# Patient Record
Sex: MALE | Race: WHITE | NOT HISPANIC OR LATINO | URBAN - METROPOLITAN AREA
[De-identification: names, ages, dates, MRNs, and addresses within clinical notes are randomized per-mention and may not be internally consistent; named-entity substitution may affect disease eponyms.]

---

## 2017-12-13 ENCOUNTER — TELEPHONE (OUTPATIENT)
Dept: VASCULAR LAB | Facility: MEDICAL CENTER | Age: 62
End: 2017-12-13

## 2017-12-13 ENCOUNTER — ANTICOAGULATION VISIT (OUTPATIENT)
Dept: MEDICAL GROUP | Facility: PHYSICIAN GROUP | Age: 62
End: 2017-12-13

## 2017-12-13 DIAGNOSIS — I63.019 CEREBROVASCULAR ACCIDENT (CVA) DUE TO THROMBOSIS OF VERTEBRAL ARTERY, UNSPECIFIED BLOOD VESSEL LATERALITY (HCC): ICD-10-CM

## 2017-12-13 PROBLEM — I63.9 STROKE (HCC): Status: ACTIVE | Noted: 2017-12-13

## 2017-12-13 LAB — INR PPP: 2.7 (ref 2–3.5)

## 2017-12-13 PROCEDURE — 85610 PROTHROMBIN TIME: CPT | Performed by: NURSE PRACTITIONER

## 2017-12-13 PROCEDURE — 99211 OFF/OP EST MAY X REQ PHY/QHP: CPT | Performed by: NURSE PRACTITIONER

## 2017-12-13 RX ORDER — OMEPRAZOLE 20 MG/1
20 CAPSULE, DELAYED RELEASE ORAL DAILY
Status: ON HOLD | COMMUNITY
End: 2023-10-30 | Stop reason: SDUPTHER

## 2017-12-13 RX ORDER — ATORVASTATIN CALCIUM 10 MG/1
10 TABLET, FILM COATED ORAL NIGHTLY
COMMUNITY
End: 2021-04-12

## 2017-12-13 RX ORDER — ENALAPRIL MALEATE 5 MG/1
5 TABLET ORAL DAILY
Status: ON HOLD | COMMUNITY
End: 2023-10-30

## 2017-12-13 RX ORDER — WARFARIN SODIUM 5 MG/1
5 TABLET ORAL DAILY
COMMUNITY
End: 2018-06-07 | Stop reason: SDUPTHER

## 2017-12-13 NOTE — TELEPHONE ENCOUNTER
[facility]    No VM available, will try contacting again at a later time.  Referral requests pt to be seen in [place] location.    Saurav Walsh, MaryD

## 2017-12-13 NOTE — PROGRESS NOTES
Anticoagulation Summary  As of 12/13/2017    INR goal:   2.0-3.0   TTR:   --   Today's INR:   2.7   Maintenance plan:   2.5 mg (5 mg x 0.5) on Mon, Wed, Fri; 5 mg (5 mg x 1) all other days   Weekly total:   27.5 mg   Plan last modified:   Bernice Nielson PharmD (12/13/2017)   Next INR check:   1/10/2018   Target end date:       Indications    Stroke (CMS-HCC) [I63.9] [I63.9]             Anticoagulation Episode Summary     INR check location:   [facility]    Preferred lab:       Send INR reminders to:       Comments:         Anticoagulation Care Providers     Provider Role Specialty Phone number    [facility] Responsible  [phone]    Marshal Sethi M.D. Responsible Neurology [phone]        Anticoagulation Patient Findings    Pt is new to warfarin and new to [facility].  Discussed indication for warfarin therapy and INR goal range. Explained our services, hours of operation, warfarin therapy, potential SE, potential DI.. Discussed diet at length, with an emphasis on foods rich in vitamin K.  Discussed monitoring parameters, such as blood in urine, blood in stool, discussed what to do if a dose is missed, or suspected as missed.  Emphasized importance of compliance.  Discussed lifestyle choices of ETOH & smoking and its impact on therapy.     Pt denies any unusual s/s of bleeding, bruising, clotting or any changes to diet or medications.    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  Pt has been anticoagulated since 2015.  He was previously managed by the [facility].    Pt dose NOT take ASA 81 mg. Medications reviewed and reconciled.    Pt has been stable on this dose. Follow up in 4 weeks, to reduce risk of adverse events related to this high risk medication,  Warfarin.    Bernice Nielson, PharmD      Pt declines vitals today

## 2017-12-14 NOTE — TELEPHONE ENCOUNTER
Initial anticoagulation clinic note and referral from neurologist office reviewed.  Pending further recommendations, we will continue with indefinite anticoagulation as directed by patient's neurologist for presumed embolic stroke.  We'll defer all other follow-up, aside from anticoagulation management, to remainder of patient's care team, including neurology    Michael J. Bloch, MD  [facility]    CC: Dr. Sethi

## 2017-12-19 ENCOUNTER — TELEPHONE (OUTPATIENT)
Dept: VASCULAR LAB | Facility: MEDICAL CENTER | Age: 62
End: 2017-12-19

## 2018-01-10 ENCOUNTER — ANTICOAGULATION VISIT (OUTPATIENT)
Dept: MEDICAL GROUP | Facility: PHYSICIAN GROUP | Age: 63
End: 2018-01-10
Payer: COMMERCIAL

## 2018-01-10 DIAGNOSIS — I63.00 CEREBROVASCULAR ACCIDENT (CVA) DUE TO THROMBOSIS OF PRECEREBRAL ARTERY (HCC): ICD-10-CM

## 2018-01-10 LAB — INR PPP: 3 (ref 2–3.5)

## 2018-01-10 PROCEDURE — 85610 PROTHROMBIN TIME: CPT | Performed by: FAMILY MEDICINE

## 2018-01-10 PROCEDURE — 99211 OFF/OP EST MAY X REQ PHY/QHP: CPT | Performed by: FAMILY MEDICINE

## 2018-01-10 NOTE — PROGRESS NOTES
Anticoagulation Summary  As of 1/10/2018    INR goal:   2.0-3.0   TTR:   100.0 % (2.6 wk)   Today's INR:   3.0   Maintenance plan:   2.5 mg (5 mg x 0.5) on Mon, Wed, Fri; 5 mg (5 mg x 1) all other days   Weekly total:   27.5 mg   Plan last modified:   Mary GoncalvesD (12/13/2017)   Next INR check:      Target end date:   Indefinite    Indications    Stroke (CMS-HCC) [I63.9] [I63.9]             Anticoagulation Episode Summary     INR check location:   [facility]    Preferred lab:       Send INR reminders to:       Comments:         Anticoagulation Care Providers     Provider Role Specialty Phone number    [facility] Responsible  [phone]    Marshal Sethi M.D. Responsible Neurology [phone]        Anticoagulation Patient Findings  Patient Findings     Negatives:   Signs/symptoms of thrombosis, Signs/symptoms of bleeding, Laboratory test error suspected, Change in health, Change in alcohol use, Change in activity, Upcoming invasive procedure, Emergency department visit, Upcoming dental procedure, Missed doses, Extra doses, Change in medications, Change in diet/appetite, Hospital admission, Bruising, Other complaints        History of Present Illness: follow up appointment for chronic anticoagulation with the high risk medication, warfarin for stroke.  Pt remains therapeutic today.  Pt has been on a prednisone taper.  Pt will continue with 1/2 dose for two more days.  Follow up in 6 weeks, to reduce risk of adverse events related to this high risk medication,  Warfarin.    Bernice Nielson, PharmD     Pt declines vitals today

## 2018-02-14 ENCOUNTER — ANTICOAGULATION VISIT (OUTPATIENT)
Dept: MEDICAL GROUP | Facility: PHYSICIAN GROUP | Age: 63
End: 2018-02-14
Payer: COMMERCIAL

## 2018-02-14 VITALS
OXYGEN SATURATION: 95 % | SYSTOLIC BLOOD PRESSURE: 110 MMHG | HEIGHT: 65 IN | DIASTOLIC BLOOD PRESSURE: 58 MMHG | HEART RATE: 66 BPM

## 2018-02-14 DIAGNOSIS — I63.00 CEREBROVASCULAR ACCIDENT (CVA) DUE TO THROMBOSIS OF PRECEREBRAL ARTERY (HCC): ICD-10-CM

## 2018-02-14 LAB — INR PPP: 4.1 (ref 2–3.5)

## 2018-02-14 PROCEDURE — 85610 PROTHROMBIN TIME: CPT | Performed by: FAMILY MEDICINE

## 2018-02-14 PROCEDURE — 99211 OFF/OP EST MAY X REQ PHY/QHP: CPT | Performed by: FAMILY MEDICINE

## 2018-02-14 NOTE — PROGRESS NOTES
Anticoagulation Summary  As of 2/14/2018    INR goal:   2.0-3.0   TTR:   34.9 % (1.8 mo)   Today's INR:   4.1!   Maintenance plan:   5 mg (5 mg x 1) on Mon, Wed, Fri; 2.5 mg (5 mg x 0.5) all other days   Weekly total:   25 mg   Plan last modified:   Bernice Nielson PharmD (2/14/2018)   Next INR check:   2/28/2018   Target end date:   Indefinite    Indications    Stroke (CMS-HCC) [I63.9] [I63.9]             Anticoagulation Episode Summary     INR check location:   [facility]    Preferred lab:       Send INR reminders to:       Comments:         Anticoagulation Care Providers     Provider Role Specialty Phone number    [facility] Responsible  [phone]    Marshal Sethi M.D. Responsible Neurology [phone]        Anticoagulation Patient Findings  Patient Findings     Negatives:   Signs/symptoms of thrombosis, Signs/symptoms of bleeding, Laboratory test error suspected, Change in health, Change in alcohol use, Change in activity, Upcoming invasive procedure, Emergency department visit, Upcoming dental procedure, Missed doses, Extra doses, Change in medications, Change in diet/appetite, Hospital admission, Bruising, Other complaints        History of Present Illness: follow up appointment for chronic anticoagulation with the high risk medication, warfarin for [facility] [place].    Last INR was at goal. Pt decreased his carbohydrate intake for his diabetes, and is likely eating less in general.  Will HOLD dose tonight, and decrease weekly dose by 10%. Follow up in 2 weeks, to reduce risk of adverse events related to this high risk medication,  Warfarin.      Bernice Nielson, MaryD

## 2018-02-28 ENCOUNTER — ANTICOAGULATION VISIT (OUTPATIENT)
Dept: MEDICAL GROUP | Facility: PHYSICIAN GROUP | Age: 63
End: 2018-02-28
Payer: COMMERCIAL

## 2018-02-28 VITALS
DIASTOLIC BLOOD PRESSURE: 52 MMHG | OXYGEN SATURATION: 97 % | SYSTOLIC BLOOD PRESSURE: 98 MMHG | HEART RATE: 65 BPM | HEIGHT: 67 IN

## 2018-02-28 DIAGNOSIS — Z86.73 HISTORY OF STROKE: ICD-10-CM

## 2018-02-28 DIAGNOSIS — I63.00 CEREBROVASCULAR ACCIDENT (CVA) DUE TO THROMBOSIS OF PRECEREBRAL ARTERY (HCC): ICD-10-CM

## 2018-02-28 LAB — INR PPP: 2.3 (ref 2–3.5)

## 2018-02-28 PROCEDURE — 99211 OFF/OP EST MAY X REQ PHY/QHP: CPT | Performed by: FAMILY MEDICINE

## 2018-02-28 PROCEDURE — 85610 PROTHROMBIN TIME: CPT | Performed by: FAMILY MEDICINE

## 2018-02-28 NOTE — PROGRESS NOTES
Anticoagulation Summary  As of 2/28/2018    INR goal:   2.0-3.0   TTR:   35.7 % (2.2 mo)   Today's INR:   2.3   Maintenance plan:   5 mg (5 mg x 1) on Mon, Wed, Fri; 2.5 mg (5 mg x 0.5) all other days   Weekly total:   25 mg   Plan last modified:   Bernice Nielson PharmD (2/14/2018)   Next INR check:   3/28/2018   Target end date:   Indefinite    Indications    Stroke (CMS-HCC) [I63.9] [I63.9]             Anticoagulation Episode Summary     INR check location:   [facility]    Preferred lab:       Send INR reminders to:       Comments:         Anticoagulation Care Providers     Provider Role Specialty Phone number    [facility] Responsible  [phone]    Marshal Sethi M.D. Responsible Neurology [phone]        Anticoagulation Patient Findings    History of Present Illness: follow up appointment for chronic anticoagulation with the high risk medication, warfarin for stroke.    Last INR was out of range, dosage adjusted: dose was reduced.  Pt is now therapeutic today. Pt is to continue with current warfarin dosing regimen.  Pt denies any unusual s/s of bleeding, bruising, clotting or any changes to diet or medications.  Follow up in 4 weeks, to reduce risk of adverse events related to this high risk medication,  Warfarin.  Bernice Nielson, MaryD

## 2018-03-28 ENCOUNTER — ANTICOAGULATION VISIT (OUTPATIENT)
Dept: MEDICAL GROUP | Facility: PHYSICIAN GROUP | Age: 63
End: 2018-03-28
Payer: COMMERCIAL

## 2018-03-28 VITALS — DIASTOLIC BLOOD PRESSURE: 58 MMHG | HEART RATE: 57 BPM | SYSTOLIC BLOOD PRESSURE: 102 MMHG | OXYGEN SATURATION: 97 %

## 2018-03-28 DIAGNOSIS — I63.00 CEREBROVASCULAR ACCIDENT (CVA) DUE TO THROMBOSIS OF PRECEREBRAL ARTERY (HCC): ICD-10-CM

## 2018-03-28 LAB — INR PPP: 2.4 (ref 2–3.5)

## 2018-03-28 PROCEDURE — 99999 PR NO CHARGE: CPT | Performed by: NURSE PRACTITIONER

## 2018-03-28 PROCEDURE — 85610 PROTHROMBIN TIME: CPT | Performed by: NURSE PRACTITIONER

## 2018-03-28 NOTE — PROGRESS NOTES
Anticoagulation Summary  As of 3/28/2018    INR goal:   2.0-3.0   TTR:   54.6 % (3.2 mo)   Today's INR:   2.4   Maintenance plan:   5 mg (5 mg x 1) on Mon, Wed, Fri; 2.5 mg (5 mg x 0.5) all other days   Weekly total:   25 mg   Plan last modified:   Bernice Nielson PharmD (2/14/2018)   Next INR check:   5/9/2018   Target end date:   Indefinite    Indications    Stroke (CMS-HCC) [I63.9] [I63.9]             Anticoagulation Episode Summary     INR check location:   [facility]    Preferred lab:       Send INR reminders to:       Comments:         Anticoagulation Care Providers     Provider Role Specialty Phone number    [facility] Responsible  [phone]    Marshal Sethi M.D. Responsible Neurology [phone]        Anticoagulation Patient Findings    History of Present Illness: follow up appointment for chronic anticoagulation with the high risk medication, warfarin for stroke.    Pt remains therapeutic today. Pt is to continue with current warfarin dosing regimen.  Will increase the interval to recheck INR    Bernice Nielson PharmD

## 2018-05-09 ENCOUNTER — ANTICOAGULATION VISIT (OUTPATIENT)
Dept: MEDICAL GROUP | Facility: PHYSICIAN GROUP | Age: 63
End: 2018-05-09
Payer: COMMERCIAL

## 2018-05-09 VITALS — DIASTOLIC BLOOD PRESSURE: 55 MMHG | HEIGHT: 67 IN | SYSTOLIC BLOOD PRESSURE: 110 MMHG | HEART RATE: 60 BPM

## 2018-05-09 DIAGNOSIS — I63.00 CEREBROVASCULAR ACCIDENT (CVA) DUE TO THROMBOSIS OF PRECEREBRAL ARTERY (HCC): ICD-10-CM

## 2018-05-09 LAB — INR PPP: 2.2 (ref 2–3.5)

## 2018-05-09 PROCEDURE — 99211 OFF/OP EST MAY X REQ PHY/QHP: CPT | Performed by: FAMILY MEDICINE

## 2018-05-09 PROCEDURE — 85610 PROTHROMBIN TIME: CPT | Performed by: FAMILY MEDICINE

## 2018-05-09 NOTE — PROGRESS NOTES
Anticoagulation Summary  As of 5/9/2018    INR goal:   2.0-3.0   TTR:   68.5 % (4.6 mo)   Today's INR:   2.2   Warfarin maintenance plan:   5 mg (5 mg x 1) on Mon, Wed, Fri; 2.5 mg (5 mg x 0.5) all other days   Weekly warfarin total:   25 mg   Plan last modified:   Bernice Nielson PharmD (2/14/2018)   Next INR check:   7/11/2018   Target end date:   Indefinite    Indications    Stroke (CMS-HCC) [I63.9] [I63.9]             Anticoagulation Episode Summary     INR check location:   [facility]    Preferred lab:       Send INR reminders to:       Comments:         Anticoagulation Care Providers     Provider Role Specialty Phone number    [facility] Responsible  [phone]    Marshal Sethi M.D. Responsible Neurology [phone]        Anticoagulation Patient Findings    History of Present Illness: follow up appointment for chronic anticoagulation with the high risk medication, warfarin for stroke.    Pt remains therapeutic today. Pt is to continue with current warfarin dosing regimen.  Follow up in 8 weeks, to reduce risk of adverse events related to this high risk medication,  Warfarin.    Bernice Nielson, MaryD

## 2018-06-07 RX ORDER — WARFARIN SODIUM 5 MG/1
TABLET ORAL
Qty: 90 TAB | Refills: 1 | Status: SHIPPED | OUTPATIENT
Start: 2018-06-07 | End: 2018-12-10 | Stop reason: SDUPTHER

## 2018-07-11 ENCOUNTER — ANTICOAGULATION VISIT (OUTPATIENT)
Dept: MEDICAL GROUP | Facility: PHYSICIAN GROUP | Age: 63
End: 2018-07-11
Payer: COMMERCIAL

## 2018-07-11 DIAGNOSIS — Z86.73 HISTORY OF STROKE: ICD-10-CM

## 2018-07-11 LAB — INR PPP: 2.6 (ref 2–3.5)

## 2018-07-11 PROCEDURE — 85610 PROTHROMBIN TIME: CPT | Performed by: FAMILY MEDICINE

## 2018-07-11 PROCEDURE — 99999 PR NO CHARGE: CPT | Performed by: FAMILY MEDICINE

## 2018-07-11 NOTE — PROGRESS NOTES
Anticoagulation Summary  As of 7/11/2018    INR goal:   2.0-3.0   TTR:   78.4 % (6.7 mo)   Today's INR:   2.6   Warfarin maintenance plan:   5 mg (5 mg x 1) on Mon, Wed, Fri; 2.5 mg (5 mg x 0.5) all other days   Weekly warfarin total:   25 mg   Plan last modified:   Bernice Nielson PharmD (2/14/2018)   Next INR check:   9/19/2018   Target end date:   Indefinite    Indications    Stroke (CMS-HCC) [I63.9] [I63.9]             Anticoagulation Episode Summary     INR check location:   [facility]    Preferred lab:       Send INR reminders to:       Comments:         Anticoagulation Care Providers     Provider Role Specialty Phone number    [facility] Responsible  [phone]    Marshal Sethi M.D. Responsible Neurology [phone]        Anticoagulation Patient Findings    History of Present Illness: follow up appointment for chronic anticoagulation with the high risk medication, warfarin for stroke.    Pt remains therapeutic today. Pt is to continue with current warfarin dosing regimen.  Pt denies any unusual s/s of bleeding, bruising, clotting or any changes to diet or medications.  Follow up in 10 weeks, to reduce risk of adverse events related to this high risk medication,  Warfarin.    Bernice Nielson PharmD      Pt declines vitals today

## 2018-08-22 LAB — INR PPP: 2 (ref 2–3.5)

## 2018-08-23 ENCOUNTER — ANTICOAGULATION MONITORING (OUTPATIENT)
Dept: VASCULAR LAB | Facility: MEDICAL CENTER | Age: 63
End: 2018-08-23

## 2018-08-23 NOTE — PROGRESS NOTES
Anticoagulation Summary  As of 8/23/2018    INR goal:   2.0-3.0   TTR:   82.1 % (8.1 mo)   Today's INR:   2.0 (8/22/2018)   Warfarin maintenance plan:   5 mg (5 mg x 1) on Mon, Wed, Fri; 2.5 mg (5 mg x 0.5) all other days   Weekly warfarin total:   25 mg   Plan last modified:   Mary GoncalvesD (2/14/2018)   Next INR check:   9/19/2018   Target end date:   Indefinite    Indications    Stroke (CMS-HCC) [I63.9] [I63.9]             Anticoagulation Episode Summary     INR check location:   [facility]    Preferred lab:       Send INR reminders to:       Comments:         Anticoagulation Care Providers     Provider Role Specialty Phone number    [facility] Responsible  [phone]    Marshal Sethi M.D. Responsible Neurology [phone]        Anticoagulation Patient Findings    Spoke with Hieu to report a theraeputic today.  Pt is to continue with current warfarin dosing regimen.  Follow up in 4 weeks, to reduce risk of adverse events related to this high risk medication,  Warfarin.    Bernice Nielson, PharmD

## 2018-09-19 ENCOUNTER — ANTICOAGULATION VISIT (OUTPATIENT)
Dept: MEDICAL GROUP | Facility: PHYSICIAN GROUP | Age: 63
End: 2018-09-19
Payer: COMMERCIAL

## 2018-09-19 DIAGNOSIS — Z23 FLU VACCINE NEED: ICD-10-CM

## 2018-09-19 DIAGNOSIS — Z79.01 CURRENT USE OF LONG TERM ANTICOAGULATION: ICD-10-CM

## 2018-09-19 LAB — INR PPP: 2.3 (ref 2–3.5)

## 2018-09-19 PROCEDURE — 90471 IMMUNIZATION ADMIN: CPT | Performed by: FAMILY MEDICINE

## 2018-09-19 PROCEDURE — 85610 PROTHROMBIN TIME: CPT | Performed by: FAMILY MEDICINE

## 2018-09-19 PROCEDURE — 90686 IIV4 VACC NO PRSV 0.5 ML IM: CPT | Performed by: FAMILY MEDICINE

## 2018-09-19 PROCEDURE — 99999 PR NO CHARGE: CPT | Performed by: FAMILY MEDICINE

## 2018-09-19 NOTE — PROGRESS NOTES
Anticoagulation Summary  As of 9/19/2018    INR goal:   2.0-3.0   TTR:   84.0 % (9 mo)   Today's INR:   2.3   Warfarin maintenance plan:   5 mg (5 mg x 1) on Mon, Wed, Fri; 2.5 mg (5 mg x 0.5) all other days   Weekly warfarin total:   25 mg   Plan last modified:   Bernice Nielson, PharmD (2/14/2018)   Next INR check:   10/31/2018   Target end date:   Indefinite    Indications    Stroke (CMS-HCC) [I63.9] [I63.9]             Anticoagulation Episode Summary     INR check location:   [facility]    Preferred lab:       Send INR reminders to:       Comments:         Anticoagulation Care Providers     Provider Role Specialty Phone number    [facility] Responsible  [phone]    Marshal Sethi M.D. Responsible Neurology [phone]        Anticoagulation Patient Findings       History of Present Illness: follow up appointment for chronic anticoagulation with the high risk medication, warfarin for stroke    Pt remains therapeutic today. Continue current dosing regimen.  Follow up in 6 weeks, to reduce the risk of adverse events related to this high risk medication, warfarin.    Pt declines vitals today, however requested, consented to and received flu vaccine.  Bernice Nielson, Clinical Pharmacist

## 2018-10-27 LAB — INR PPP: 2.8 (ref 2–3.5)

## 2018-10-29 ENCOUNTER — TELEPHONE (OUTPATIENT)
Dept: VASCULAR LAB | Facility: MEDICAL CENTER | Age: 63
End: 2018-10-29

## 2018-10-29 NOTE — TELEPHONE ENCOUNTER
Pt called to report he is unable to go to appt 10[place]31-18 [place] He went to  10-, will call pt with results when we receive.     Nathalia Carroll, Pharm D

## 2018-10-31 ENCOUNTER — ANTICOAGULATION MONITORING (OUTPATIENT)
Dept: VASCULAR LAB | Facility: MEDICAL CENTER | Age: 63
End: 2018-10-31

## 2018-10-31 NOTE — PROGRESS NOTES
Anticoagulation Summary  As of 10/31/2018    INR goal:   2.0-3.0   TTR:   85.9 % (10.3 mo)   Today's INR:   2.8 (10/27/2018)   Warfarin maintenance plan:   5 mg (5 mg x 1) on Mon, Wed, Fri; 2.5 mg (5 mg x 0.5) all other days   Weekly warfarin total:   25 mg   Plan last modified:   Mary GoncalvesD (2/14/2018)   Next INR check:   12/26/2018   Target end date:   Indefinite    Indications    Stroke (CMS-HCC) [I63.9] [I63.9]             Anticoagulation Episode Summary     INR check location:   [facility]    Preferred lab:       Send INR reminders to:       Comments:         Anticoagulation Care Providers     Provider Role Specialty Phone number    [facility] Responsible  [phone]    Marshal Sethi M.D. Responsible Neurology [phone]        Anticoagulation Patient Findings    Left voicemail message to report a therapeutic INR of 2.8.  Pt to continue with current warfarin dosing regimen. Requested pt contact the clinic for any s/s of unusual bleeding, bruising, clotting or any changes to diet or medication. FU 8 weeks.  Dalton Taveras, PharmD

## 2018-11-28 ENCOUNTER — ANTICOAGULATION VISIT (OUTPATIENT)
Dept: MEDICAL GROUP | Facility: PHYSICIAN GROUP | Age: 63
End: 2018-11-28
Payer: COMMERCIAL

## 2018-11-28 DIAGNOSIS — Z86.73 HISTORY OF STROKE: ICD-10-CM

## 2018-11-28 LAB — INR PPP: 2.1 (ref 2–3.5)

## 2018-11-28 PROCEDURE — 85610 PROTHROMBIN TIME: CPT | Performed by: FAMILY MEDICINE

## 2018-11-28 PROCEDURE — 99999 PR NO CHARGE: CPT | Performed by: FAMILY MEDICINE

## 2018-11-28 NOTE — PROGRESS NOTES
Anticoagulation Summary  As of 11/28/2018    INR goal:   2.0-3.0   TTR:   87.3 % (11.3 mo)   Today's INR:   2.1   Warfarin maintenance plan:   5 mg (5 mg x 1) on Mon, Wed, Fri; 2.5 mg (5 mg x 0.5) all other days   Weekly warfarin total:   25 mg   Plan last modified:   Bernice Nielson, PharmD (2/14/2018)   Next INR check:   1/9/2019   Target end date:   Indefinite    Indications    Stroke (CMS-HCC) [I63.9] [I63.9]             Anticoagulation Episode Summary     INR check location:   [facility]    Preferred lab:       Send INR reminders to:       Comments:         Anticoagulation Care Providers     Provider Role Specialty Phone number    [facility] Responsible  [phone]    Marshal Sethi M.D. Responsible Neurology [phone]        Anticoagulation Patient Findings    History of Present Illness: follow up appointment for chronic anticoagulation with the high risk medication, warfarin for stroke    Pt remains therapeutic today. Continue current dosing regimen.  Follow up in 7 weeks, to reduce the risk of adverse events related to this high risk medication, warfarin.    Bernice Nielson Clinical Pharmacist    Pt declines vitals today

## 2018-12-10 RX ORDER — WARFARIN SODIUM 5 MG/1
TABLET ORAL
Qty: 90 TAB | Refills: 1 | Status: SHIPPED | OUTPATIENT
Start: 2018-12-10 | End: 2019-06-06 | Stop reason: SDUPTHER

## 2019-02-06 ENCOUNTER — ANTICOAGULATION VISIT (OUTPATIENT)
Dept: MEDICAL GROUP | Facility: PHYSICIAN GROUP | Age: 64
End: 2019-02-06
Payer: COMMERCIAL

## 2019-02-06 DIAGNOSIS — Z79.01 CURRENT USE OF LONG TERM ANTICOAGULATION: ICD-10-CM

## 2019-02-06 LAB — INR PPP: 2.8 (ref 2–3.5)

## 2019-02-06 PROCEDURE — 85610 PROTHROMBIN TIME: CPT | Performed by: FAMILY MEDICINE

## 2019-02-06 PROCEDURE — 93793 ANTICOAG MGMT PT WARFARIN: CPT | Performed by: FAMILY MEDICINE

## 2019-02-06 NOTE — PROGRESS NOTES
Anticoagulation Summary  As of 2019    INR goal:   2.0-3.0   TTR:   89.4 % (1.1 y)   INR used for dosin.8 (2019)   Warfarin maintenance plan:   5 mg (5 mg x 1) every Mon, Wed, Fri; 2.5 mg (5 mg x 0.5) all other days   Weekly warfarin total:   25 mg   Plan last modified:   Bernice Nielson, PharmD (2018)   Next INR check:   2019   Target end date:   Indefinite    Indications    Stroke (CMS-HCC) [I63.9] [I63.9]             Anticoagulation Episode Summary     INR check location:   [facility]    Preferred lab:       Send INR reminders to:       Comments:         Anticoagulation Care Providers     Provider Role Specialty Phone number    [facility] Responsible  [phone]    Marshal Sethi M.D. Responsible Neurology [phone]        Anticoagulation Patient Findings    History of Present Illness: follow up appointment for chronic anticoagulation with the high risk medication, warfarin for stroke    Pt remains therapeutic today. Continue current dosing regimen.  Follow up in 10 weeks, to reduce the risk of adverse events related to this high risk medication, warfarin.    Bernice Nielson Clinical Pharmacist      Pt declines vitals today

## 2019-03-20 ENCOUNTER — ANTICOAGULATION VISIT (OUTPATIENT)
Dept: MEDICAL GROUP | Facility: PHYSICIAN GROUP | Age: 64
End: 2019-03-20
Payer: COMMERCIAL

## 2019-03-20 DIAGNOSIS — Z79.01 CURRENT USE OF LONG TERM ANTICOAGULATION: ICD-10-CM

## 2019-03-20 LAB — INR PPP: 2.7 (ref 2–3.5)

## 2019-03-20 PROCEDURE — 85610 PROTHROMBIN TIME: CPT | Performed by: FAMILY MEDICINE

## 2019-03-20 PROCEDURE — 99211 OFF/OP EST MAY X REQ PHY/QHP: CPT | Performed by: FAMILY MEDICINE

## 2019-03-20 NOTE — PROGRESS NOTES
Anticoagulation Summary  As of 3/20/2019    INR goal:   2.0-3.0   TTR:   90.4 % (1.2 y)   INR used for dosin.7 (3/20/2019)   Warfarin maintenance plan:   5 mg (5 mg x 1) every Mon, Wed, Fri; 2.5 mg (5 mg x 0.5) all other days   Weekly warfarin total:   25 mg   Plan last modified:   Bernice Nielson, PharmD (2018)   Next INR check:      Target end date:   Indefinite    Indications    Stroke (CMS-HCC) [I63.9] [I63.9]             Anticoagulation Episode Summary     [facility]R check location:   [facility]    Preferred lab:       Send INR reminders to:       Comments:         Anticoagulation Care Providers     Provider Role Specialty Phone number    [facility] Responsible  [phone]    Marshal Sethi Responsible Neurology [phone]        Anticoagulation Patient Findings    History of Present Illness: follow up appointment for chronic anticoagulation with the high risk medication, warfarin for stroke    Pt remains therapeutic today. Pt is pending dental procedure that will require lovenox bridging, however it is not yet scheduled.       - Last dose of warfarin   - No warfarin, No enoxaparin   - No warfarin, begin enoxaparin 100 mg daily   - No warfarin, continue enoxaparin 100mg daily   - No warfarin, continue enoxaparin 100mg sub q daily   - No warfarin, Stop enoxaparin  - Procedure day - May resume warfarin AFTER procedure 5mg    -  Warfarin 5mg  and enoxaparin 100mg sub q daily   Continue warfarin 5mg  and enoxaparin 100mg sub q daily   Continue warfarin 5 mgand enoxaparin 100mg sub q daily   Continue warfarin 5 mg  and enoxaparin 100mg sub q daily    April 3 TEST INR     Bernice Nielson Clinical Pharmacist    Dosing instructions e-mailed to patient

## 2019-03-20 NOTE — PATIENT INSTRUCTIONS
Call Bernice [phone] when you have your dental appointment scheduled.    Days prior to procedure:  5 - No warfarin, No enoxaparin  4 - No warfarin, begin enoxaparin 100 mg daily  3 - No warfarin, continue enoxaparin 100mg daily  2 - No warfarin, continue enoxaparin 100mg sub q daily  1 - No warfarin, Stop enoxaparin    Day of procedure:   Operating physician to determine start date for enoxaparin and warfarin    Days after procedure:  1 - Begin warfarin and enoxaparin 100mg sub q daily  2 - Continue warfarin and enoxaparin 100mg sub q daily  3 - Continue warfarin and enoxaparin 100mg sub q daily  4 - Continue warfarin and enoxaparin 100mg sub q daily    5 - Continue warfarin and enoxaparin and have INR checked

## 2019-04-03 ENCOUNTER — ANTICOAGULATION VISIT (OUTPATIENT)
Dept: MEDICAL GROUP | Facility: PHYSICIAN GROUP | Age: 64
End: 2019-04-03
Payer: COMMERCIAL

## 2019-04-03 DIAGNOSIS — Z79.01 CHRONIC ANTICOAGULATION: ICD-10-CM

## 2019-04-03 LAB — INR PPP: 2.2 (ref 2–3.5)

## 2019-04-03 PROCEDURE — 85610 PROTHROMBIN TIME: CPT | Performed by: FAMILY MEDICINE

## 2019-04-03 PROCEDURE — 93793 ANTICOAG MGMT PT WARFARIN: CPT | Performed by: FAMILY MEDICINE

## 2019-04-03 NOTE — PROGRESS NOTES
Anticoagulation Summary  As of 4/3/2019    INR goal:   2.0-3.0   TTR:   90.7 % (1.3 y)   INR used for dosin.2 (4/3/2019)   Warfarin maintenance plan:   5 mg (5 mg x 1) every Mon, Wed, Fri; 2.5 mg (5 mg x 0.5) all other days   Weekly warfarin total:   25 mg   Plan last modified:   Bernice Nielson, PharmD (2018)   Next INR check:      Target end date:   Indefinite    Indications    Stroke (CMS-HCC) [I63.9] [I63.9]             Anticoagulation Episode Summary     [facility]R check location:   [facility]    Preferred lab:       Send INR reminders to:       Comments:         Anticoagulation Care Providers     Provider Role Specialty Phone number    [facility] Responsible  [phone]    Marshal Sethi Responsible Neurology [phone]        Anticoagulation Patient Findings    History of Present Illness: follow up appointment for chronic anticoagulation with the high risk medication, warfarin for stroke    Last INR was out of range, dosage adjusted: pt remains therapeutic, however, he was bridging for a  Dental procedure.  Pt is therapeutic today, so he can STOP lovenox injections. Continue current dosing regimen.  Follow up in 6 weeks, to reduce the risk of adverse events related to this high risk medication, warfarin.    Bernice Nielson, Clinical Pharmacist

## 2019-05-16 ENCOUNTER — TELEPHONE (OUTPATIENT)
Dept: VASCULAR LAB | Facility: MEDICAL CENTER | Age: 64
End: 2019-05-16

## 2019-05-16 NOTE — TELEPHONE ENCOUNTER
Pt no showed his anticoagulation services yesterday.  Will try to reschedule  Bernice Nielson, Clinical Pharmacist

## 2019-05-22 ENCOUNTER — ANTICOAGULATION VISIT (OUTPATIENT)
Dept: MEDICAL GROUP | Facility: PHYSICIAN GROUP | Age: 64
End: 2019-05-22
Payer: COMMERCIAL

## 2019-05-22 DIAGNOSIS — Z79.01 CHRONIC ANTICOAGULATION: ICD-10-CM

## 2019-05-22 LAB — INR PPP: 3.8 (ref 2–3.5)

## 2019-05-22 PROCEDURE — 85610 PROTHROMBIN TIME: CPT | Performed by: FAMILY MEDICINE

## 2019-05-22 PROCEDURE — 99211 OFF/OP EST MAY X REQ PHY/QHP: CPT | Performed by: FAMILY MEDICINE

## 2019-05-22 NOTE — PROGRESS NOTES
Anticoagulation Summary  As of 5/22/2019    INR goal:   2.0-3.0   TTR:   86.9 % (1.4 y)   INR used for dosing:   3.80! (5/22/2019)   Warfarin maintenance plan:   5 mg (5 mg x 1) every Mon, Wed, Fri; 2.5 mg (5 mg x 0.5) all other days   Weekly warfarin total:   25 mg   Plan last modified:   Bernice Nielson, PharmD (2/14/2018)   Next INR check:   6/5/2019   Target end date:   Indefinite    Indications    Stroke (CMS-HCC) [I63.9] [I63.9]             Anticoagulation Episode Summary     INR check location:   [facility]    Preferred lab:       Send INR reminders to:       Comments:         Anticoagulation Care Providers     Provider Role Specialty Phone number    [facility] Responsible  [phone]    Marshal Sethi Responsible Neurology [phone]        Anticoagulation Patient Findings          History of Present Illness: follow up appointment for chronic anticoagulation with the high risk medication, warfarin for stroke    Last INR was at goal, pt is now supra therapeutic today.  Pt has missed a couple of doses, then doubled up the next evening.  Discussed calling the clinic in the future if this happens again.  Discussed transitioning to Eliquis in the future, however stroke is not an approved indication for this medication.  Pt agrees to continue warfarin.  Pt will continue to follow up with Dr. Sethi, Neurologist.    Pt to HOLD warfarin tonight, then resume current dosing regimen. Follow up in 2 weeks, to reduce the risk of adverse events related to this high risk medication, warfarin.    Bernice Nielson, Clinical Pharmacist  Pt declines vitals today

## 2019-06-05 ENCOUNTER — ANTICOAGULATION VISIT (OUTPATIENT)
Dept: MEDICAL GROUP | Facility: PHYSICIAN GROUP | Age: 64
End: 2019-06-05
Payer: COMMERCIAL

## 2019-06-05 DIAGNOSIS — Z79.01 CHRONIC ANTICOAGULATION: ICD-10-CM

## 2019-06-05 LAB — INR PPP: 3.2 (ref 2–3.5)

## 2019-06-05 PROCEDURE — 85610 PROTHROMBIN TIME: CPT | Performed by: FAMILY MEDICINE

## 2019-06-05 PROCEDURE — 99211 OFF/OP EST MAY X REQ PHY/QHP: CPT | Performed by: FAMILY MEDICINE

## 2019-06-05 NOTE — PROGRESS NOTES
Anticoagulation Summary  As of 6/5/2019    INR goal:   2.0-3.0   TTR:   84.6 % (1.4 y)   INR used for dosing:   3.20! (6/5/2019)   Warfarin maintenance plan:   5 mg (5 mg x 1) every Mon, Fri; 2.5 mg (5 mg x 0.5) all other days   Weekly warfarin total:   22.5 mg   Plan last modified:   Bernice Nielson (6/5/2019)   Next INR check:   6/19/2019   Target end date:   Indefinite    Indications    Stroke (CMS-HCC) [I63.9] [I63.9]             Anticoagulation Episode Summary     [facility]R check location[facility]   [facility]    Preferred lab:       Send INR reminders to:       Comments:         Anticoagulation Care Providers     Provider Role Specialty Phone number    [facility] Responsible  [phone]    Marshal Sethi Responsible Neurology [phone]        Anticoagulation Patient Findings          History of Present Illness: follow up appointment for chronic anticoagulation with the high risk medication, warfarin for stroke    Last INR was out of range, dosage adjusted: pt remains supra therapeutic today trending downward.  Will reduce weekly dose by 10%.   Follow up in 2 weeks, to reduce the risk of adverse events related to this high risk medication, warfarin.    Bernice Nielson, Clinical Pharmacist    Pt wishes to establish with Dr. Molina.

## 2019-06-19 ENCOUNTER — ANTICOAGULATION VISIT (OUTPATIENT)
Dept: MEDICAL GROUP | Facility: PHYSICIAN GROUP | Age: 64
End: 2019-06-19
Payer: COMMERCIAL

## 2019-06-19 ENCOUNTER — OFFICE VISIT (OUTPATIENT)
Dept: MEDICAL GROUP | Facility: PHYSICIAN GROUP | Age: 64
End: 2019-06-19
Payer: COMMERCIAL

## 2019-06-19 VITALS
OXYGEN SATURATION: 98 % | HEIGHT: 68 IN | HEART RATE: 53 BPM | SYSTOLIC BLOOD PRESSURE: 102 MMHG | RESPIRATION RATE: 12 BRPM | TEMPERATURE: 97.7 F | DIASTOLIC BLOOD PRESSURE: 60 MMHG | WEIGHT: 155 LBS | BODY MASS INDEX: 23.49 KG/M2

## 2019-06-19 DIAGNOSIS — G47.09 OTHER INSOMNIA: ICD-10-CM

## 2019-06-19 DIAGNOSIS — Z87.19 HISTORY OF GI BLEED: ICD-10-CM

## 2019-06-19 DIAGNOSIS — Z79.01 CHRONIC ANTICOAGULATION: ICD-10-CM

## 2019-06-19 DIAGNOSIS — H54.7 VISION LOSS: ICD-10-CM

## 2019-06-19 DIAGNOSIS — Z86.73 HISTORY OF CVA (CEREBROVASCULAR ACCIDENT): ICD-10-CM

## 2019-06-19 PROBLEM — I63.9 STROKE (HCC): Status: RESOLVED | Noted: 2017-12-13 | Resolved: 2019-06-19

## 2019-06-19 LAB — INR PPP: 2.5 (ref 2–3.5)

## 2019-06-19 PROCEDURE — 85610 PROTHROMBIN TIME: CPT | Performed by: FAMILY MEDICINE

## 2019-06-19 PROCEDURE — 99397 PER PM REEVAL EST PAT 65+ YR: CPT | Performed by: FAMILY MEDICINE

## 2019-06-19 RX ORDER — INSULIN DEGLUDEC 100 U/ML
14 INJECTION, SOLUTION SUBCUTANEOUS DAILY
COMMUNITY

## 2019-06-19 ASSESSMENT — ENCOUNTER SYMPTOMS
GASTROINTESTINAL NEGATIVE: 1
PSYCHIATRIC NEGATIVE: 1
MYALGIAS: 0
HEARTBURN: 0
HEADACHES: 0
PALPITATIONS: 0
COUGH: 0
MUSCULOSKELETAL NEGATIVE: 1
CARDIOVASCULAR NEGATIVE: 1
FEVER: 0
TINGLING: 0
BLURRED VISION: 0
DIZZINESS: 0
EYES NEGATIVE: 1
NEUROLOGICAL NEGATIVE: 1
NAUSEA: 0
CHILLS: 0
BRUISES/BLEEDS EASILY: 0
HEMOPTYSIS: 0
DOUBLE VISION: 0
DEPRESSION: 0
CONSTITUTIONAL NEGATIVE: 1
RESPIRATORY NEGATIVE: 1

## 2019-06-19 ASSESSMENT — PATIENT HEALTH QUESTIONNAIRE - PHQ9: CLINICAL INTERPRETATION OF PHQ2 SCORE: 0

## 2019-06-19 NOTE — PROGRESS NOTES
Subjective:      Hieu Duong is a 64 y.o. male who presents with Annual Exam            New patient visit, seen by pharm services for IDDM and coumadin for history of cva and resultant visual field defect. No new issues   utd on HM except for shingles shot and placed on list    1. Vision loss  stable    2. IDDM (insulin dependent diabetes mellitus) (HCC)    Currently treated for DM, taking meds and checking bs at home, trying to do DM diet.controlled      3. History of CVA (cerebrovascular accident)  On statin and coumadin    4. History of GI bleed  No new issues for many years    Past Medical History:  No date: Diabetes (HCC)  No date: GI bleed      Comment:  after taking blood thinner  No date: Hyperlipidemia  No date: Hypertension  No date: Stroke (HCC)  No date: Vision loss      Comment:  peripheral right eye due to cva  No past surgical history on file.  Smoking status: Never Smoker                                                              Smokeless tobacco: Never Used                      Alcohol use: No              Review of patient's family history indicates:  Problem: Diabetes      Relation: Brother       Age of Onset: (Not Specified)       Current Outpatient Prescriptions: •  Insulin Degludec (TRESIBA) 100 UNIT/ML Solution, Inject 24 Units as instructed., Disp: , Rfl: •  warfarin (COUMADIN) 5 MG Tab, TAKE ONE-HALF TO ONE TABLET DAILY AS DIRECTED BY [facility], Disp: 90 Tab, Rfl: 1•  omeprazole (PRILOSEC) 20 MG delayed-release capsule, Take 20 mg by mouth every day., Disp: , Rfl: •  atorvastatin (LIPITOR) 10 MG Tab, Take 10 mg by mouth every evening., Disp: , Rfl: •  insulin lispro (HUMALOG) 100 UNIT/ML Solution, Inject  as instructed 3 times a day before meals., Disp: , Rfl: •  enalapril (VASOTEC) 5 MG Tab, Take 5 mg by mouth every day., Disp: , Rfl: •  Insulin Glargine (TOUJEO SOLOSTAR) 300 UNIT/ML Solution Pen-injector, Inject  as instructed., Disp: , Rfl:     Patient  "was instructed on the use of medications, either prescriptions or OTC and informed on when the appropriate follow up time period should be. In addition, patient was also instructed that should any acute worsening occur that they should notify this clinic asap or call 911.            Review of Systems   Constitutional: Negative.  Negative for chills and fever.   HENT: Negative.  Negative for hearing loss.    Eyes: Negative.  Negative for blurred vision and double vision.   Respiratory: Negative.  Negative for cough and hemoptysis.    Cardiovascular: Negative.  Negative for chest pain and palpitations.   Gastrointestinal: Negative.  Negative for heartburn and nausea.   Genitourinary: Negative.  Negative for dysuria.   Musculoskeletal: Negative.  Negative for myalgias.   Skin: Negative.  Negative for rash.   Neurological: Negative.  Negative for dizziness, tingling and headaches.   Endo/Heme/Allergies: Negative.  Does not bruise/bleed easily.   Psychiatric/Behavioral: Negative.  Negative for depression and suicidal ideas.   All other systems reviewed and are negative.         Objective:     /60   Pulse (!) 53   Temp 36.5 °C (97.7 °F)   Resp 12   Ht 1.715 m (5' 7.5\")   Wt 70.3 kg (155 lb)   SpO2 98%   BMI 23.92 kg/m²      Physical Exam   Constitutional: He is oriented to person, place, and time. He appears well-developed and well-nourished. No distress.   HENT:   Head: Normocephalic and atraumatic.   Mouth/Throat: Oropharynx is clear and moist. No oropharyngeal exudate.   Eyes: Pupils are equal, round, and reactive to light.   Cardiovascular: Normal rate, regular rhythm, normal heart sounds and intact distal pulses.  Exam reveals no gallop and no friction rub.    No murmur heard.  Pulmonary/Chest: Effort normal and breath sounds normal. No respiratory distress. He has no wheezes. He has no rales. He exhibits no tenderness.   Neurological: He is alert and oriented to person, place, and time.   Skin: He is " not diaphoretic.   Psychiatric: He has a normal mood and affect. His behavior is normal. Judgment and thought content normal.   Nursing note and vitals reviewed.              Assessment/Plan:     1. Vision loss      2. IDDM (insulin dependent diabetes mellitus) (HCC)      3. History of CVA (cerebrovascular accident)      4. History of GI bleed

## 2019-06-19 NOTE — PROGRESS NOTES
Anticoagulation Summary  As of 2019    INR goal:   2.0-3.0   TTR:   84.2 % (1.5 y)   INR used for dosin.50 (2019)   Warfarin maintenance plan:   5 mg (5 mg x 1) every Mon, Fri; 2.5 mg (5 mg x 0.5) all other days   Weekly warfarin total:   22.5 mg   Plan last modified:   Bernice Nielson (2019)   Next INR check:   2019   Target end date:   Indefinite    Indications    Stroke (CMS-HCC) [I63.9] (Resolved) [I63.9]             Anticoagulation Episode Summary     [facility] check location:   [facility]    Preferred lab:       Send INR reminders to:       Comments:         Anticoagulation Care Providers     Provider Role Specialty Phone number    [facility] Responsible  [phone]    Marshal Sethi Responsible Neurology [phone]        Anticoagulation Patient Findings  Patient Findings     Negatives:   Signs/symptoms of thrombosis, Signs/symptoms of bleeding, Laboratory test error suspected, Change in health, Change in alcohol use, Change in activity, Upcoming invasive procedure, Emergency department visit, Upcoming dental procedure, Missed doses, Extra doses, Change in medications, Change in diet/appetite, Hospital admission, Bruising, Other complaints              History of Present Illness: follow up appointment for chronic anticoagulation with the high risk medication, warfarin for atrial fibrillation.     Last INR was out of range, dosage adjusted: pt is now at goal. Continue current dosing regimen.  Follow up in 4 weeks, to reduce the risk of adverse events related to this high risk medication, warfarin.    Bernice Nielson, Clinical Pharmacist

## 2019-07-17 ENCOUNTER — ANTICOAGULATION VISIT (OUTPATIENT)
Dept: MEDICAL GROUP | Facility: PHYSICIAN GROUP | Age: 64
End: 2019-07-17
Payer: COMMERCIAL

## 2019-07-17 DIAGNOSIS — Z79.01 CHRONIC ANTICOAGULATION: ICD-10-CM

## 2019-07-17 LAB — INR PPP: 2.6 (ref 2–3.5)

## 2019-07-17 PROCEDURE — 93793 ANTICOAG MGMT PT WARFARIN: CPT | Performed by: FAMILY MEDICINE

## 2019-07-17 PROCEDURE — 85610 PROTHROMBIN TIME: CPT | Performed by: FAMILY MEDICINE

## 2019-07-17 NOTE — PROGRESS NOTES
Anticoagulation Summary  As of 2019    INR goal:   2.0-3.0   TTR:   85.0 % (1.6 y)   INR used for dosin.60 (2019)   Warfarin maintenance plan:   5 mg (5 mg x 1) every Mon, Fri; 2.5 mg (5 mg x 0.5) all other days   Weekly warfarin total:   22.5 mg   Plan last modified:   Bernice Nielson (2019)   Next INR check:   2019   Target end date:   Indefinite    Indications    Stroke (CMS-HCC) [I63.9] (Resolved) [I63.9]             Anticoagulation Episode Summary     INR check location:   [facility]    Preferred lab:       Send INR reminders to:       Comments:         Anticoagulation Care Providers     Provider Role Specialty Phone number    [facility] Responsible  [phone]    Marshal Sethi Responsible Neurology [phone]        Anticoagulation Patient Findings          History of Present Illness: follow up appointment for chronic anticoagulation with the high risk medication, warfarin for stroke    Pt remains therapeutic today. Continue current dosing regimen.  Follow up in 6 weeks, to reduce the risk of adverse events related to this high risk medication, warfarin.    Bernice Nielson Clinical Pharmacist      Pt declines vitals today

## 2019-08-28 ENCOUNTER — ANTICOAGULATION VISIT (OUTPATIENT)
Dept: MEDICAL GROUP | Facility: PHYSICIAN GROUP | Age: 64
End: 2019-08-28
Payer: COMMERCIAL

## 2019-08-28 DIAGNOSIS — Z79.01 CHRONIC ANTICOAGULATION: ICD-10-CM

## 2019-08-28 LAB — INR PPP: 1.9 (ref 2–3.5)

## 2019-08-28 PROCEDURE — 85610 PROTHROMBIN TIME: CPT | Performed by: FAMILY MEDICINE

## 2019-08-28 PROCEDURE — 99211 OFF/OP EST MAY X REQ PHY/QHP: CPT | Performed by: FAMILY MEDICINE

## 2019-08-28 NOTE — PROGRESS NOTES
Anticoagulation Summary  As of 2019    INR goal:   2.0-3.0   TTR:   85.1 % (1.7 y)   INR used for dosin.90! (2019)   Warfarin maintenance plan:   5 mg (5 mg x 1) every Mon, Fri; 2.5 mg (5 mg x 0.5) all other days   Weekly warfarin total:   22.5 mg   Plan last modified:   Bernice Nielson (2019)   Next INR check:   2019   Target end date:   Indefinite    Indications    Stroke (CMS-HCC) [I63.9] (Resolved) [I63.9]             Anticoagulation Episode Summary     INR check location:   [facility]    Preferred lab:       Send INR reminders to:       Comments:         Anticoagulation Care Providers     Provider Role Specialty Phone number    [facility] Responsible  [phone]    Marshal Sethi M.D. Responsible Neurology [phone]        Anticoagulation Patient Findings        History of Present Illness: follow up appointment for chronic anticoagulation with the high risk medication, warfarin for stroke    Last INR was at goal, pt is now sub therapeutic today.  He may have missed a dose this past week. Will bolus dose with 5mg tonight, then resume current dosing regimen. Follow up in 2 weeks, to reduce the risk of adverse events related to this high risk medication, warfarin.    Bernice Nielson, Clinical Pharmacist      Pt declines vitals today

## 2019-09-11 ENCOUNTER — ANTICOAGULATION VISIT (OUTPATIENT)
Dept: MEDICAL GROUP | Facility: PHYSICIAN GROUP | Age: 64
End: 2019-09-11
Payer: COMMERCIAL

## 2019-09-11 VITALS — DIASTOLIC BLOOD PRESSURE: 54 MMHG | OXYGEN SATURATION: 86 % | SYSTOLIC BLOOD PRESSURE: 108 MMHG | HEART RATE: 60 BPM

## 2019-09-11 DIAGNOSIS — Z79.01 CHRONIC ANTICOAGULATION: ICD-10-CM

## 2019-09-11 LAB — INR PPP: 2.4 (ref 2–3.5)

## 2019-09-11 PROCEDURE — 93793 ANTICOAG MGMT PT WARFARIN: CPT | Performed by: FAMILY MEDICINE

## 2019-09-11 PROCEDURE — 85610 PROTHROMBIN TIME: CPT | Performed by: FAMILY MEDICINE

## 2019-09-11 NOTE — PROGRESS NOTES
Anticoagulation Summary  As of 9/11/2019    INR goal:   2.0-3.0   TTR:   85.1 % (1.7 y)   INR used for dosing:      Warfarin maintenance plan:   5 mg (5 mg x 1) every Mon, Fri; 2.5 mg (5 mg x 0.5) all other days   Weekly warfarin total:   22.5 mg   Plan last modified:   Bernice Nielson (6/5/2019)   Next INR check:      Target end date:   Indefinite    Indications    Stroke (CMS-HCC) [I63.9] (Resolved) [I63.9]             Anticoagulation Episode Summary     INR check location:   [facility]    Preferred lab:       Send INR reminders to:       Comments:         Anticoagulation Care Providers     Provider Role Specialty Phone number    [facility] Responsible  [phone]    Marshal Sethi M.D. Responsible Neurology [phone]        Anticoagulation Patient Findings  Patient Findings     Negatives:   Signs/symptoms of thrombosis, Signs/symptoms of bleeding, Laboratory test error suspected, Change in health, Change in alcohol use, Change in activity, Upcoming invasive procedure, Emergency department visit, Upcoming dental procedure, Missed doses, Extra doses, Change in medications, Change in diet/appetite, Hospital admission, Bruising, Other complaints        History of Present Illness: follow up appointment for chronic anticoagulation with the high risk medication, warfarin for stroke.    Last INR was out of range, dosage adjusted: Pt is therapeutic.    Continue current dosing regimen.    Follow up in 2 weeks, to reduce the risk of adverse events related to this high risk medication, warfarin.        I have reviewed and concur with the above plan on 09/11/2019.  Peter Jara, Pharmacy Intern  Bernice Nielson, Clinical Pharmacist

## 2019-09-25 ENCOUNTER — ANTICOAGULATION VISIT (OUTPATIENT)
Dept: MEDICAL GROUP | Facility: PHYSICIAN GROUP | Age: 64
End: 2019-09-25
Payer: COMMERCIAL

## 2019-09-25 DIAGNOSIS — Z79.01 CHRONIC ANTICOAGULATION: ICD-10-CM

## 2019-09-25 LAB — INR PPP: 1.9 (ref 2–3.5)

## 2019-09-25 PROCEDURE — 99211 OFF/OP EST MAY X REQ PHY/QHP: CPT | Performed by: FAMILY MEDICINE

## 2019-09-25 PROCEDURE — 85610 PROTHROMBIN TIME: CPT | Performed by: FAMILY MEDICINE

## 2019-09-25 NOTE — PATIENT INSTRUCTIONS
October 5 - Last dose of warfarin  October 6- No warfarin,  Lovenox 100mg sub q at dinner time  October 7- No warfarin,  Lovenox 100mg sub q at dinner time  October 8- No warfarin,  Lovenox 100mg sub q at dinner time  October 9- No warfarin,  Lovenox 100mg sub q at dinner time  October 10 NO BLOOD THINNER  October 11- Procedure day, may start warfarin AFTER procedure  October 12- Warfarin 5mg AND Lovonox 100mg sub q at dinner time  October 13 - Warfarin 5mg AND Lovonox 100mg sub q at dinner time  October 14 - Warfarin 5mg AND Lovonox 100mg sub q at dinner time  October 15 - Warfarin 5mg AND Lovonox 100mg sub q at dinner time  October 16 TEST INR

## 2019-09-25 NOTE — PROGRESS NOTES
Anticoagulation Summary  As of 2019    INR goal:   2.0-3.0   TTR:   84.8 % (1.8 y)   INR used for dosin.90! (2019)   Warfarin maintenance plan:   5 mg (5 mg x 1) every Mon, Fri; 2.5 mg (5 mg x 0.5) all other days   Weekly warfarin total:   22.5 mg   Plan last modified:   Bernice M Filter (2019)   Next INR check:      Target end date:   Indefinite    Indications    Stroke (CMS-HCC) [I63.9] (Resolved) [I63.9]             Anticoagulation Episode Summary     INR check location:   [facility]    Preferred lab:       Send INR reminders to:       Comments:         Anticoagulation Care Providers     Provider Role Specialty Phone number    [facility] Responsible  [phone]    Marshal Sethi M.D. Responsible Neurology [phone]        Anticoagulation Patient Findings          History of Present Illness: follow up appointment for chronic anticoagulation with the high risk medication, warfarin for stroke    Last INR was at goal, pt is now sub therapeutic today.  Pt will be having a dental procedure (post placement) that will require lovenox bridging.     - Last dose of warfarin  - No warfarin,  Lovenox 100mg sub q at dinner time  - No warfarin,  Lovenox 100mg sub q at dinner time  - No warfarin,  Lovenox 100mg sub q at dinner time  - No warfarin,  Lovenox 100mg sub q at dinner time  October 10 NO BLOOD THINNER  - Procedure day, may start warfarin AFTER procedure  - Warfarin 5mg AND Lovonox 100mg sub q at dinner time   - Warfarin 5mg AND Lovonox 100mg sub q at dinner time   - Warfarin 5mg AND Lovonox 100mg sub q at dinner time  October 15 - Warfarin 5mg AND Lovonox 100mg sub q at dinner time   TEST INR

## 2019-10-16 ENCOUNTER — ANTICOAGULATION VISIT (OUTPATIENT)
Dept: MEDICAL GROUP | Facility: PHYSICIAN GROUP | Age: 64
End: 2019-10-16
Payer: COMMERCIAL

## 2019-10-16 DIAGNOSIS — Z79.01 CHRONIC ANTICOAGULATION: ICD-10-CM

## 2019-10-16 LAB — INR PPP: 1.9 (ref 2–3.5)

## 2019-10-16 PROCEDURE — 85610 PROTHROMBIN TIME: CPT | Performed by: FAMILY MEDICINE

## 2019-10-16 PROCEDURE — 99211 OFF/OP EST MAY X REQ PHY/QHP: CPT | Performed by: FAMILY MEDICINE

## 2019-10-16 NOTE — PROGRESS NOTES
Anticoagulation Summary  As of 10/16/2019    INR goal:   2.0-3.0   TTR:   82.1 % (1.8 y)   INR used for dosin.90! (10/16/2019)   Warfarin maintenance plan:   5 mg (5 mg x 1) every Mon, Fri; 2.5 mg (5 mg x 0.5) all other days   Weekly warfarin total:   22.5 mg   Plan last modified:   Bernice HORNE Filter (2019)   Next INR check:   10/23/2019   Target end date:   Indefinite    Indications    Stroke (CMS-HCC) [I63.9] (Resolved) [I63.9]             Anticoagulation Episode Summary     INR check location:   [facility]    Preferred lab:       Send INR reminders to:       Comments:         Anticoagulation Care Providers     Provider Role Specialty Phone number    [facility] Responsible  [phone]    Marshal Sethi M.D. Responsible Neurology [phone]        Anticoagulation Patient Findings  Patient Findings     Negatives:   Signs/symptoms of thrombosis, Signs/symptoms of bleeding, Laboratory test error suspected, Change in health, Change in alcohol use, Change in activity, Upcoming invasive procedure, Emergency department visit, Upcoming dental procedure, Missed doses, Extra doses, Change in medications, Change in diet/appetite, Hospital admission, Bruising, Other complaints            HPI:   Pt seen in clinic today, on anticoagulation therapy with warfarin for history of stroke    Patient's previous INR was slightly subtherapeutic at 1.9 on 19, at which time patient was instructed to continue current regimen, then start bridging for dental procedure.  He returns to clinic today to recheck INR to ensure it is therapeutic and thus preventing possible clotting and/or bleeding/bruising complications.    Does patient have any changes to current medical/health status since last appt (Y/N):  n  Does patient have any signs/symptoms of bleeding and/or thrombosis since the last appt (Y/N):  n  Does patient have any interval changes to diet or medications since last appt (Y/N):  n  Are  there any complications or cost restrictions with current therapy (Y/N):  n       Vitals declined today    Asssessment:      INR slightly subtherapeutic at 1.9   Reason(s) for out of range INR today:  Doses held for procedure      Plan:  Instructed pt to bolus x 1 day then continue with weekly regimen; anticipate INR to continue to increase past 2.0; therefore pt can stop Lovenox     Follow up:  Because warfarin is a high risk medication and current CHEST guidelines recommend regular monitoring intervals (few days up to 12 weeks), will have patient return to clinic in 1 weeks to recheck INR.    Pt's [facility] PCP is Doug Molina M.D.    Amy Can, MaryD

## 2019-10-23 ENCOUNTER — ANTICOAGULATION VISIT (OUTPATIENT)
Dept: MEDICAL GROUP | Facility: PHYSICIAN GROUP | Age: 64
End: 2019-10-23
Payer: COMMERCIAL

## 2019-10-23 DIAGNOSIS — Z79.01 CHRONIC ANTICOAGULATION: ICD-10-CM

## 2019-10-23 LAB — INR PPP: 2.3 (ref 2–3.5)

## 2019-10-23 PROCEDURE — 93793 ANTICOAG MGMT PT WARFARIN: CPT | Performed by: FAMILY MEDICINE

## 2019-10-23 PROCEDURE — 85610 PROTHROMBIN TIME: CPT | Performed by: FAMILY MEDICINE

## 2019-10-23 NOTE — PROGRESS NOTES
Anticoagulation Summary  As of 10/23/2019    INR goal:   2.0-3.0   TTR:   82.1 % (1.8 y)   INR used for dosin.30 (10/23/2019)   Warfarin maintenance plan:   5 mg (5 mg x 1) every Mon, Fri; 2.5 mg (5 mg x 0.5) all other days   Weekly warfarin total:   22.5 mg   Plan last modified:   Bernice HORNE Filter (2019)   Next INR check:   2019   Target end date:   Indefinite    Indications    Stroke (CMS-HCC) [I63.9] (Resolved) [I63.9]             Anticoagulation Episode Summary     INR check location:   [facility]    Preferred lab:       Send INR reminders to:       Comments:         Anticoagulation Care Providers     Provider Role Specialty Phone number    [facility] Responsible  [phone]    Marshal Sethi M.D. Responsible Neurology [phone]        Anticoagulation Patient Findings  Patient Findings     Positives:   Change in health (back pain), Change in medications (took ibuprofen 200 mg on 3 separate occassions the past week)    Negatives:   Signs/symptoms of thrombosis, Signs/symptoms of bleeding, Laboratory test error suspected, Change in alcohol use, Change in activity, Upcoming invasive procedure, Emergency department visit, Upcoming dental procedure, Missed doses, Extra doses, Change in diet/appetite, Hospital admission, Bruising, Other complaints            HPI:   Pt seen in clinic today, on anticoagulation therapy with warfarin for history of stroke    Patient's previous INR was subtherapeutic at 1.9 on 10/16, at which time patient was instructed to bolus, then continue regimen.  He returns to clinic today to recheck INR to ensure it is therapeutic and thus preventing possible clotting and/or bleeding/bruising complications.      Does patient have any changes to current medical/health status since last appt (Y/N):  Yes see above  Does patient have any signs/symptoms of bleeding and/or thrombosis since the last appt (Y/N):  n  Does patient have any interval changes  to diet or medications since last appt (Y/N):  Yes see above  Are there any complications or cost restrictions with current therapy (Y/N):  n       Vitals declined    Asssessment:      INR therapeutic at 2.3    Plan:  Pt is to continue with current warfarin dosing regimen.  Pt to have back pain evaluated and monitor for s/s of bleeding while taking NSAID     Follow up:  Because warfarin is a high risk medication and current CHEST guidelines recommend regular monitoring intervals (few days up to 12 weeks), will have patient return to clinic in 2 weeks to recheck INR.    Amy Can, MaryD

## 2019-11-06 ENCOUNTER — HOSPITAL ENCOUNTER (OUTPATIENT)
Facility: MEDICAL CENTER | Age: 64
End: 2019-11-06
Attending: PHYSICIAN ASSISTANT
Payer: COMMERCIAL

## 2019-11-06 ENCOUNTER — OFFICE VISIT (OUTPATIENT)
Dept: URGENT CARE | Facility: CLINIC | Age: 64
End: 2019-11-06
Payer: COMMERCIAL

## 2019-11-06 ENCOUNTER — ANTICOAGULATION VISIT (OUTPATIENT)
Dept: MEDICAL GROUP | Facility: PHYSICIAN GROUP | Age: 64
End: 2019-11-06
Payer: COMMERCIAL

## 2019-11-06 VITALS
RESPIRATION RATE: 14 BRPM | BODY MASS INDEX: 22.58 KG/M2 | WEIGHT: 149 LBS | SYSTOLIC BLOOD PRESSURE: 106 MMHG | HEART RATE: 65 BPM | TEMPERATURE: 98.1 F | DIASTOLIC BLOOD PRESSURE: 64 MMHG | HEIGHT: 68 IN | OXYGEN SATURATION: 96 %

## 2019-11-06 DIAGNOSIS — R30.0 DYSURIA: ICD-10-CM

## 2019-11-06 DIAGNOSIS — Z79.01 CHRONIC ANTICOAGULATION: ICD-10-CM

## 2019-11-06 LAB
APPEARANCE UR: CLEAR
BILIRUB UR STRIP-MCNC: NEGATIVE MG/DL
COLOR UR AUTO: YELLOW
GLUCOSE UR STRIP.AUTO-MCNC: NEGATIVE MG/DL
INR PPP: 2.1 (ref 2–3.5)
KETONES UR STRIP.AUTO-MCNC: NEGATIVE MG/DL
LEUKOCYTE ESTERASE UR QL STRIP.AUTO: NEGATIVE
NITRITE UR QL STRIP.AUTO: NEGATIVE
PH UR STRIP.AUTO: 7 [PH] (ref 5–8)
PROT UR QL STRIP: NEGATIVE MG/DL
RBC UR QL AUTO: NEGATIVE
SP GR UR STRIP.AUTO: 1.01
UROBILINOGEN UR STRIP-MCNC: NEGATIVE MG/DL

## 2019-11-06 PROCEDURE — 99213 OFFICE O/P EST LOW 20 MIN: CPT | Performed by: PHYSICIAN ASSISTANT

## 2019-11-06 PROCEDURE — 99999 POCT PROTIME: CPT | Performed by: FAMILY MEDICINE

## 2019-11-06 PROCEDURE — 87086 URINE CULTURE/COLONY COUNT: CPT

## 2019-11-06 PROCEDURE — 81002 URINALYSIS NONAUTO W/O SCOPE: CPT | Performed by: PHYSICIAN ASSISTANT

## 2019-11-06 ASSESSMENT — ENCOUNTER SYMPTOMS
DIZZINESS: 0
VOMITING: 0
FEVER: 0
PALPITATIONS: 0
BLURRED VISION: 0
FLANK PAIN: 0
CHILLS: 0
SHORTNESS OF BREATH: 0
ABDOMINAL PAIN: 0
NAUSEA: 0
BACK PAIN: 0

## 2019-11-06 NOTE — PROGRESS NOTES
Anticoagulation Summary  As of 2019    INR goal:   2.0-3.0   TTR:   82.4 % (1.9 y)   INR used for dosin.10 (2019)   Warfarin maintenance plan:   5 mg (5 mg x 1) every Mon, Fri; 2.5 mg (5 mg x 0.5) all other days   Weekly warfarin total:   22.5 mg   Plan last modified:   Bernice Nielson (2019)   Next INR check:   2019   Target end date:   Indefinite    Indications    Stroke (CMS-HCC) [I63.9] (Resolved) [I63.9]             Anticoagulation Episode Summary     INR check location:   [facility]ticoagulation Clinic    Preferred lab:       Send INR reminders to:       Comments:         Anticoagulation Care Providers     Provider Role Specialty Phone number    [facility] Responsible  [phone]    Marshal Sethi M.D. Responsible Neurology [phone]        Anticoagulation Patient Findings          History of Present Illness: follow up appointment for chronic anticoagulation with the high risk medication, warfarin for stroke.    Pt c/o s/s of UTI.  Referred pt to PCP or UC for evaluation today.  Pt agrees to go to UC after neurology appointment with Dr. Sethi today.  Pt will call me with name of antibiotic, if one is prescribed.    Pt remains therapeutic today. Continue current dosing regimen.  Follow up in 4 weeks, to reduce the risk of adverse events related to this high risk medication, warfarin.    Pt declines vitals today    Bernice Nielson, Clinical Pharmacist

## 2019-11-06 NOTE — PROGRESS NOTES
Subjective:      Hieu Duong is a 64 y.o. male who presents with Painful Urination      Dysuria    This is a new problem. The current episode started in the past 7 days (SStarted 2-3 days ago). The problem occurs every urination. The problem has been gradually improving. The quality of the pain is described as burning. The pain is mild. There has been no fever. He is not sexually active. There is no history of pyelonephritis. Associated symptoms include hesitancy. Pertinent negatives include no chills, discharge, flank pain, frequency, hematuria, nausea, urgency or vomiting. He has tried nothing for the symptoms.       Review of Systems   Constitutional: Negative for chills, fever and malaise/fatigue.   Eyes: Negative for blurred vision.   Respiratory: Negative for shortness of breath.    Cardiovascular: Negative for chest pain and palpitations.   Gastrointestinal: Negative for abdominal pain, nausea and vomiting.   Genitourinary: Positive for dysuria and hesitancy. Negative for flank pain, frequency, hematuria and urgency.   Musculoskeletal: Negative for back pain.   Neurological: Negative for dizziness.       PMH:  has a past medical history of Diabetes ([facility]), GI bleed, Hyperlipidemia, Hypertension, Stroke ([facility]), and Vision loss.  MEDS:   Current Outpatient Medications:   •  Insulin Degludec (TRESIBA) 100 UNIT/ML Solution, Inject 24 Units as instructed., Disp: , Rfl:   •  warfarin (COUMADIN) 5 MG Tab, TAKE ONE-HALF TO ONE TABLET DAILY AS DIRECTED BY [facility], Disp: 90 Tab, Rfl: 1  •  omeprazole (PRILOSEC) 20 MG delayed-release capsule, Take 20 mg by mouth every day., Disp: , Rfl:   •  atorvastatin (LIPITOR) 10 MG Tab, Take 10 mg by mouth every evening., Disp: , Rfl:   •  insulin lispro (HUMALOG) 100 UNIT/ML Solution, Inject  as instructed 3 times a day before meals., Disp: , Rfl:   •  enalapril (VASOTEC) 5 MG Tab, Take 5 mg by mouth every day., Disp: , Rfl:   •  enoxaparin (LOVENOX)  "100 MG/ML Solution inj, Inject 100 mg as instructed every day. (Patient not taking: Reported on 11/6/2019), Disp: 5 Syringe, Rfl: 1  •  Insulin Glargine (TOUJEO SOLOSTAR) 300 UNIT/ML Solution Pen-injector, Inject  as instructed., Disp: , Rfl:   ALLERGIES:   Allergies   Allergen Reactions   • Tdap [Dipth, Acell Pertus, Tetanus]      SURGHX: History reviewed. No pertinent surgical history.  SOCHX:  reports that he has never smoked. He has never used smokeless tobacco. He reports that he does not drink alcohol or use drugs.  FH: Family history was reviewed, no pertinent findings to report     Objective:     /64 (BP Location: Left arm, Patient Position: Sitting)   Pulse 65   Temp 36.7 °C (98.1 °F)   Resp 14   Ht 1.715 m (5' 7.5\")   Wt 67.6 kg (149 lb)   SpO2 96%   BMI 22.99 kg/m²      Physical Exam  Constitutional:       Appearance: Normal appearance. He is well-developed.   HENT:      Head: Normocephalic and atraumatic.      Right Ear: External ear normal.      Left Ear: External ear normal.   Eyes:      Conjunctiva/sclera: Conjunctivae normal.      Pupils: Pupils are equal, round, and reactive to light.   Cardiovascular:      Rate and Rhythm: Normal rate and regular rhythm.      Heart sounds: No murmur.   Pulmonary:      Effort: Pulmonary effort is normal.      Breath sounds: Normal breath sounds.   Abdominal:      Palpations: Abdomen is soft.      Tenderness: There is no tenderness.   Skin:     General: Skin is warm and dry.      Capillary Refill: Capillary refill takes less than 2 seconds.   Neurological:      Mental Status: He is alert and oriented to person, place, and time.   Psychiatric:         Behavior: Behavior normal.         Judgment: Judgment normal.       POCT Urinalysis - WNL     Assessment/Plan:     1. Dysuria  - POCT Urinalysis  - URINE CULTURE(NEW); Future  *POCT urinalysis is negative for infection and patient does report that his symptoms have been gradually improving.  We will send the " urine off for culture just to definitively make sure there is no infection.  If urine culture is negative and patient's symptoms persist he will need to follow-up with his PCP for further evaluation.          Differential Diagnosis, natural history, and supportive care discussed. Return to the Urgent Care or follow up with your PCP if symptoms fail to resolve, or for any new or worsening symptoms. Emergency room precautions discussed. Patient and/or family appears understanding of information.

## 2019-11-07 DIAGNOSIS — R30.0 DYSURIA: ICD-10-CM

## 2019-11-09 LAB
BACTERIA UR CULT: NORMAL
SIGNIFICANT IND 70042: NORMAL
SITE SITE: NORMAL
SOURCE SOURCE: NORMAL

## 2019-12-04 ENCOUNTER — ANTICOAGULATION VISIT (OUTPATIENT)
Dept: MEDICAL GROUP | Facility: PHYSICIAN GROUP | Age: 64
End: 2019-12-04
Payer: COMMERCIAL

## 2019-12-04 DIAGNOSIS — Z79.01 CHRONIC ANTICOAGULATION: ICD-10-CM

## 2019-12-04 LAB — INR PPP: 2.9 (ref 2–3.5)

## 2019-12-04 PROCEDURE — 93793 ANTICOAG MGMT PT WARFARIN: CPT | Performed by: FAMILY MEDICINE

## 2019-12-04 PROCEDURE — 85610 PROTHROMBIN TIME: CPT | Performed by: FAMILY MEDICINE

## 2019-12-04 NOTE — PROGRESS NOTES
Anticoagulation Summary  As of 2019    INR goal:   2.0-3.0   TTR:   83.1 % (1.9 y)   INR used for dosin.90 (2019)   Warfarin maintenance plan:   5 mg (5 mg x 1) every Mon, Fri; 2.5 mg (5 mg x 0.5) all other days   Weekly warfarin total:   22.5 mg   Plan last modified:   Bernice Nielson (2019)   Next INR check:   2020   Target end date:   Indefinite    Indications    Stroke (CMS-HCC) [I63.9] (Resolved) [I63.9]             Anticoagulation Episode Summary     INR check location:   Anticoagulation Clinic    Preferred lab:       Send INR reminders to:       Comments:         Anticoagulation Care Providers     Provider Role Specialty Phone number    [facility] Responsible  [phone]    Marshal Stehi M.D. Responsible Neurology [phone]        Anticoagulation Patient Findings          History of Present Illness: follow up appointment for chronic anticoagulation with the high risk medication, warfarin for stroke.    Last INR was in range, no dosage adjustment needed.    INR is therapeutic  Pt denies any unusual s/s of bleeding, bruising, clotting or any changes to diet or medications.     Pt is to continue with current warfarin dosing regimen.       Follow up in 5 weeks, to reduce the risk of adverse events related to this high risk medication, warfarin.    Flor Jenkins, Pharmacy Intern    I have reviewed and concur with the above plan on 2019.    Bernice Nielson, Clinical Pharmacist

## 2020-01-08 ENCOUNTER — ANTICOAGULATION VISIT (OUTPATIENT)
Dept: MEDICAL GROUP | Facility: PHYSICIAN GROUP | Age: 65
End: 2020-01-08
Payer: COMMERCIAL

## 2020-01-08 DIAGNOSIS — Z79.01 CHRONIC ANTICOAGULATION: ICD-10-CM

## 2020-01-08 LAB — INR PPP: 2.2 (ref 2–3.5)

## 2020-01-08 PROCEDURE — 85610 PROTHROMBIN TIME: CPT | Performed by: FAMILY MEDICINE

## 2020-01-08 PROCEDURE — 93793 ANTICOAG MGMT PT WARFARIN: CPT | Performed by: FAMILY MEDICINE

## 2020-01-08 NOTE — PROGRESS NOTES
Anticoagulation Summary  As of 2020    INR goal:   2.0-3.0   TTR:   83.9 % (2 y)   INR used for dosin.20 (2020)   Warfarin maintenance plan:   5 mg (5 mg x 1) every Mon, Fri; 2.5 mg (5 mg x 0.5) all other days   Weekly warfarin total:   22.5 mg   Plan last modified:   Bernice HORNE Filter (2019)   Next INR check:   2020   Target end date:   Indefinite    Indications    Stroke (CMS-HCC) [I63.9] (Resolved) [I63.9]             Anticoagulation Episode Summary     INR check location:   Anticoagulation Clinic    Preferred lab:       Send INR reminders to:       Comments:         Anticoagulation Care Providers     Provider Role Specialty Phone number    [facility] Responsible  [phone]    Marshal Sethi M.D. Responsible Neurology [phone]                Anticoagulation Patient Findings  Patient Findings     Negatives:   Signs/symptoms of thrombosis, Signs/symptoms of bleeding, Laboratory test error suspected, Change in health, Change in alcohol use, Change in activity, Upcoming invasive procedure, Emergency department visit, Upcoming dental procedure, Missed doses, Extra doses, Change in medications, Change in diet/appetite, Hospital admission, Bruising, Other complaints          HPI:  Hieu Duong seen in clinic today, on anticoagulation therapy with warfarin for stroke  Changes to current medical/health status since last appt: none  Denies signs/symptoms of bleeding and/or thrombosis since the last appt.    Denies any interval changes to diet  Denies any interval changes to medications since last appt.   Denies any complications or cost restrictions with current therapy.   BP check declined      A/P   INR  therapeutic.   Pt is to continue with current warfarin dosing regimen.    Follow up appointment in 6 week(s).    Amy aCn, PharmD

## 2020-02-19 ENCOUNTER — ANTICOAGULATION VISIT (OUTPATIENT)
Dept: MEDICAL GROUP | Facility: PHYSICIAN GROUP | Age: 65
End: 2020-02-19
Payer: COMMERCIAL

## 2020-02-19 DIAGNOSIS — Z79.01 CHRONIC ANTICOAGULATION: ICD-10-CM

## 2020-02-19 LAB — INR PPP: 2.6 (ref 2–3.5)

## 2020-02-19 PROCEDURE — 93793 ANTICOAG MGMT PT WARFARIN: CPT | Performed by: FAMILY MEDICINE

## 2020-02-19 PROCEDURE — 85610 PROTHROMBIN TIME: CPT | Performed by: FAMILY MEDICINE

## 2020-04-01 ENCOUNTER — ANTICOAGULATION VISIT (OUTPATIENT)
Dept: MEDICAL GROUP | Facility: PHYSICIAN GROUP | Age: 65
End: 2020-04-01
Payer: COMMERCIAL

## 2020-04-01 DIAGNOSIS — Z79.01 CHRONIC ANTICOAGULATION: ICD-10-CM

## 2020-04-01 LAB — INR PPP: 2.9 (ref 2–3.5)

## 2020-04-01 PROCEDURE — 85610 PROTHROMBIN TIME: CPT | Performed by: PHYSICIAN ASSISTANT

## 2020-04-01 PROCEDURE — 93793 ANTICOAG MGMT PT WARFARIN: CPT | Performed by: PHYSICIAN ASSISTANT

## 2020-05-27 ENCOUNTER — ANTICOAGULATION VISIT (OUTPATIENT)
Dept: MEDICAL GROUP | Facility: PHYSICIAN GROUP | Age: 65
End: 2020-05-27
Payer: COMMERCIAL

## 2020-05-27 DIAGNOSIS — Z79.01 CHRONIC ANTICOAGULATION: ICD-10-CM

## 2020-05-27 LAB — INR PPP: 2.1 (ref 2–3.5)

## 2020-05-27 PROCEDURE — 85610 PROTHROMBIN TIME: CPT | Performed by: FAMILY MEDICINE

## 2020-05-27 PROCEDURE — 93793 ANTICOAG MGMT PT WARFARIN: CPT | Performed by: FAMILY MEDICINE

## 2020-05-27 NOTE — PROGRESS NOTES
Anticoagulation Summary  As of 2020    INR goal:   2.0-3.0   TTR:   86.5 % (2.4 y)   INR used for dosin.10 (2020)   Warfarin maintenance plan:   5 mg (5 mg x 1) every Mon, Fri; 2.5 mg (5 mg x 0.5) all other days   Weekly warfarin total:   22.5 mg   Plan last modified:   Bernice Nielson (2019)   Next INR check:   2020   Target end date:   Indefinite    Indications    Stroke (CMS-HCC) [I63.9] (Resolved) [I63.9]             Anticoagulation Episode Summary     INR check location:   Anticoagulation Clinic    Preferred lab:       Send INR reminders to:       Comments:         Anticoagulation Care Providers     Provider Role Specialty Phone number    [facility] Responsible  [phone]    Marshal Sethi M.D. Responsible Neurology [phone]        Anticoagulation Patient Findings          History of Present Illness: follow up appointment for chronic anticoagulation with the high risk medication, warfarin for CVA    Pt remains therapeutic today. Continue current dosing regimen.  Follow up in 10 weeks, to reduce the risk of adverse events related to this high risk medication, warfarin.      Pt declines vitals today    Bernice Nielson, Clinical Pharmacist

## 2020-05-31 DIAGNOSIS — Z86.73 HISTORY OF CVA (CEREBROVASCULAR ACCIDENT): ICD-10-CM

## 2020-06-01 RX ORDER — WARFARIN SODIUM 5 MG/1
TABLET ORAL
Qty: 90 TAB | Refills: 1 | Status: SHIPPED | OUTPATIENT
Start: 2020-06-01 | End: 2020-12-18

## 2020-08-05 ENCOUNTER — ANTICOAGULATION VISIT (OUTPATIENT)
Dept: MEDICAL GROUP | Facility: PHYSICIAN GROUP | Age: 65
End: 2020-08-05
Payer: MEDICARE

## 2020-08-05 DIAGNOSIS — Z79.01 CHRONIC ANTICOAGULATION: ICD-10-CM

## 2020-08-05 LAB — INR PPP: 2.3 (ref 2–3.5)

## 2020-08-05 PROCEDURE — 99211 OFF/OP EST MAY X REQ PHY/QHP: CPT | Performed by: FAMILY MEDICINE

## 2020-08-05 PROCEDURE — 85610 PROTHROMBIN TIME: CPT | Performed by: FAMILY MEDICINE

## 2020-08-05 NOTE — PROGRESS NOTES
Anticoagulation Summary  As of 2020    INR goal:  2.0-3.0   TTR:  87.5 % (2.6 y)   INR used for dosin.30 (2020)   Warfarin maintenance plan:  5 mg (5 mg x 1) every Mon, Fri; 2.5 mg (5 mg x 0.5) all other days   Weekly warfarin total:  22.5 mg   Plan last modified:  Bernice HORNE Filter (2019)   Next INR check:  2020   Target end date:  Indefinite    Indications    Stroke (CMS-HCC) [I63.9] (Resolved) [I63.9]             Anticoagulation Episode Summary     INR check location:  Anticoagulation Clinic    Preferred lab:      Send INR reminders to:      Comments:        Anticoagulation Care Providers     Provider Role Specialty Phone number    [facility] Responsible  [phone]    Marshal Sethi M.D. Responsible Neurology [phone]        Anticoagulation Patient Findings  Patient Findings     Negatives:  Signs/symptoms of thrombosis, Signs/symptoms of bleeding, Laboratory test error suspected, Change in health, Change in alcohol use, Change in activity, Upcoming invasive procedure, Emergency department visit, Upcoming dental procedure, Missed doses, Extra doses, Change in medications, Change in diet/appetite, Hospital admission, Bruising, Other complaints              History of Present Illness: follow up appointment for chronic anticoagulation with the high risk medication, warfarin for stroke  Pt remains therapeutic today.  Pt is requesting lovenox bridging for a tooth extraction/bridge 2020 as outlined below     - last dose of warfarin  - No warfarin Lovenox 100mg sub q at dinner time  August 10-  No warfarin Lovenox 100mg sub q at dinner time  -  No warfarin Lovenox 100mg sub q at dinner time  - NO BLOOD THINNER    - No shot, ok to restart warfarin 5mg after procedure  - Warfarin 5mg AND Lovenox 100mg sub q at dinner time  August 15- Warfarin 2.5mg AND Lovenox 100mg sub q at dinnertime   -Warfarin 2.5mg AND  Lovenox 100mg sub q at dinnertime  August 17- Warfarin 5mg AND Lovenox 100mg sub q at dinnertime  August 18- Warfarin 2.5mg AND Lovenox 100mg sub q at dinnertime  Recheck INR 8-  Bernice Nielson, Clinical Pharmacist, CDE, CACP

## 2020-08-05 NOTE — PATIENT INSTRUCTIONS
Pt is requesting lovenox bridging for a tooth extraction/bridge 8- as outlined below    August 8 - last dose of warfarin  August 9- No warfarin Lovenox 100mg sub q at dinner time  August 10-  No warfarin Lovenox 100mg sub q at dinner time  August 11-  No warfarin Lovenox 100mg sub q at dinner time  August 12- NO BLOOD THINNER   August 13 - No shot, ok to restart warfarin 5mg after procedure  August 14- Warfarin 5mg AND Lovenox 100mg sub q at dinner time  August 15- Warfarin 2.5mg AND Lovenox 100mg sub q at dinnertime  August 16 -Warfarin 2.5mg AND Lovenox 100mg sub q at dinnertime  August 17- Warfarin 5mg AND Lovenox 100mg sub q at dinnertime  August 18- Warfarin 2.5mg AND Lovenox 100mg sub q at dinnertime  Recheck INR 8-

## 2020-08-06 ENCOUNTER — TELEPHONE (OUTPATIENT)
Dept: VASCULAR LAB | Facility: MEDICAL CENTER | Age: 65
End: 2020-08-06

## 2020-08-06 NOTE — TELEPHONE ENCOUNTER
[facility]    Pt called stating his dentist does not want to interrupt OAC for single tooth extraction.  Pt will continue with usual warfarin regimen and follow up in 10 weeks.    Saurav Walsh, MaryD

## 2020-09-30 ENCOUNTER — ANTICOAGULATION VISIT (OUTPATIENT)
Dept: MEDICAL GROUP | Facility: PHYSICIAN GROUP | Age: 65
End: 2020-09-30
Payer: MEDICARE

## 2020-09-30 DIAGNOSIS — Z79.01 CHRONIC ANTICOAGULATION: ICD-10-CM

## 2020-09-30 LAB — INR PPP: 2.1 (ref 2–3.5)

## 2020-09-30 PROCEDURE — 99999 PR NO CHARGE: CPT | Performed by: FAMILY MEDICINE

## 2020-09-30 PROCEDURE — 85610 PROTHROMBIN TIME: CPT | Performed by: FAMILY MEDICINE

## 2020-09-30 PROCEDURE — 93793 ANTICOAG MGMT PT WARFARIN: CPT | Performed by: FAMILY MEDICINE

## 2020-09-30 NOTE — PROGRESS NOTES
Anticoagulation Summary  As of 2020    INR goal:  2.0-3.0   TTR:  88.1 % (2.8 y)   INR used for dosin.10 (2020)   Warfarin maintenance plan:  5 mg (5 mg x 1) every Mon, Fri; 2.5 mg (5 mg x 0.5) all other days   Weekly warfarin total:  22.5 mg   Plan last modified:  Bernice HORNE Filter (2019)   Next INR check:  2020   Target end date:  Indefinite    Indications    Stroke (CMS-HCC) [I63.9] (Resolved) [I63.9]             Anticoagulation Episode Summary     INR check location:  Anticoagulation Clinic    Preferred lab:      Send INR reminders to:      Comments:        Anticoagulation Care Providers     Provider Role Specialty Phone number    [facility] Responsible  [phone]    Marshal Sethi M.D. Responsible Neurology [phone]             Anticoagulation Patient Findings  Patient Findings     Negatives:  Signs/symptoms of thrombosis, Signs/symptoms of bleeding, Laboratory test error suspected, Change in health, Change in alcohol use, Change in activity, Upcoming invasive procedure, Emergency department visit, Upcoming dental procedure, Missed doses, Extra doses, Change in medications, Change in diet/appetite, Hospital admission, Bruising, Other complaints          HPI:  Hieu Duong seen in clinic today, on anticoagulation therapy with warfarin for stroke  Changes to current medical/health status since last appt: none  Denies signs/symptoms of bleeding and/or thrombosis since the last appt.    Denies any interval changes to diet  Denies any interval changes to medications since last appt.   Denies any complications or cost restrictions with current therapy.   Verified current warfarin dosing schedule.   BP declined    A/P   INR  therapeutic.   Pt is to continue with current warfarin dosing regimen.    Follow up appointment in 6 week(s).    Amy Can, PharmD

## 2020-11-11 ENCOUNTER — ANTICOAGULATION VISIT (OUTPATIENT)
Dept: MEDICAL GROUP | Facility: PHYSICIAN GROUP | Age: 65
End: 2020-11-11
Payer: MEDICARE

## 2020-11-11 DIAGNOSIS — Z79.01 CHRONIC ANTICOAGULATION: ICD-10-CM

## 2020-11-11 LAB — INR PPP: 2.3 (ref 2–3.5)

## 2020-11-11 PROCEDURE — 85610 PROTHROMBIN TIME: CPT | Performed by: FAMILY MEDICINE

## 2020-11-11 PROCEDURE — 99999 PR NO CHARGE: CPT | Performed by: FAMILY MEDICINE

## 2020-11-11 PROCEDURE — 93793 ANTICOAG MGMT PT WARFARIN: CPT | Performed by: FAMILY MEDICINE

## 2020-11-11 NOTE — PROGRESS NOTES
Anticoagulation Summary  As of 2020    INR goal:  2.0-3.0   TTR:  88.6 % (2.9 y)   INR used for dosin.30 (2020)   Warfarin maintenance plan:  5 mg (5 mg x 1) every Mon, Fri; 2.5 mg (5 mg x 0.5) all other days   Weekly warfarin total:  22.5 mg   Plan last modified:  Bernice Nielson (2019)   Next INR check:  2020   Target end date:  Indefinite    Indications    Stroke (CMS-HCC) [I63.9] (Resolved) [I63.9]             Anticoagulation Episode Summary     INR check location:  Anticoagulation Clinic    Preferred lab:      Send INR reminders to:      Comments:        Anticoagulation Care Providers     Provider Role Specialty Phone number    [facility] Responsible  [phone]    Marshal Sethi M.D. Responsible Neurology [phone]        Anticoagulation Patient Findings  Patient Findings     Negatives:  Signs/symptoms of thrombosis, Signs/symptoms of bleeding, Laboratory test error suspected, Change in health, Change in alcohol use, Change in activity, Upcoming invasive procedure, Emergency department visit, Upcoming dental procedure, Missed doses, Extra doses, Change in medications, Change in diet/appetite, Hospital admission, Bruising, Other complaints              History of Present Illness: follow up appointment for chronic anticoagulation with the high risk medication, warfarin for stroke    Pt remains therapeutic today. Continue current dosing regimen.  Pt prefers to retest in 6 weeks.  Pt declines vitals today.    Bernice Nielson, Clinical Pharmacist, CDE, CACP

## 2020-12-18 DIAGNOSIS — Z86.73 HISTORY OF CVA (CEREBROVASCULAR ACCIDENT): ICD-10-CM

## 2020-12-18 RX ORDER — WARFARIN SODIUM 5 MG/1
TABLET ORAL
Qty: 90 TAB | Refills: 1 | Status: SHIPPED | OUTPATIENT
Start: 2020-12-18 | End: 2022-01-26 | Stop reason: SDUPTHER

## 2020-12-23 ENCOUNTER — APPOINTMENT (OUTPATIENT)
Dept: MEDICAL GROUP | Facility: PHYSICIAN GROUP | Age: 65
End: 2020-12-23
Payer: MEDICARE

## 2020-12-30 ENCOUNTER — ANTICOAGULATION VISIT (OUTPATIENT)
Dept: MEDICAL GROUP | Facility: PHYSICIAN GROUP | Age: 65
End: 2020-12-30
Payer: MEDICARE

## 2020-12-30 DIAGNOSIS — Z79.01 CHRONIC ANTICOAGULATION: ICD-10-CM

## 2020-12-30 LAB — INR PPP: 2.6 (ref 2–3.5)

## 2020-12-30 PROCEDURE — 85610 PROTHROMBIN TIME: CPT | Performed by: FAMILY MEDICINE

## 2020-12-30 PROCEDURE — 99211 OFF/OP EST MAY X REQ PHY/QHP: CPT | Performed by: FAMILY MEDICINE

## 2020-12-30 NOTE — PROGRESS NOTES
Anticoagulation Summary  As of 2020    INR goal:  2.0-3.0   TTR:  89.1 % (3 y)   INR used for dosin.60 (2020)   Warfarin maintenance plan:  5 mg (5 mg x 1) every Mon, Fri; 2.5 mg (5 mg x 0.5) all other days   Weekly warfarin total:  22.5 mg   Plan last modified:  Bernice HORNE Filter (2019)   Next INR check:     Target end date:  Indefinite    Indications    Stroke (CMS-HCC) [I63.9] (Resolved) [I63.9]             Anticoagulation Episode Summary     INR check location:  Anticoagulation Clinic    Preferred lab:      Send INR reminders to:      Comments:        Anticoagulation Care Providers     Provider Role Specialty Phone number    [facility] Responsible  [phone]    Marshal Sethi M.D. Responsible Neurology [phone]        Anticoagulation Patient Findings  Patient Findings     Negatives:  Signs/symptoms of thrombosis, Signs/symptoms of bleeding, Laboratory test error suspected, Change in health, Change in alcohol use, Change in activity, Upcoming invasive procedure, Emergency department visit, Upcoming dental procedure, Missed doses, Extra doses, Change in medications, Change in diet/appetite, Hospital admission, Bruising, Other complaints              History of Present Illness: follow up appointment for chronic anticoagulation with the high risk medication, warfarin for stroke.    Pt remains therapeutic today. Pt is scheduled [facility]  Pt will require lovenox bridging as outlined below.     - last dose of warfarin   - No warfarin, lovenox 100 mg sub q at dinner time  January 15 - No warfarin, lovenox 100 mg sub q at dinner time   - No warfarin, lovenox 100 mg sub q at dinner time  - NO BLOOD THINNER   - PROCEDURE DAY, ok to restart warfarin NO shot  - Warfarin 5mg AND Lovenox 100 mg sub q at dinner time  - Warfarin 5mg AND Lovenox 100 mg sub q at dinner  time  January 21- Warfarin 5mg AND Lovenox 100 mg sub q at dinner time  January 22 - Warfarin 5mg AND Lovenox 100 mg sub q at dinner time  January 23- Warfarin 2.5 mg AND Lovenox 100mg sub q at dinner time  January 24 Warfarin 2.5mg   Bernice Nielson, Clinical Pharmacist, CDE, CACP

## 2020-12-30 NOTE — PATIENT INSTRUCTIONS
Pt remains therapeutic today. Pt is scheduled for colonoscopy 01/18/[facility] at [facility].  Pt will require lovenox bridging as outlined below.    January 13 - last dose of warfarin  January 14 - No warfarin, lovenox 100 mg sub q at dinner time  January 15 - No warfarin, lovenox 100 mg sub q at dinner time  January 16 - No warfarin, lovenox 100 mg sub q at dinner time  January 17- NO BLOOD THINNER   January 18- PROCEDURE DAY, ok to restart warfarin NO shot  January 19- Warfarin 5mg AND Lovenox 100 mg sub q at dinner time  January 20- Warfarin 5mg AND Lovenox 100 mg sub q at dinner time  January 21- Warfarin 5mg AND Lovenox 100 mg sub q at dinner time  January 22 - Warfarin 5mg AND Lovenox 100 mg sub q at dinner time  January 23- Warfarin 2.5 mg AND Lovenox 100mg sub q at dinner time  January 24 Warfarin 2.5mg

## 2021-01-27 ENCOUNTER — ANTICOAGULATION VISIT (OUTPATIENT)
Dept: MEDICAL GROUP | Facility: PHYSICIAN GROUP | Age: 66
End: 2021-01-27
Payer: MEDICARE

## 2021-01-27 DIAGNOSIS — Z79.01 CHRONIC ANTICOAGULATION: ICD-10-CM

## 2021-01-27 LAB
INR PPP: 2.2 (ref 2–3.5)
INR PPP: 2.2 (ref 2–3.5)

## 2021-01-27 PROCEDURE — 99999 PR NO CHARGE: CPT | Performed by: FAMILY MEDICINE

## 2021-01-27 PROCEDURE — 85610 PROTHROMBIN TIME: CPT | Performed by: FAMILY MEDICINE

## 2021-01-27 PROCEDURE — 93793 ANTICOAG MGMT PT WARFARIN: CPT | Performed by: FAMILY MEDICINE

## 2021-01-27 NOTE — PROGRESS NOTES
Anticoagulation Summary  As of 2021    INR goal:  2.0-3.0   TTR:  89.4 % (3.1 y)   INR used for dosin.20 (2021)   Warfarin maintenance plan:  5 mg (5 mg x 1) every Mon, Fri; 2.5 mg (5 mg x 0.5) all other days   Weekly warfarin total:  22.5 mg   Plan last modified:  Bernice Nielson (2019)   Next INR check:  2021   Target end date:  Indefinite    Indications    Stroke (CMS-HCC) [I63.9] (Resolved) [I63.9]             Anticoagulation Episode Summary     INR check location:  Anticoagulation Clinic    Preferred lab:      Send INR reminders to:      Comments:        Anticoagulation Care Providers     Provider Role Specialty Phone number    [facility] Responsible  [phone]    Marshal Sethi M.D. Responsible Neurology [phone]        Anticoagulation Patient Findings  Patient Findings     Negatives:  Signs/symptoms of thrombosis, Signs/symptoms of bleeding, Laboratory test error suspected, Change in health, Change in alcohol use, Change in activity, Upcoming invasive procedure, Emergency department visit, Upcoming dental procedure, Missed doses, Extra doses, Change in medications, Change in diet/appetite, Hospital admission, Bruising, Other complaints              History of Present Illness: follow up appointment for chronic anticoagulation with the high risk medication, warfarin for stroke    Last INR was out of range, dosage adjusted: pt is back to goal. Continue current dosing regimen.  Follow up in 4 weeks, to reduce the risk of adverse events related to this high risk medication, warfarin.    Bernice Nielson, Clinical Pharmacist

## 2021-02-24 ENCOUNTER — ANTICOAGULATION VISIT (OUTPATIENT)
Dept: MEDICAL GROUP | Facility: PHYSICIAN GROUP | Age: 66
End: 2021-02-24
Payer: MEDICARE

## 2021-02-24 DIAGNOSIS — Z79.01 CHRONIC ANTICOAGULATION: ICD-10-CM

## 2021-02-24 LAB — INR PPP: 1.9 (ref 2–3.5)

## 2021-02-24 PROCEDURE — 85610 PROTHROMBIN TIME: CPT | Performed by: FAMILY MEDICINE

## 2021-02-24 PROCEDURE — 99211 OFF/OP EST MAY X REQ PHY/QHP: CPT | Performed by: FAMILY MEDICINE

## 2021-02-24 NOTE — PROGRESS NOTES
[facility] Anticoagulation Service Note    Date: 2021    Anticoagulation Summary  As of 2021    INR goal:  2.0-3.0   TTR:  88.8 % (3.2 y)   INR used for dosin.90 (2021)   Warfarin maintenance plan:  5 mg (5 mg x 1) every Mon, Fri; 2.5 mg (5 mg x 0.5) all other days   Weekly warfarin total:  22.5 mg   Plan last modified:  Bernice Nielson (2019)   Next INR check:  3/10/2021   Target end date:  Indefinite    Indications    Stroke (CMS-HCC) [I63.9] (Resolved) [I63.9]             Anticoagulation Episode Summary     INR check location:  Anticoagulation Clinic    Preferred lab:      Send INR reminders to:      Comments:        Anticoagulation Care Providers     Provider Role Specialty Phone number    [facility] Responsible  [phone]    Marshal Sethi M.D. Responsible Neurology [phone]        Anticoagulation Patient Findings      HPI:   Hieu Diallolanie is in the Anticoagulation Clinic today for a INR check on their anticoagulation therapy.     The reason for today's visit is to prevent morbidity and mortality from a blood clot and/or stroke and to reduce the risk of bleeding while on a anticoagulant.     PCP:  Doug Molina M.D.  [address] [facility] [address] [phone]    3 vitals included with today's appt-unless patient declined:  (BP, HR, weight, ht, RR)   There were no vitals filed for this visit.    Assessment:   INR  SUB-therapeutic.   Confirmed warfarin dosing regimen: Yes  Interval Changes with foods rich in vitamin K: No  Interval Changes in ETOH or cranberries:   No  Interval Changes in smoking status:  No  Interval Changes in medication:  No   S/S of bleeding or bruising:  No  Signs/symptoms  thrombosis since the last appt:  No  Any upcoming procedures that require stopping warfarin and/or using bridge therapy: None    Pt is not on antiplatelet therapy.    Plan:  Instructed pt to bolus x 1 dose w/ 5 mg and to then continue on with his  current regimen    Follow up:  Follow up appointment in 2 week(s).       Other info:  Pt educated to contact our clinic with any changes in medications or s/s of bleeding or thrombosis.  Education was provided today regarding tips to reduce their bleed risk and dietary constraints while on a anticoagulant.    National Recommendations:  The CHEST guidelines recommends frequent INR monitoring at regular intervals (a few days up to a max of 12 weeks) to ensure patients are on the proper dose of warfarin, and patients are not having any complications from therapy.  INRs can dramatically change over a short time period due to diet, medications, and medical conditions.     Jordan So, PharmD    [facility]  Phone [phone] fax [phone]

## 2021-03-10 ENCOUNTER — ANTICOAGULATION VISIT (OUTPATIENT)
Dept: MEDICAL GROUP | Facility: PHYSICIAN GROUP | Age: 66
End: 2021-03-10
Payer: MEDICARE

## 2021-03-10 DIAGNOSIS — Z86.73 HISTORY OF CVA (CEREBROVASCULAR ACCIDENT): ICD-10-CM

## 2021-03-10 LAB — INR PPP: 2.1 (ref 2–3.5)

## 2021-03-10 PROCEDURE — 93793 ANTICOAG MGMT PT WARFARIN: CPT | Performed by: FAMILY MEDICINE

## 2021-03-10 PROCEDURE — 85610 PROTHROMBIN TIME: CPT | Performed by: FAMILY MEDICINE

## 2021-03-10 NOTE — PROGRESS NOTES
Anticoagulation Summary  As of 3/10/2021    INR goal:  2.0-3.0   TTR:  88.4 % (3.2 y)   INR used for dosin.10 (3/10/2021)   Warfarin maintenance plan:  5 mg (5 mg x 1) every Mon, Fri; 2.5 mg (5 mg x 0.5) all other days   Weekly warfarin total:  22.5 mg   Plan last modified:  Bernice Nielson (2019)   Next INR check:  2021   Target end date:  Indefinite    Indications    Stroke (CMS-HCC) [I63.9] (Resolved) [I63.9]             Anticoagulation Episode Summary     INR check location:  Anticoagulation Clinic    Preferred lab:      Send INR reminders to:      Comments:        Anticoagulation Care Providers     Provider Role Specialty Phone number    [facility] Responsible  [phone]    Marshal Sethi M.D. Responsible Neurology [phone]        Anticoagulation Patient Findings  Patient Findings     Negatives:  Signs/symptoms of thrombosis, Signs/symptoms of bleeding, Laboratory test error suspected, Change in health, Change in alcohol use, Change in activity, Upcoming invasive procedure, Emergency department visit, Upcoming dental procedure, Missed doses, Extra doses, Change in medications, Change in diet/appetite, Hospital admission, Bruising, Other complaints              History of Present Illness: follow up appointment for chronic anticoagulation with the high risk medication, warfarin for stroke    Pt remains therapeutic today. Continue current dosing regimen.  Follow up in 4 weeks, to reduce the risk of adverse events related to this high risk medication, warfarin.    Bernice Nielson, Clinical Pharmacist

## 2021-04-07 ENCOUNTER — ANTICOAGULATION VISIT (OUTPATIENT)
Dept: MEDICAL GROUP | Facility: PHYSICIAN GROUP | Age: 66
End: 2021-04-07
Payer: MEDICARE

## 2021-04-07 DIAGNOSIS — Z79.01 CHRONIC ANTICOAGULATION: ICD-10-CM

## 2021-04-07 LAB — INR PPP: 2 (ref 2–3.5)

## 2021-04-07 PROCEDURE — 93793 ANTICOAG MGMT PT WARFARIN: CPT | Performed by: FAMILY MEDICINE

## 2021-04-07 PROCEDURE — 99999 PR NO CHARGE: CPT | Performed by: FAMILY MEDICINE

## 2021-04-07 PROCEDURE — 85610 PROTHROMBIN TIME: CPT | Performed by: FAMILY MEDICINE

## 2021-04-07 NOTE — PROGRESS NOTES
Anticoagulation Summary  As of 2021    INR goal:  2.0-3.0   TTR:  88.7 % (3.3 y)   INR used for dosin.00 (2021)   Warfarin maintenance plan:  5 mg (5 mg x 1) every Mon, Fri; 2.5 mg (5 mg x 0.5) all other days   Weekly warfarin total:  22.5 mg   Plan last modified:  Bernice Nielson (2019)   Next INR check:  2021   Target end date:  Indefinite    Indications    Stroke (CMS-HCC) [I63.9] (Resolved) [I63.9]             Anticoagulation Episode Summary     INR check location:  Anticoagulation Clinic    Preferred lab:      Send INR reminders to:      Comments:        Anticoagulation Care Providers     Provider Role Specialty Phone number    [facility] Responsible  [phone]    Marshal Sethi M.D. Responsible Neurology [phone]        Anticoagulation Patient Findings          History of Present Illness: follow up appointment for chronic anticoagulation with the high risk medication, warfarin for stroke    Pt remains therapeutic today. Continue current dosing regimen.  Follow up in 6 weeks, to reduce the risk of adverse events related to this high risk medication, warfarin.    Bernice Nielson, Clinical Pharmacist

## 2021-04-12 ENCOUNTER — OFFICE VISIT (OUTPATIENT)
Dept: NEUROLOGY | Facility: MEDICAL CENTER | Age: 66
End: 2021-04-12
Attending: NURSE PRACTITIONER
Payer: MEDICARE

## 2021-04-12 VITALS
DIASTOLIC BLOOD PRESSURE: 58 MMHG | HEART RATE: 81 BPM | SYSTOLIC BLOOD PRESSURE: 100 MMHG | WEIGHT: 148.59 LBS | HEIGHT: 67 IN | OXYGEN SATURATION: 95 % | RESPIRATION RATE: 12 BRPM | TEMPERATURE: 98.4 F | BODY MASS INDEX: 23.32 KG/M2

## 2021-04-12 DIAGNOSIS — I69.30 LATE EFFECT OF STROKE: ICD-10-CM

## 2021-04-12 DIAGNOSIS — Z79.01 CHRONIC ANTICOAGULATION: ICD-10-CM

## 2021-04-12 PROBLEM — I10 ESSENTIAL HYPERTENSION: Status: ACTIVE | Noted: 2021-01-04

## 2021-04-12 PROBLEM — N40.0 BENIGN PROSTATIC HYPERPLASIA WITHOUT LOWER URINARY TRACT SYMPTOMS: Status: ACTIVE | Noted: 2021-01-04

## 2021-04-12 PROBLEM — E78.2 MIXED HYPERLIPIDEMIA: Status: ACTIVE | Noted: 2021-01-04

## 2021-04-12 PROBLEM — G47.09 OTHER INSOMNIA: Status: ACTIVE | Noted: 2021-01-04

## 2021-04-12 PROCEDURE — 99205 OFFICE O/P NEW HI 60 MIN: CPT | Performed by: NURSE PRACTITIONER

## 2021-04-12 PROCEDURE — 99212 OFFICE O/P EST SF 10 MIN: CPT | Performed by: NURSE PRACTITIONER

## 2021-04-12 RX ORDER — ATORVASTATIN CALCIUM 20 MG/1
20 TABLET, FILM COATED ORAL
Status: ON HOLD | COMMUNITY
Start: 2021-03-26 | End: 2023-10-30 | Stop reason: SDUPTHER

## 2021-04-12 RX ORDER — CICLOPIROX 80 MG/ML
SOLUTION TOPICAL
Status: ON HOLD | COMMUNITY
Start: 2021-04-01 | End: 2023-10-30

## 2021-04-12 ASSESSMENT — ENCOUNTER SYMPTOMS
FOCAL WEAKNESS: 0
HEADACHES: 0
VOMITING: 0
WEAKNESS: 0
BACK PAIN: 0
PALPITATIONS: 0
FEVER: 0
NAUSEA: 0
NECK PAIN: 0
BRUISES/BLEEDS EASILY: 1
DEPRESSION: 0
DIZZINESS: 1
BLURRED VISION: 1
NERVOUS/ANXIOUS: 0
HEARTBURN: 0
SPEECH CHANGE: 0

## 2021-04-12 ASSESSMENT — PATIENT HEALTH QUESTIONNAIRE - PHQ9: CLINICAL INTERPRETATION OF PHQ2 SCORE: 0

## 2021-04-12 NOTE — PROGRESS NOTES
Subjective:    HPI  Hieu Duong is a 65 y.o.  handed male who presents to [facility] for evaluation of multiple remote strokes.    He was referred by Dr. José Luis Hernández.  PMH includes  IDDM, vision loss, history of stroke, history of GI bleed.    He is on warfarin for Hx of embolic stroke, he was previously seen by Dr. Sethi.   Social History: 1 shot of scotch nightly, quit smoking 10 years (smoked for 40 years 1ppd), uses edible marijuana.     In 2015 he had a stroke with loss of vision on the right side, the loss of vision had been permanent.    He was started on warfarin in 1/2015, he has tolerated coumadin and has not had any stroke symptoms since then.     He  reports that he in late 2014 he had transient loss of vision on the right side.     In 2014 he [place] the [place] with his son -in-law and the next morning he woke up and couldn't speak, he was not seen in the hospital at that time. The symptoms lasted about 10 minutes.     He later saw Dr. Sethi who told him he had 7 strokes.    He had a few episodes of aphasia in the office following the 2014 episode.    In 2014 he was on a blood thinner per Dr. Sethi and had a lower GI bleed.     He had a loop recorder placed and it was in for about 1 year, he states that it was normal.     He reports that the only time he was in the hospital for stroke was in early 2015 at [place].       Review of Systems   Constitutional: Negative for fever.   HENT: Negative for hearing loss.    Eyes: Positive for blurred vision.        Loss of vision   Cardiovascular: Negative for chest pain and palpitations.   Gastrointestinal: Negative for heartburn, nausea and vomiting.   Genitourinary: Negative.    Musculoskeletal: Negative for back pain and neck pain.   Skin: Negative for rash.   Neurological: Positive for dizziness. Negative for speech change, focal weakness, weakness and headaches.   Endo/Heme/Allergies: Bruises/bleeds easily.  "  Psychiatric/Behavioral: Negative for depression. The patient is not nervous/anxious.      Current Outpatient Medications on File Prior to Visit   Medication Sig Dispense Refill   • ciclopirox (PENLAC) 8 % solution APPLY TO AFFECTED TOES AT END OF DAY AND REMOVE WITH NAIL POLISH REMOVER AT END OF WEEK     • Sodium Fluoride (PREVIDENT 5000 BOOSTER DT) Apply  to teeth.     • atorvastatin (LIPITOR) 20 MG Tab Take 20 mg by mouth every day.     • warfarin (COUMADIN) 5 MG Tab Take one-half to one tablet by mouth daily or as directed by anticoagulation clinic 90 Tab 1   • Insulin Degludec (TRESIBA) 100 UNIT/ML Solution Inject 24 Units as instructed.     • omeprazole (PRILOSEC) 20 MG delayed-release capsule Take 20 mg by mouth every day.     • insulin lispro (HUMALOG) 100 UNIT/ML Solution Inject  as instructed 3 times a day before meals.     • enalapril (VASOTEC) 5 MG Tab Take 5 mg by mouth every day.     • Insulin Glargine (TOUJEO SOLOSTAR) 300 UNIT/ML Solution Pen-injector Inject  as instructed.       No current facility-administered medications on file prior to visit.     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        Objective:   Encounter Vitals  Standard Vitals  Vitals  Blood Pressure : 100/58  Temperature: 36.9 °C (98.4 °F)  Temp src: Temporal  Pulse: 81  Respiration: 12  Pulse Oximetry: 95 %  Height: 170.2 cm (5' 7\")  Weight: 67.4 kg (148 lb 9.4 oz)  Encounter Vitals  Temperature: 36.9 °C (98.4 °F)  Temp src: Temporal  Blood Pressure : 100/58  Pulse: 81  Respiration: 12  Pulse Oximetry: 95 %  Weight: 67.4 kg (148 lb 9.4 oz)  Height: 170.2 cm (5' 7\")  BMI (Calculated): 23.27    A1C 6.4 (4/2021)    Physical Exam    Constitutional:  Alert, no apparent distress,  Psych:   mood and affect WNL  Neuro:  Oriented X 4, speech fluent, naming and memory " intact  Muskuloskeletal:  Moves all extremities equally, strength 5/5 bilaterally, no drift  CN II: Inferior/superior rHH.. Fundoscopic exam is normal with sharp discs and no vascular changes. Pupils are 3 mm and briskly reactive to light.   CN III, IV, VI  EOMs intact, no ptosis  CN V: Facial sensation is intact to pinprick in all 3 divisions bilaterally. Corneal responses are intact.  CN VII: Face is symmetric with normal eye closure and smile.  CN VII: Hearing is normal to rubbing fingers  CN IX, X: Palate elevates symmetrically. Phonation is normal.  CN XI: Head turning and shoulder shrug are intact  CN XII: Tongue is midline with normal movements and no atrophy.                           Sensation to PP equal bilaterally                 No limb ataxia with finger to nose and heel to shin                 Ambulates with steady gait.                 Rhomberg negative                Biceps,brachioradialis, tricep, patellar and ankle reflex all 2+     Cardiovascular:    S1S2, no abnormal rhythm auscultated, no peripheral edema  Neck:                     No carotid bruits noted   Pulmonary:            Respirations easy, lungs clear to auscultation all fields.     Skin:                     No obvious rashes.      Iniital NIHSS unknown      Current NIHSS    1a. LOC: 0  1b. LOC Questions: 0  1c. LOC Commands: 0  2. Best Gaze:0  3. Visual Fields: 2  4. Facial Paresis: 0  5a. Motor arm left: 0  5b. Motor arm right: 0  6a. Motor leg left: 0  6b. Motor leg right: 0  7. Sensory: 0  8. Best Language: 0  9. Limb Ataxia: 0  10. Dysarthria: 0  11. Extinction/Inattention: 0    Total Score Current 2         Assessment/Plan:     1. Late effect of stroke  RHH, per patient he had been cleared to drive by ophthalmology.  Started on coumadin per previous neurologist.  Requested records from [facility] and Dr. Sethi.  Patient will have current labs sent to me.  Considering the RHH, expect L occipital infarct which would most likely be  [facility], will need to review imaging of blood vessels.      Presumed Mechanism by Toast:  __  Large Artery Atherosclerosis  __  Small Vessel (lacunar)  __   Cardioembolic  __   Other (Sickle cell, vasculitis, hypercoagulable)  __   Unknown  __xcx   ESUS    Stroke risk factors include Diabetes.     Blood pressure goal less than 130/80, current well below goal in office today      2. Chronic anticoagulation  Continue Warfarin w/ follow up @ anti-coagulation clinic.       LDL goal < 70, current unknown, continue Atorvastatin 20mg.  discussed medication side effects, will need follow up with primary care evaluate liver function and intervals and refill  Exercise at least 30 minutes daily, avoid red meat, fried foods, butter, cheese.   Eat 5-6 servings of vegetables and fruits daily, choose lean white meat without skin (chicken, turkey, white fish)--baked, broiled or grilled.      I spent a total of 61minutes caring for patient,  my time includes counseling, review of systems, HPI and assessment, review of images, labs and testing as above.  I reviewed the hospital records, PMH, social and family history.   I have counseled patient on stroke prevention strategies, stroke symptoms and mimics.  Diet and exercise modifications.  We discussed medication side effects and instructions.       I have provided patient a written personalized stroke prevention plan, it is filed under the media tab under ‘[facility]”.      Follow up in 2-3 months after we receive records.

## 2021-04-12 NOTE — PATIENT INSTRUCTIONS
If any new signs of stroke:  sudden weakness, numbness, speech difficulty (slurring or difficulty finding words), imbalance, incoordination, worse headache of life or vision loss occur, call 911.      Take all medications as prescribed unless instructed by your provider.            Stroke Prevention  Some medical conditions and lifestyle choices can lead to a higher risk for a stroke. You can help to prevent a stroke by making nutrition, lifestyle, and other changes.  What nutrition changes can be made?    · Eat healthy foods.  ? Choose foods that are high in fiber. These include:  § Fresh fruits.  § Fresh vegetables.  § Whole grains.  ? Eat at least 5 or more servings of fruits and vegetables each day. Try to fill half of your plate at each meal with fruits and vegetables.  ? Choose lean protein foods. These include:  § Lowfat (lean) cuts of meat.  § Chicken without skin.  § Fish.  § Tofu.  § Beans.  § Nuts.  ? Eat low-fat dairy products.  ? Avoid foods that:  § Are high in salt (sodium).  § Have saturated fat.  § Have trans fat.  § Have cholesterol.  § Are processed.  § Are premade.  · Follow eating guidelines as told by your doctor. These may include:  ? Reducing how many calories you eat and drink each day.  ? Limiting how much salt you eat or drink each day to 1,500 milligrams (mg).  ? Using only healthy fats for cooking. These include:  § Olive oil.  § Canola oil.  § Sunflower oil.  ? Counting how many carbohydrates you eat and drink each day.  What lifestyle changes can be made?  · Try to stay at a healthy weight. Talk to your doctor about what a good weight is for you.  · Get at least 30 minutes of moderate physical activity at least 5 days a week. This can include:  ? Fast walking.  ? Biking.  ? Swimming.  · Do not use any products that have nicotine or tobacco. This includes cigarettes and e-cigarettes. If you need help quitting, ask your doctor. Avoid being around tobacco smoke in general.  · Limit how  "much alcohol you drink to no more than 1 drink a day for nonpregnant women and 2 drinks a day for men. One drink equals 12 oz of beer, 5 oz of wine, or 1½ oz of hard liquor.  · Do not use drugs.  · Avoid taking birth control pills. Talk to your doctor about the risks of taking birth control pills if:  ? You are over 35 years old.  ? You smoke.  ? You get migraines.  ? You have had a blood clot.  What other changes can be made?  · Manage your cholesterol.  ? It is important to eat a healthy diet.  ? If your cholesterol cannot be managed through your diet, you may also need to take medicines. Take medicines as told by your doctor.  · Manage your diabetes.  ? It is important to eat a healthy diet and to exercise regularly.  ? If your blood sugar cannot be managed through diet and exercise, you may need to take medicines. Take medicines as told by your doctor.  · Control your high blood pressure (hypertension).  ? Try to keep your blood pressure below 130/80. This can help lower your risk of stroke.  ? It is important to eat a healthy diet and to exercise regularly.  ? If your blood pressure cannot be managed through diet and exercise, you may need to take medicines. Take medicines as told by your doctor.  ? Ask your doctor if you should check your blood pressure at home.  ? Have your blood pressure checked every year. Do this even if your blood pressure is normal.  · Talk to your doctor about getting checked for a sleep disorder. Signs of this can include:  ? Snoring a lot.  ? Feeling very tired.  · Take over-the-counter and prescription medicines only as told by your doctor. These may include aspirin or blood thinners (antiplatelets or anticoagulants).  · Make sure that any other medical conditions you have are managed.  Where to find more information  · American Stroke Association: www.strokeassociation.org  · National Stroke Association: www.stroke.org  Get help right away if:  · You have any symptoms of stroke. \"BE " "FAST\" is an easy way to remember the main warning signs:  ? B - Balance. Signs are dizziness, sudden trouble walking, or loss of balance.  ? E - Eyes. Signs are trouble seeing or a sudden change in how you see.  ? F - Face. Signs are sudden weakness or loss of feeling of the face, or the face or eyelid drooping on one side.  ? A - Arms. Signs are weakness or loss of feeling in an arm. This happens suddenly and usually on one side of the body.  ? S - Speech. Signs are sudden trouble speaking, slurred speech, or trouble understanding what people say.  ? T - Time. Time to call emergency services. Write down what time symptoms started.  · You have other signs of stroke, such as:  ? A sudden, very bad headache with no known cause.  ? Feeling sick to your stomach (nausea).  ? Throwing up (vomiting).  ? Jerky movements you cannot control (seizure).  These symptoms may represent a serious problem that is an emergency. Do not wait to see if the symptoms will go away. Get medical help right away. Call your local emergency services (911 in the [place] Do not drive yourself to the hospital.  Summary  · You can prevent a stroke by eating healthy, exercising, not smoking, drinking less alcohol, and treating other health problems, such as diabetes, high blood pressure, or high cholesterol.  · Do not use any products that contain nicotine or tobacco, such as cigarettes and e-cigarettes.  · Get help right away if you have any signs or symptoms of a stroke.  This information is not intended to replace advice given to you by your health care provider. Make sure you discuss any questions you have with your health care provider.  Document Released: 06/18/2013 Document Revised: 02/13/2020 Document Reviewed: 03/21/2018  Elsevier Patient Education © 2020 [employer]    "

## 2021-05-19 ENCOUNTER — ANTICOAGULATION VISIT (OUTPATIENT)
Dept: MEDICAL GROUP | Facility: PHYSICIAN GROUP | Age: 66
End: 2021-05-19
Payer: MEDICARE

## 2021-05-19 DIAGNOSIS — Z79.01 CHRONIC ANTICOAGULATION: ICD-10-CM

## 2021-05-19 LAB — INR PPP: 2 (ref 2–3.5)

## 2021-05-19 PROCEDURE — 99999 PR NO CHARGE: CPT | Performed by: FAMILY MEDICINE

## 2021-05-19 PROCEDURE — 85610 PROTHROMBIN TIME: CPT | Performed by: FAMILY MEDICINE

## 2021-05-19 PROCEDURE — 93793 ANTICOAG MGMT PT WARFARIN: CPT | Performed by: FAMILY MEDICINE

## 2021-05-19 NOTE — PROGRESS NOTES
Anticoagulation Summary  As of 2021    INR goal:  2.0-3.0   TTR:  89.0 % (3.4 y)   INR used for dosin.00 (2021)   Warfarin maintenance plan:  5 mg (5 mg x 1) every Mon, Fri; 2.5 mg (5 mg x 0.5) all other days   Weekly warfarin total:  22.5 mg   Plan last modified:  Bernice Nielson (2019)   Next INR check:  2021   Target end date:  Indefinite    Indications    Stroke (CMS-HCC) [I63.9] (Resolved) [I63.9]             Anticoagulation Episode Summary     INR check location:  Anticoagulation Clinic    Preferred lab:      Send INR reminders to:      Comments:        Anticoagulation Care Providers     Provider Role Specialty Phone number    [facility] Responsible  [phone]    Marshal Sethi M.D. Responsible Neurology [phone]        Anticoagulation Patient Findings  Patient Findings     Negatives:  Signs/symptoms of thrombosis, Signs/symptoms of bleeding, Laboratory test error suspected, Change in health, Change in alcohol use, Change in activity, Upcoming invasive procedure, Emergency department visit, Upcoming dental procedure, Missed doses, Extra doses, Change in medications, Change in diet/appetite, Hospital admission, Bruising, Other complaints              History of Present Illness: follow up appointment for chronic anticoagulation with the high risk medication, warfarin for stroke    Pt remains therapeutic today.Continue current dosing regimen.  Follow up in 12 weeks, to reduce the risk of adverse events related to this high risk medication, warfarin.    Bernice Nielson, Clinical Pharmacist

## 2021-07-12 ENCOUNTER — OFFICE VISIT (OUTPATIENT)
Dept: NEUROLOGY | Facility: MEDICAL CENTER | Age: 66
End: 2021-07-12
Attending: NURSE PRACTITIONER
Payer: MEDICARE

## 2021-07-12 VITALS
HEIGHT: 67 IN | BODY MASS INDEX: 23.01 KG/M2 | RESPIRATION RATE: 12 BRPM | DIASTOLIC BLOOD PRESSURE: 60 MMHG | OXYGEN SATURATION: 97 % | SYSTOLIC BLOOD PRESSURE: 110 MMHG | WEIGHT: 146.61 LBS | HEART RATE: 64 BPM | TEMPERATURE: 98 F

## 2021-07-12 DIAGNOSIS — Z79.01 CHRONIC ANTICOAGULATION: ICD-10-CM

## 2021-07-12 DIAGNOSIS — E10.9 TYPE 1 DIABETES MELLITUS WITHOUT COMPLICATION (HCC): ICD-10-CM

## 2021-07-12 DIAGNOSIS — I69.30 LATE EFFECT OF STROKE: ICD-10-CM

## 2021-07-12 PROCEDURE — 99215 OFFICE O/P EST HI 40 MIN: CPT | Performed by: NURSE PRACTITIONER

## 2021-07-12 PROCEDURE — 99212 OFFICE O/P EST SF 10 MIN: CPT | Performed by: NURSE PRACTITIONER

## 2021-07-12 RX ORDER — GLUCAGON INJECTION, SOLUTION 1 MG/.2ML
1 INJECTION, SOLUTION SUBCUTANEOUS
COMMUNITY
Start: 2021-07-07

## 2021-07-12 ASSESSMENT — ENCOUNTER SYMPTOMS
NERVOUS/ANXIOUS: 0
BACK PAIN: 0
PALPITATIONS: 0
BLURRED VISION: 1
HEADACHES: 0
DEPRESSION: 0
FEVER: 0
SPEECH CHANGE: 0
COUGH: 0
BRUISES/BLEEDS EASILY: 1
WEAKNESS: 0
TINGLING: 0
FOCAL WEAKNESS: 0
HEARTBURN: 0
NECK PAIN: 0

## 2021-07-12 NOTE — PROGRESS NOTES
Subjective:      Hieu Duong is a 66 y.o.  handed male who presents for follow up for evaluation of multiple remote strokes.  I last saw him on 4/12/2021.      He was referred by Dr. José Luis Hernández.  PMH includes  IDDM, vision loss, history of stroke, history of GI bleed.     He is on warfarin for Hx of embolic stroke, he was previously seen by Dr. Sethi.   Social History: 1 shot of scotch nightly, quit smoking 10 years (smoked for 40 years 1ppd), uses edible marijuana.      In 2014 he had a stroke with loss of vision on the right side, the loss of vision had been permanent.     He was started on warfarin in 1/2015, he has tolerated coumadin and has not had any stroke symptoms since then.      He  reports that he in late 2014 he had transient loss of vision on the right side.      In 2014 [place] his son -in-law and the next morning he woke up and couldn't speak, he was not seen in the hospital at that time. The symptoms lasted about 10 minutes.      He later saw Dr. Sethi who told him he had 7 strokes.     He had a few episodes of aphasia in the office following the 2014 episode.     In 2014 he was on a blood thinner per Dr. Sethi and had a lower GI bleed.      He had a loop recorder placed and it was in for about 18 month,, he states that it was normal.      He reports that the only time he was in the hospital for stroke was in early 2015 at [place].      He is here alone today, no new symptoms.  I have received the records from [place] since last visit.   He continues to follow with endocrinology and is doing very well.  He remains on warfarin, last INR 2.0.       Review of Systems   Constitutional: Negative for fever.   HENT: Negative for hearing loss.    Eyes: Positive for blurred vision.   Respiratory: Negative for cough.    Cardiovascular: Negative for chest pain and palpitations.   Gastrointestinal: Negative for heartburn.   Musculoskeletal: Negative for back pain and  "neck pain.   Skin: Negative for rash.   Neurological: Negative for tingling, speech change, focal weakness, weakness and headaches.   Endo/Heme/Allergies: Bruises/bleeds easily.   Psychiatric/Behavioral: Negative for depression. The patient is not nervous/anxious.      Current Outpatient Medications on File Prior to Visit   Medication Sig Dispense Refill   • Glucagon (GVOKE PFS) 1 MG/0.2ML Solution Prefilled Syringe Inject 1 Each under the skin.     • ciclopirox (PENLAC) 8 % solution APPLY TO AFFECTED TOES AT END OF DAY AND REMOVE WITH NAIL POLISH REMOVER AT END OF WEEK     • Sodium Fluoride (PREVIDENT 5000 BOOSTER DT) Apply  to teeth.     • atorvastatin (LIPITOR) 20 MG Tab Take 20 mg by mouth every day.     • warfarin (COUMADIN) 5 MG Tab Take one-half to one tablet by mouth daily or as directed by anticoagulation clinic 90 Tab 1   • Insulin Degludec (TRESIBA) 100 UNIT/ML Solution Inject 24 Units as instructed.     • omeprazole (PRILOSEC) 20 MG delayed-release capsule Take 20 mg by mouth every day.     • insulin lispro (HUMALOG) 100 UNIT/ML Solution Inject  as instructed 3 times a day before meals.     • enalapril (VASOTEC) 5 MG Tab Take 5 mg by mouth every day.       No current facility-administered medications on file prior to visit.     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      Objective:     /60 (BP Location: Right arm, Patient Position: Sitting, BP Cuff Size: Adult)   Pulse 64   Temp 36.7 °C (98 °F) (Temporal)   Resp 12   Ht 1.702 m (5' 7\")   Wt 66.5 kg (146 lb 9.7 oz)   SpO2 97%   BMI 22.96 kg/m²      MRI [facility]'s 2014:  Old left parietal, frontal and occipital infarcts.     Review of [facility] records from 2014:   CTA of neck/carotid US WNL,  CTA head w/fetal PCA, otherwise normal.     [facility] 5/13/2021:  LDl 54, A1C 6.6, " Creat 0.73.    PHYSICAL ASSESSMENT  Constitutional:  Alert, no apparent distress,  Psych:   mood and affect WNL  Muskuloskeletal:  Moves all extremities equally, strength 5/5 bilaterally, no drift  NEUROLOGICAL ASSESSMENT  Oriented X 4, speech fluent, naming and memory intact  CN II: superior/inferior RHH, Fundoscopic exam is normal with sharp discs and no vascular changes. Pupils are 3mm and briskly reactive to light.   CN III, IV, VI  EOMs intact, no ptosis  CN V: Facial sensation is intact to pinprick in all 3 divisions bilaterally. Corneal responses are intact.  CN VII: Face is symmetric with normal eye closure and smile.  CN VII: Hearing is normal to rubbing fingers  CN IX, X: Palate elevates symmetrically. Phonation is normal.  CN XI: Head turning and shoulder shrug are intact  CN XII: Tongue is midline with normal movements and no atrophy.                           Sensation to PP equal bilaterally                 No limb ataxia with finger to nose and heel to shin                 Ambulates with steady gait.                 Rhomberg negative                Biceps,brachioradialis, tricep, patellar and ankle reflex all 2+     Cardiovascular:    S1S2, no abnormal rhythm auscultated, no peripheral edema  Neck:                     No carotid bruits noted   Pulmonary:            Respirations easy, lungs clear to auscultation all fields.     Skin:                     No obvious rashes.      Current NIHSS    1a. LOC: 0  1b. LOC Questions: 0  1c. LOC Commands: 0  2. Best Gaze:0  3. Visual Fields: 2  4. Facial Paresis: 0  5a. Motor arm left: 0  5b. Motor arm right: 0  6a. Motor leg left: 0  6b. Motor leg right: 0  7. Sensory: 0  8. Best Language: 0  9. Limb Ataxia: 0  10. Dysarthria: 0  11. Extinction/Inattention: 0    Total Score Current 2        Current mRS 1.             Assessment/Plan:     1. Late effect of stroke  RHH, per patient he had been cleared to drive by ophthalmology.  Started on coumadin per previous  neurologist.  Likely cardioembolic given multiple infarcts, blood vessel imaging did not reveal any significant stenosis.  HE had a loop recorder for about 18 months, it was negative for atrial fibrillation.     Regardless, warfarin is likely the best prophylaxis against future strokes.      Presumed Mechanism by Toast:  __  Large Artery Atherosclerosis  __  Small Vessel (lacunar)  __   Cardioembolic  __   Other (Sickle cell, vasculitis, hypercoagulable)  __   Unknown  __xcx   ESUS     Stroke risk factors include Diabetes.      Blood pressure goal less than 130/80, at goal today.    LDL goal less than 70, current 54 , continue Atorvastatin 20mg,     Exercise at least 30 minutes daily, avoid red meat, fried foods, butter, cheese.   Eat 5-6 servings of vegetables and fruits daily, choose lean white meat without skin (chicken, turkey, white fish)--baked, broiled or grilled.        2. Chronic anticoagulationContinue Warfarin w/ follow up @ anti-coagulation clinic.           3. Type 1 diabetes mellitus without complication (HCC)     A1C 6.6 (goal less than 7.0),      Continue follow up with endocrinology.        I spent a total of 40 minutes providing care to patient, my time includes counseling, review of systems, HPI and assessment, review of images, labs and testing as above.  I reviewed the hospital records, PMH, social and family history.   I have counseled patient on stroke prevention strategies, stroke symptoms and mimics.  Diet and exercise modifications.  We discussed medication side effects and instructions.

## 2021-08-11 ENCOUNTER — ANTICOAGULATION VISIT (OUTPATIENT)
Dept: CARDIOLOGY | Facility: PHYSICIAN GROUP | Age: 66
End: 2021-08-11
Payer: MEDICARE

## 2021-08-11 DIAGNOSIS — Z79.01 CHRONIC ANTICOAGULATION: ICD-10-CM

## 2021-08-11 LAB — INR PPP: 2.4 (ref 2–3.5)

## 2021-08-11 PROCEDURE — 93793 ANTICOAG MGMT PT WARFARIN: CPT | Performed by: NURSE PRACTITIONER

## 2021-08-11 PROCEDURE — 99999 PR NO CHARGE: CPT | Performed by: FAMILY MEDICINE

## 2021-08-11 PROCEDURE — 85610 PROTHROMBIN TIME: CPT | Mod: QW | Performed by: NURSE PRACTITIONER

## 2021-08-11 NOTE — PROGRESS NOTES
Anticoagulation Summary  As of 2021    INR goal:  2.0-3.0   TTR:  89.7 % (3.6 y)   INR used for dosin.40 (2021)   Warfarin maintenance plan:  5 mg (5 mg x 1) every Mon, Fri; 2.5 mg (5 mg x 0.5) all other days   Weekly warfarin total:  22.5 mg   Plan last modified:  Bernice Nielson (2019)   Next INR check:  11/3/2021   Target end date:  Indefinite    Indications    Stroke (CMS-HCC) [I63.9] (Resolved) [I63.9]             Anticoagulation Episode Summary     INR check location:  Anticoagulation Clinic    Preferred lab:      Send INR reminders to:      Comments:        Anticoagulation Care Providers     Provider Role Specialty Phone number    [facility] Responsible  [phone]    Marshal Sethi M.D. Responsible Neurology [phone]        Anticoagulation Patient Findings  Patient Findings     Negatives:  Signs/symptoms of thrombosis, Signs/symptoms of bleeding, Laboratory test error suspected, Change in health, Change in alcohol use, Change in activity, Upcoming invasive procedure, Emergency department visit, Upcoming dental procedure, Missed doses, Extra doses, Change in medications, Change in diet/appetite, Hospital admission, Bruising, Other complaints            History of Present Illness: follow up appointment for chronic anticoagulation with the high risk medication, warfarin for stroke    Pt remains therapeutic today. Continue current dosing regimen.  Follow up in 12 weeks, to reduce the risk of adverse events related to this high risk medication, warfarin.    Bernice Nielson, Clinical Pharmacist

## 2021-09-22 ENCOUNTER — ANTICOAGULATION VISIT (OUTPATIENT)
Dept: MEDICAL GROUP | Facility: PHYSICIAN GROUP | Age: 66
End: 2021-09-22

## 2021-09-22 DIAGNOSIS — Z23 FLU VACCINE NEED: ICD-10-CM

## 2021-09-22 DIAGNOSIS — Z79.01 CHRONIC ANTICOAGULATION: ICD-10-CM

## 2021-09-22 LAB — INR PPP: 2 (ref 2–3.5)

## 2021-09-22 PROCEDURE — G0008 ADMIN INFLUENZA VIRUS VAC: HCPCS | Performed by: FAMILY MEDICINE

## 2021-09-22 PROCEDURE — 93793 ANTICOAG MGMT PT WARFARIN: CPT | Performed by: FAMILY MEDICINE

## 2021-09-22 PROCEDURE — 90662 IIV NO PRSV INCREASED AG IM: CPT | Performed by: FAMILY MEDICINE

## 2021-09-22 PROCEDURE — 85610 PROTHROMBIN TIME: CPT | Performed by: FAMILY MEDICINE

## 2021-09-22 PROCEDURE — 99999 PR NO CHARGE: CPT | Performed by: FAMILY MEDICINE

## 2021-09-22 NOTE — PROGRESS NOTES
Anticoagulation Summary  As of 2021    INR goal:  2.0-3.0   TTR:  90.0 % (3.8 y)   INR used for dosin.00 (2021)   Warfarin maintenance plan:  5 mg (5 mg x 1) every Mon, Fri; 2.5 mg (5 mg x 0.5) all other days   Weekly warfarin total:  22.5 mg   Plan last modified:  Bernice Nielson (2019)   Next INR check:     Target end date:  Indefinite    Indications    Stroke (CMS-HCC) [I63.9] (Resolved) [I63.9]             Anticoagulation Episode Summary     INR check location:  Anticoagulation Clinic    Preferred lab:      Send INR reminders to:      Comments:        Anticoagulation Care Providers     Provider Role Specialty Phone number    [facility] Responsible  [phone]    Marshal Sethi M.D. Responsible Neurology [phone]        Anticoagulation Patient Findings  Patient Findings     Negatives:  Signs/symptoms of thrombosis, Signs/symptoms of bleeding, Laboratory test error suspected, Change in health, Change in alcohol use, Change in activity, Upcoming invasive procedure, Emergency department visit, Upcoming dental procedure, Missed doses, Extra doses, Change in medications, Change in diet/appetite, Hospital admission, Bruising, Other complaints              History of Present Illness: follow up appointment for chronic anticoagulation with the high risk medication, warfarin for Stroke    Medications reconciled y  Pt is not on antiplatelet therapy       INR remains therapeutic.  Pt is scheduled for two teeth removal on 2021.  Typically this does not require interruption of anticoagulation therapy.  Will contact Dr. Cano [phone]      Pt may require bridging depending on what the dentist will actually anticipate doing.    Bernice Nielson, Clinical Pharmacist, CDE, CACP    Pt requested, consented to and received HD flu vaccine    ADDENDUM:  Spoke with Dr. Cano's office.   Believes this will be a difficult extraction as both teeth are impacted.    Will bridge as outlined  below:  September 24 - Last day of warfarin  September 25- No warfarin Lovenox 100mg sub q at dinner time  September 26- No warfarin Lovenox 100mg sub q at dinner time  September 27- No warfarin Lovenox 100mg sub q at dinner time  September 28 - no blood thinners  September 29- NO blood thinners  September 30- Warfarin 2.5 AND Lovenox 100mg sub q at dinnertime  October 1- Warfarin 5mg AND Lovenox 100mg sub q at dinner time  October 2- Warfarin 2.5 AND Lovenox 100mg sub q at dinnertime  October 3-Warfarin 2.5 AND Lovenox 100mg sub q at dinnertime  October 4- Warfarin 5mg AND Lovenox 100mg sub q at dinner time  October 6 tEST INR     Bridging instructions sent via Jovita Nielson, Clinical Pharmacist, CDE, CACP

## 2021-09-24 ENCOUNTER — DOCUMENTATION (OUTPATIENT)
Dept: VASCULAR LAB | Facility: MEDICAL CENTER | Age: 66
End: 2021-09-24

## 2021-09-24 DIAGNOSIS — Z86.73 HISTORY OF CVA (CEREBROVASCULAR ACCIDENT): ICD-10-CM

## 2021-10-06 ENCOUNTER — ANTICOAGULATION VISIT (OUTPATIENT)
Dept: MEDICAL GROUP | Facility: PHYSICIAN GROUP | Age: 66
End: 2021-10-06
Payer: MEDICARE

## 2021-10-06 DIAGNOSIS — Z79.01 CHRONIC ANTICOAGULATION: ICD-10-CM

## 2021-10-06 LAB — INR PPP: 1.4 (ref 2–3.5)

## 2021-10-06 PROCEDURE — 85610 PROTHROMBIN TIME: CPT | Performed by: FAMILY MEDICINE

## 2021-10-06 PROCEDURE — 93793 ANTICOAG MGMT PT WARFARIN: CPT | Performed by: FAMILY MEDICINE

## 2021-10-06 NOTE — PROGRESS NOTES
Anticoagulation Summary  As of 10/6/2021    INR goal:  2.0-3.0   TTR:  89.1 % (3.8 y)   INR used for dosin.40 (10/6/2021)   Warfarin maintenance plan:  5 mg (5 mg x 1) every Mon, Fri; 2.5 mg (5 mg x 0.5) all other days   Weekly warfarin total:  22.5 mg   Plan last modified:  Jeremy Menendez PharmD (10/6/2021)   Next INR check:     Target end date:  Indefinite    Indications    Stroke (CMS-HCC) [I63.9] (Resolved) [I63.9]             Anticoagulation Episode Summary     INR check location:  Anticoagulation Clinic    Preferred lab:      Send INR reminders to:      Comments:        Anticoagulation Care Providers     Provider Role Specialty Phone number    [facility] Responsible  [phone]    Marshal Sethi M.D. Responsible Neurology [phone]                Refer to Patient Findings for HPI:  Patient Findings     Negatives:  Signs/symptoms of thrombosis, Signs/symptoms of bleeding, Laboratory test error suspected, Change in health, Change in alcohol use, Change in activity, Upcoming invasive procedure, Emergency department visit, Upcoming dental procedure, Missed doses, Extra doses, Change in medications, Change in diet/appetite, Hospital admission, Bruising, Other complaints          There were no vitals filed for this visit.  pt declined vitals    Verified current warfarin dosing schedule.    Medications reconciled Yes  Pt is not on antiplatelet therapy      A/P   INR  sub-therapeutic.     Warfarin dosing recommendation: Will bolus with a total of 5 mg of warfarin 10/6, and 5mg on 10/9 in addition to normal regimen. Continue bridging with lovenox 100mg subQ once daily.    Pt educated to contact our clinic with any changes in medications or s/s of bleeding or thrombosis. Pt is aware to seek immediate medical attention for falls, head injury or deep cuts.    Follow up appointment in 1 week.    Jeremy Menendez, MaryD

## 2021-10-13 ENCOUNTER — ANTICOAGULATION VISIT (OUTPATIENT)
Dept: MEDICAL GROUP | Facility: PHYSICIAN GROUP | Age: 66
End: 2021-10-13
Payer: MEDICARE

## 2021-10-13 DIAGNOSIS — Z79.01 CHRONIC ANTICOAGULATION: ICD-10-CM

## 2021-10-13 LAB — INR PPP: 2 (ref 2–3.5)

## 2021-10-13 PROCEDURE — 85610 PROTHROMBIN TIME: CPT | Performed by: FAMILY MEDICINE

## 2021-10-13 PROCEDURE — 99999 PR NO CHARGE: CPT | Performed by: FAMILY MEDICINE

## 2021-10-13 NOTE — PROGRESS NOTES
Anticoagulation Summary  As of 10/13/2021    INR goal:  2.0-3.0   TTR:  88.7 % (3.8 y)   INR used for dosin.00 (10/13/2021)   Warfarin maintenance plan:  5 mg (5 mg x 1) every Mon, Fri; 2.5 mg (5 mg x 0.5) all other days   Weekly warfarin total:  22.5 mg   Plan last modified:  Jeremy Menendez, PharmD (10/6/2021)   Next INR check:  11/3/2021   Target end date:  Indefinite    Indications    Stroke (CMS-HCC) [I63.9] (Resolved) [I63.9]             Anticoagulation Episode Summary     INR check location:  Anticoagulation Clinic    Preferred lab:      Send INR reminders to:      Comments:        Anticoagulation Care Providers     Provider Role Specialty Phone number    [facility] Responsible  [phone]    Marshal Sethi M.D. Responsible Neurology [phone]        Anticoagulation Patient Findings  Patient Findings     Negatives:  Signs/symptoms of thrombosis, Signs/symptoms of bleeding, Laboratory test error suspected, Change in health, Change in alcohol use, Change in activity, Upcoming invasive procedure, Emergency department visit, Upcoming dental procedure, Missed doses, Extra doses, Change in medications, Change in diet/appetite, Hospital admission, Bruising, Other complaints              History of Present Illness: follow up appointment for chronic anticoagulation with the high risk medication, warfarin for stroke    Medications reconciled yes   Pt is not on antiplatelet therapy    Last INR was out of range, dosage adjusted: Pt is back at goal.  OK to STOP LOVENOX. Continue current dosing regimen.  Follow up in 3 weeks, to reduce the risk of adverse events related to this high risk medication, warfarin.    Bernice Nielson, Clinical Pharmacist

## 2021-11-03 ENCOUNTER — ANTICOAGULATION VISIT (OUTPATIENT)
Dept: MEDICAL GROUP | Facility: PHYSICIAN GROUP | Age: 66
End: 2021-11-03
Payer: MEDICARE

## 2021-11-03 DIAGNOSIS — Z79.01 CHRONIC ANTICOAGULATION: ICD-10-CM

## 2021-11-03 LAB — INR PPP: 1.8 (ref 2–3.5)

## 2021-11-03 PROCEDURE — 99211 OFF/OP EST MAY X REQ PHY/QHP: CPT | Performed by: FAMILY MEDICINE

## 2021-11-03 PROCEDURE — 85610 PROTHROMBIN TIME: CPT | Performed by: FAMILY MEDICINE

## 2021-11-03 NOTE — PROGRESS NOTES
Anticoagulation Summary  As of 11/3/2021    INR goal:  2.0-3.0   TTR:  87.4 % (3.9 y)   INR used for dosin.80 (11/3/2021)   Warfarin maintenance plan:  5 mg (5 mg x 1) every Mon, Wed, Fri; 2.5 mg (5 mg x 0.5) all other days   Weekly warfarin total:  25 mg   Plan last modified:  Bernice Nielson (11/3/2021)   Next INR check:  2021   Target end date:  Indefinite    Indications    Stroke (CMS-HCC) [I63.9] (Resolved) [I63.9]             Anticoagulation Episode Summary     INR check location:  Anticoagulation Clinic    Preferred lab:      Send INR reminders to:      Comments:        Anticoagulation Care Providers     Provider Role Specialty Phone number    [facility] Responsible  [phone]    Marshal Sethi M.D. Responsible Neurology [phone]        Anticoagulation Patient Findings  Patient Findings     Negatives:  Signs/symptoms of thrombosis, Signs/symptoms of bleeding, Laboratory test error suspected, Change in health, Change in alcohol use, Change in activity, Upcoming invasive procedure, Emergency department visit, Upcoming dental procedure, Missed doses, Extra doses, Change in medications, Change in diet/appetite, Hospital admission, Bruising, Other complaints              History of Present Illness: follow up appointment for chronic anticoagulation with the high risk medication, warfarin for stroke    Medications reconciled yes  Pt is not on antiplatelet therapy      INRs have been labile. Will increase weekly dose to 25mg/week. Follow up in 2 weeks, to reduce the risk of adverse events related to this high risk medication, warfarin.    Bernice Nielson, Clinical Pharmacist

## 2021-11-17 ENCOUNTER — ANTICOAGULATION VISIT (OUTPATIENT)
Dept: MEDICAL GROUP | Facility: PHYSICIAN GROUP | Age: 66
End: 2021-11-17
Payer: MEDICARE

## 2021-11-17 DIAGNOSIS — Z79.01 CHRONIC ANTICOAGULATION: ICD-10-CM

## 2021-11-17 LAB — INR PPP: 2.3 (ref 2–3.5)

## 2021-11-17 PROCEDURE — 93793 ANTICOAG MGMT PT WARFARIN: CPT | Performed by: FAMILY MEDICINE

## 2021-11-17 PROCEDURE — 85610 PROTHROMBIN TIME: CPT | Performed by: FAMILY MEDICINE

## 2021-11-17 PROCEDURE — 99999 PR NO CHARGE: CPT | Performed by: FAMILY MEDICINE

## 2021-11-17 NOTE — PROGRESS NOTES
Anticoagulation Summary  As of 2021    INR goal:  2.0-3.0   TTR:  87.1 % (3.9 y)   INR used for dosin.30 (2021)   Warfarin maintenance plan:  5 mg (5 mg x 1) every Mon, Wed, Fri; 2.5 mg (5 mg x 0.5) all other days   Weekly warfarin total:  25 mg   Plan last modified:  Bernice Nielson (11/3/2021)   Next INR check:  2021   Target end date:  Indefinite    Indications    Stroke (CMS-HCC) [I63.9] (Resolved) [I63.9]             Anticoagulation Episode Summary     INR check location:  Anticoagulation Clinic    Preferred lab:      Send INR reminders to:      Comments:        Anticoagulation Care Providers     Provider Role Specialty Phone number    [facility] Responsible  [phone]    Marshal Sethi M.D. Responsible Neurology [phone]        Anticoagulation Patient Findings  Patient Findings     Negatives:  Signs/symptoms of thrombosis, Signs/symptoms of bleeding, Laboratory test error suspected, Change in health, Change in alcohol use, Change in activity, Upcoming invasive procedure, Emergency department visit, Upcoming dental procedure, Missed doses, Extra doses, Change in medications, Change in diet/appetite, Hospital admission, Bruising, Other complaints              History of Present Illness: follow up appointment for chronic anticoagulation with the high risk medication, warfarin for STROKE    Medications reconciled yes   Pt is not on antiplatelet therapy    Pt remains therapeutic today. Continue current dosing regimen.  Follow up in 6 weeks, to reduce the risk of adverse events related to this high risk medication, warfarin.    Bernice Nielson, Clinical Pharmacist

## 2021-12-29 ENCOUNTER — ANTICOAGULATION VISIT (OUTPATIENT)
Dept: MEDICAL GROUP | Facility: PHYSICIAN GROUP | Age: 66
End: 2021-12-29
Payer: MEDICARE

## 2021-12-29 DIAGNOSIS — Z79.01 CHRONIC ANTICOAGULATION: ICD-10-CM

## 2021-12-29 LAB — INR PPP: 2 (ref 2–3.5)

## 2021-12-29 PROCEDURE — 85610 PROTHROMBIN TIME: CPT | Performed by: STUDENT IN AN ORGANIZED HEALTH CARE EDUCATION/TRAINING PROGRAM

## 2021-12-29 PROCEDURE — 93793 ANTICOAG MGMT PT WARFARIN: CPT | Performed by: STUDENT IN AN ORGANIZED HEALTH CARE EDUCATION/TRAINING PROGRAM

## 2021-12-29 NOTE — PROGRESS NOTES
Anticoagulation Summary  As of 2021    INR goal:  2.0-3.0   TTR:  87.5 % (4 y)   INR used for dosin.00 (2021)   Warfarin maintenance plan:  5 mg (5 mg x 1) every Mon, Wed, Fri; 2.5 mg (5 mg x 0.5) all other days   Weekly warfarin total:  25 mg   Plan last modified:  Bernice Nielson (11/3/2021)   Next INR check:  2022   Target end date:  Indefinite    Indications    Stroke (CMS-HCC) [I63.9] (Resolved) [I63.9]             Anticoagulation Episode Summary     INR check location:  Anticoagulation Clinic    Preferred lab:      Send INR reminders to:      Comments:        Anticoagulation Care Providers     Provider Role Specialty Phone number    [facility] Responsible  [phone]    Marshal Sethi M.D. Responsible Neurology [phone]        Anticoagulation Patient Findings          History of Present Illness: follow up appointment for chronic anticoagulation with the high risk medication, warfarin for stroke    Medications reconciled yes   Pt is not on antiplatelet therapy    Pt remains therapeutic today. Continue current dosing regimen.  Follow up in 8 weeks, to reduce the risk of adverse events related to this high risk medication, warfarin.    Bernice Nielson, Clinical Pharmacist

## 2022-01-26 DIAGNOSIS — Z86.73 HISTORY OF CVA (CEREBROVASCULAR ACCIDENT): ICD-10-CM

## 2022-01-26 RX ORDER — WARFARIN SODIUM 5 MG/1
TABLET ORAL
Qty: 90 TABLET | Refills: 1 | Status: SHIPPED | OUTPATIENT
Start: 2022-01-26 | End: 2022-08-01

## 2022-02-23 ENCOUNTER — ANTICOAGULATION VISIT (OUTPATIENT)
Dept: MEDICAL GROUP | Facility: PHYSICIAN GROUP | Age: 67
End: 2022-02-23
Payer: MEDICARE

## 2022-02-23 DIAGNOSIS — Z79.01 CHRONIC ANTICOAGULATION: ICD-10-CM

## 2022-02-23 LAB — INR PPP: 2.3 (ref 2–3.5)

## 2022-02-23 PROCEDURE — 85610 PROTHROMBIN TIME: CPT | Performed by: STUDENT IN AN ORGANIZED HEALTH CARE EDUCATION/TRAINING PROGRAM

## 2022-02-23 PROCEDURE — 93793 ANTICOAG MGMT PT WARFARIN: CPT

## 2022-02-23 PROCEDURE — 99999 PR NO CHARGE: CPT | Performed by: FAMILY MEDICINE

## 2022-02-23 NOTE — PROGRESS NOTES
Anticoagulation Summary  As of 2022    INR goal:  2.0-3.0   TTR:  87.9 % (4.2 y)   INR used for dosin.30 (2022)   Warfarin maintenance plan:  5 mg (5 mg x 1) every Mon, Wed, Fri; 2.5 mg (5 mg x 0.5) all other days   Weekly warfarin total:  25 mg   Plan last modified:  Bernice Nielson (11/3/2021)   Next INR check:  2022   Target end date:  Indefinite    Indications    Stroke (CMS-HCC) [I63.9] (Resolved) [I63.9]             Anticoagulation Episode Summary     INR check location:  Anticoagulation Clinic    Preferred lab:      Send INR reminders to:      Comments:        Anticoagulation Care Providers     Provider Role Specialty Phone number    [facility] Responsible  [phone]    Marshal Sethi M.D. Responsible Neurology [phone]        Anticoagulation Patient Findings  Patient Findings     Negatives:  Signs/symptoms of thrombosis, Signs/symptoms of bleeding, Laboratory test error suspected, Change in health, Change in alcohol use, Change in activity, Upcoming invasive procedure, Emergency department visit, Upcoming dental procedure, Missed doses, Extra doses, Change in medications, Change in diet/appetite, Hospital admission, Bruising, Other complaints              History of Present Illness: follow up appointment for chronic anticoagulation with the high risk medication, warfarin for stroke    Medications reconciled yes  Pt is not on antiplatelet therapy    Pt remains therapeutic today. Continue current dosing regimen.  Follow up in 12 weeks, to reduce the risk of adverse events related to this high risk medication, warfarin.    Bernice Nielson, Clinical Pharmacist

## 2022-03-01 ENCOUNTER — PATIENT MESSAGE (OUTPATIENT)
Dept: CARDIOLOGY | Facility: MEDICAL CENTER | Age: 67
End: 2022-03-01
Payer: COMMERCIAL

## 2022-03-01 ENCOUNTER — TELEPHONE (OUTPATIENT)
Dept: CARDIOLOGY | Facility: MEDICAL CENTER | Age: 67
End: 2022-03-01
Payer: COMMERCIAL

## 2022-03-01 NOTE — TELEPHONE ENCOUNTER
Pt scheduled for 3 dental implants 3/8/2022 and will require lovenox bridging as outlined below  March 2- last dose of warfarin  March 3 - no warfarin  March 4- no warfarin lovenox 100mg sub q at dinner time  March 5-no warfarin lovenox 100mg sub q at dinner time  March 6- no warfarin lovenox 100mg sub q at dinner time  March 7- NO BLOOD THINNER  March 8- procedure  March 9- Lovenox AND warfarin 5mg at dinner time  March 10- Lovenox AND warfarin 5mg at dinner time  March 11- Lovenox AND warfarin 5mg at dinner time  March 12- Lovenox AND warfarin 5mg at dinner time  March 13- Lovenox AND warfarin 5mg at dinner time  March 16 check INR       Bernice Nielson, Clinical Pharmacist, CDE, CACP

## 2022-03-10 ENCOUNTER — TELEPHONE (OUTPATIENT)
Dept: VASCULAR LAB | Facility: MEDICAL CENTER | Age: 67
End: 2022-03-10
Payer: COMMERCIAL

## 2022-03-10 DIAGNOSIS — Z79.01 CHRONIC ANTICOAGULATION: ICD-10-CM

## 2022-03-16 ENCOUNTER — ANTICOAGULATION VISIT (OUTPATIENT)
Dept: MEDICAL GROUP | Facility: PHYSICIAN GROUP | Age: 67
End: 2022-03-16
Payer: MEDICARE

## 2022-03-16 DIAGNOSIS — Z79.01 CHRONIC ANTICOAGULATION: ICD-10-CM

## 2022-03-16 LAB — INR PPP: 1.9 (ref 2–3.5)

## 2022-03-16 PROCEDURE — 85610 PROTHROMBIN TIME: CPT | Performed by: STUDENT IN AN ORGANIZED HEALTH CARE EDUCATION/TRAINING PROGRAM

## 2022-03-16 PROCEDURE — 93793 ANTICOAG MGMT PT WARFARIN: CPT | Performed by: FAMILY MEDICINE

## 2022-03-16 NOTE — PROGRESS NOTES
Anticoagulation Summary  As of 3/16/2022    INR goal:  2.0-3.0   TTR:  87.7 % (4.2 y)   INR used for dosin.90 (3/16/2022)   Warfarin maintenance plan:  5 mg (5 mg x 1) every Mon, Wed, Fri; 2.5 mg (5 mg x 0.5) all other days   Weekly warfarin total:  25 mg   Plan last modified:  Bernice Nielson (11/3/2021)   Next INR check:  2022   Target end date:  Indefinite    Indications    Stroke (CMS-HCC) [I63.9] (Resolved) [I63.9]             Anticoagulation Episode Summary     INR check location:  Anticoagulation Clinic    Preferred lab:      Send INR reminders to:      Comments:        Anticoagulation Care Providers     Provider Role Specialty Phone number    [facility] Responsible  [phone]    Marshal Sethi M.D. Responsible Neurology [phone]        Anticoagulation Patient Findings  Patient Findings     Negatives:  Signs/symptoms of thrombosis, Signs/symptoms of bleeding, Laboratory test error suspected, Change in health, Change in alcohol use, Change in activity, Upcoming invasive procedure, Emergency department visit, Upcoming dental procedure, Missed doses, Extra doses, Change in medications, Change in diet/appetite, Hospital admission, Bruising, Other complaints              History of Present Illness: follow up appointment for chronic anticoagulation with the high risk medication, warfarin for stroke    Medications reconciled yes  Pt is not on antiplatelet therapy    Last INR was at goal.  Pt is now sub therapeutic today.  Pt is s/p dental implants.  Pt is slightly sub therapeutic today,  Will bolus dose with 5mg tomorrow then continue current dosing regimen. Follow up in 4 weeks, to reduce the risk of adverse events related to this high risk medication, warfarin.    Bernice Nielson, Clinical Pharmacist

## 2022-04-13 ENCOUNTER — ANTICOAGULATION VISIT (OUTPATIENT)
Dept: MEDICAL GROUP | Facility: PHYSICIAN GROUP | Age: 67
End: 2022-04-13
Payer: MEDICARE

## 2022-04-13 DIAGNOSIS — Z79.01 CHRONIC ANTICOAGULATION: ICD-10-CM

## 2022-04-13 LAB — INR PPP: 1.7 (ref 2–3.5)

## 2022-04-13 PROCEDURE — 85610 PROTHROMBIN TIME: CPT | Mod: QW | Performed by: INTERNAL MEDICINE

## 2022-04-13 PROCEDURE — 99211 OFF/OP EST MAY X REQ PHY/QHP: CPT | Performed by: INTERNAL MEDICINE

## 2022-04-13 NOTE — PROGRESS NOTES
Anticoagulation Summary  As of 2022    INR goal:  2.0-3.0   TTR:  86.2 % (4.3 y)   INR used for dosin.70 (2022)   Warfarin maintenance plan:  2.5 mg (5 mg x 0.5) every Sun, Tue, Thu; 5 mg (5 mg x 1) all other days   Weekly warfarin total:  27.5 mg   Plan last modified:  Bernice Nielson (2022)   Next INR check:  2022   Target end date:  Indefinite    Indications    Stroke (CMS-HCC) [I63.9] (Resolved) [I63.9]             Anticoagulation Episode Summary     INR check location:  Anticoagulation Clinic    Preferred lab:      Send INR reminders to:      Comments:        Anticoagulation Care Providers     Provider Role Specialty Phone number    [facility] Responsible  [phone]    Marshal Sethi M.D. Responsible Neurology [phone]        Anticoagulation Patient Findings  Patient Findings     Positives:  Missed doses    Negatives:  Signs/symptoms of thrombosis, Signs/symptoms of bleeding, Laboratory test error suspected, Change in health, Change in alcohol use, Change in activity, Upcoming invasive procedure, Emergency department visit, Upcoming dental procedure, Extra doses, Change in medications, Change in diet/appetite, Hospital admission, Bruising, Other complaints        History of Present Illness: follow up appointment for chronic anticoagulation with the high risk medication, warfarin for stroke prophylaxis.    Medications reconciled  Pt is not on antiplatelet therapy    Last INR was out of range, dosage adjusted: one time bolus    Pt is to bolus x 1 today w/ 7.5 mg, then increase current warfarin dosing regimen by 10 % as detailed in calendar.  Follow up in 2 weeks, to reduce the risk of adverse events related to this high risk medication, warfarin.        Hugo Hernandez, Pharmacy Intern    I have reviewed and concur with the above plan on 2022.  Bernice Nielson, Clinical Pharmacist

## 2022-04-27 ENCOUNTER — ANTICOAGULATION VISIT (OUTPATIENT)
Dept: CARDIOLOGY | Facility: PHYSICIAN GROUP | Age: 67
End: 2022-04-27
Payer: MEDICARE

## 2022-04-27 DIAGNOSIS — Z79.01 CHRONIC ANTICOAGULATION: ICD-10-CM

## 2022-04-27 LAB — INR PPP: 3.3 (ref 2–3.5)

## 2022-04-27 PROCEDURE — 99211 OFF/OP EST MAY X REQ PHY/QHP: CPT | Performed by: INTERNAL MEDICINE

## 2022-04-27 PROCEDURE — 85610 PROTHROMBIN TIME: CPT | Mod: QW | Performed by: INTERNAL MEDICINE

## 2022-04-27 NOTE — PROGRESS NOTES
Anticoagulation Summary  As of 4/27/2022    INR goal:  2.0-3.0   TTR:  86.0 % (4.3 y)   INR used for dosing:  3.30 (4/27/2022)   Warfarin maintenance plan:  5 mg (5 mg x 1) every Mon, Fri, Sat; 2.5 mg (5 mg x 0.5) all other days   Weekly warfarin total:  25 mg   Plan last modified:  Bernice Nielson (4/27/2022)   Next INR check:  5/18/2022   Target end date:  Indefinite    Indications    Stroke (CMS-HCC) [I63.9] (Resolved) [I63.9]             Anticoagulation Episode Summary     INR check location:  Anticoagulation Clinic    Preferred lab:      Send INR reminders to:      Comments:        Anticoagulation Care Providers     Provider Role Specialty Phone number    [facility] Responsible  [phone]    Marshal Sethi M.D. Responsible Neurology [phone]        Anticoagulation Patient Findings   History of Present Illness: follow up appointment for chronic anticoagulation with the high risk medication, warfarin for stroke    Medications reconciled yes   Pt is not on antiplatelet therapy    Last INR was out of range, dosage adjusted: INRs have been labile.  Reduced weeky dose by 9%,Follow up in 2 weeks, to reduce the risk of adverse events related to this high risk medication, warfarin.    Bernice Nielson, Clinical Pharmacist

## 2022-05-18 ENCOUNTER — ANTICOAGULATION VISIT (OUTPATIENT)
Dept: MEDICAL GROUP | Facility: PHYSICIAN GROUP | Age: 67
End: 2022-05-18
Payer: MEDICARE

## 2022-05-18 DIAGNOSIS — Z79.01 CHRONIC ANTICOAGULATION: ICD-10-CM

## 2022-05-18 LAB — INR PPP: 2 (ref 2–3.5)

## 2022-05-18 PROCEDURE — 99999 PR NO CHARGE: CPT | Performed by: FAMILY MEDICINE

## 2022-05-18 PROCEDURE — 85610 PROTHROMBIN TIME: CPT | Performed by: FAMILY MEDICINE

## 2022-05-18 PROCEDURE — 93793 ANTICOAG MGMT PT WARFARIN: CPT | Performed by: FAMILY MEDICINE

## 2022-05-18 NOTE — PROGRESS NOTES
Anticoagulation Summary  As of 2022    INR goal:  2.0-3.0   TTR:  85.9 % (4.4 y)   INR used for dosin.00 (2022)   Warfarin maintenance plan:  5 mg (5 mg x 1) every Mon, Fri, Sat; 2.5 mg (5 mg x 0.5) all other days   Weekly warfarin total:  25 mg   Plan last modified:  Bernice Nielson (2022)   Next INR check:  2022   Target end date:  Indefinite    Indications    Stroke (CMS-HCC) [I63.9] (Resolved) [I63.9]             Anticoagulation Episode Summary     INR check location:  Anticoagulation Clinic    Preferred lab:      Send INR reminders to:      Comments:        Anticoagulation Care Providers     Provider Role Specialty Phone number    [facility] Responsible  [phone]    Marshal Sethi M.D. Responsible Neurology [phone]        Anticoagulation Patient Findings  Patient Findings     Negatives:  Signs/symptoms of thrombosis, Signs/symptoms of bleeding, Laboratory test error suspected, Change in health, Change in alcohol use, Change in activity, Upcoming invasive procedure, Emergency department visit, Upcoming dental procedure, Missed doses, Extra doses, Change in medications, Change in diet/appetite, Hospital admission, Bruising, Other complaints            History of Present Illness: follow up appointment for chronic anticoagulation with the high risk medication, warfarin for stroke    Medications reconciled es   Pt is not on antiplatelet therapy    Last INR was out of range, dosage adjusted: pt is back at goal.  Pt will be in [place] for the next six week. Follow up in 7 weeks, to reduce the risk of adverse events related to this high risk medication, warfarin.    Bernice Nielson, Clinical Pharmacist

## 2022-07-06 ENCOUNTER — ANTICOAGULATION VISIT (OUTPATIENT)
Dept: CARDIOLOGY | Facility: PHYSICIAN GROUP | Age: 67
End: 2022-07-06
Payer: MEDICARE

## 2022-07-06 DIAGNOSIS — Z79.01 CHRONIC ANTICOAGULATION: ICD-10-CM

## 2022-07-06 LAB — INR PPP: 2.1 (ref 2–3.5)

## 2022-07-06 PROCEDURE — 99999 PR NO CHARGE: CPT | Performed by: NURSE PRACTITIONER

## 2022-07-06 PROCEDURE — 93793 ANTICOAG MGMT PT WARFARIN: CPT | Performed by: NURSE PRACTITIONER

## 2022-07-06 PROCEDURE — 85610 PROTHROMBIN TIME: CPT | Performed by: NURSE PRACTITIONER

## 2022-07-06 NOTE — PROGRESS NOTES
Anticoagulation Summary  As of 2022    INR goal:  2.0-3.0   TTR:  86.3 % (4.5 y)   INR used for dosin.10 (2022)   Warfarin maintenance plan:  5 mg (5 mg x 1) every Mon, Fri, Sat; 2.5 mg (5 mg x 0.5) all other days   Weekly warfarin total:  25 mg   Plan last modified:  Bernice Nielson (2022)   Next INR check:     Target end date:  Indefinite    Indications    Stroke (CMS-HCC) [I63.9] (Resolved) [I63.9]             Anticoagulation Episode Summary     INR check location:  Anticoagulation Clinic    Preferred lab:      Send INR reminders to:      Comments:        Anticoagulation Care Providers     Provider Role Specialty Phone number    [facility] Responsible  [phone]    Marshal Sethi M.D. Responsible Neurology [phone]        Anticoagulation Patient Findings  Patient Findings     Negatives:  Signs/symptoms of thrombosis, Signs/symptoms of bleeding, Laboratory test error suspected, Change in health, Change in alcohol use, Change in activity, Upcoming invasive procedure, Emergency department visit, Upcoming dental procedure, Missed doses, Extra doses, Change in medications, Change in diet/appetite, Hospital admission, Bruising, Other complaints              History of Present Illness: follow up appointment for chronic anticoagulation with the high risk medication, warfarin for stroke    Medications reconciled yes  Pt is not on antiplatelet therapy    Pt remains therapeutic. Continue current dosing regimen.  Follow up in 10 weeks, to reduce the risk of adverse events related to this high risk medication, warfarin.    Bernice Nielson, Clinical Pharmacist

## 2022-09-14 ENCOUNTER — ANTICOAGULATION VISIT (OUTPATIENT)
Dept: CARDIOLOGY | Facility: PHYSICIAN GROUP | Age: 67
End: 2022-09-14
Payer: MEDICARE

## 2022-09-14 DIAGNOSIS — Z79.01 CHRONIC ANTICOAGULATION: ICD-10-CM

## 2022-09-14 LAB — INR PPP: 2.7 (ref 2–3.5)

## 2022-09-14 PROCEDURE — 93793 ANTICOAG MGMT PT WARFARIN: CPT | Performed by: NURSE PRACTITIONER

## 2022-09-14 PROCEDURE — 99999 PR NO CHARGE: CPT | Performed by: NURSE PRACTITIONER

## 2022-09-14 PROCEDURE — 85610 PROTHROMBIN TIME: CPT | Performed by: NURSE PRACTITIONER

## 2022-09-14 NOTE — PROGRESS NOTES
Anticoagulation Summary  As of 2022      INR goal:  2.0-3.0   TTR:  86.8 % (4.7 y)   INR used for dosin.70 (2022)   Warfarin maintenance plan:  5 mg (5 mg x 1) every Mon, Fri, Sat; 2.5 mg (5 mg x 0.5) all other days   Weekly warfarin total:  25 mg   Plan last modified:  Bernice Nielson (2022)   Next INR check:  2022   Target end date:  Indefinite    Indications    Stroke (CMS-HCC) [I63.9] (Resolved) [I63.9]                 Anticoagulation Episode Summary       INR check location:  Anticoagulation Clinic    Preferred lab:      Send INR reminders to:      Comments:            Anticoagulation Care Providers       Provider Role Specialty Phone number    [facility] Responsible  [phone]    Marshal Sethi M.D. Responsible Neurology [phone]          Anticoagulation Patient Findings          History of Present Illness: follow up appointment for chronic anticoagulation with the high risk medication, warfarin for STROKE    Medications reconciled yes  Pt is not on antiplatelet therapy    Pt remains therapeutic today.  Continue current dosing regimen.  Follow up in 12 weeks, to reduce the risk of adverse events related to this high risk medication, warfarin.    Bernice Nielson, Clinical Pharmacist

## 2022-11-08 ENCOUNTER — PATIENT MESSAGE (OUTPATIENT)
Dept: HEALTH INFORMATION MANAGEMENT | Facility: OTHER | Age: 67
End: 2022-11-08

## 2022-12-01 NOTE — PROGRESS NOTES
Anticoagulation Summary  As of 2/19/2020    INR goal:   2.0-3.0   TTR:   83.9 % (2 y)   INR used for dosing:      Warfarin maintenance plan:   5 mg (5 mg x 1) every Mon, Fri; 2.5 mg (5 mg x 0.5) all other days   Weekly warfarin total:   22.5 mg   Plan last modified:   Bernice HORNE Filter (6/5/2019)   Next INR check:      Target end date:   Indefinite    Indications    Stroke (CMS-HCC) [I63.9] (Resolved) [I63.9]             Anticoagulation Episode Summary     INR check location:   Anticoagulation Clinic    Preferred lab:       Send INR reminders to:       Comments:         Anticoagulation Care Providers     Provider Role Specialty Phone number    [facility] Responsible  [phone]    Marshal Sethi M.D. Responsible Neurology [phone]           Anticoagulation Patient Findings  Patient Findings     Negatives:   Signs/symptoms of thrombosis, Signs/symptoms of bleeding, Laboratory test error suspected, Change in health, Change in alcohol use, Change in activity, Upcoming invasive procedure, Emergency department visit, Upcoming dental procedure, Missed doses, Extra doses, Change in medications, Change in diet/appetite, Hospital admission, Bruising, Other complaints          HPI:  Hieu Duong seen in clinic today, on anticoagulation therapy with warfarin for stroke  Changes to current medical/health status since last appt: none  Denies signs/symptoms of bleeding and/or thrombosis since the last appt.    Denies any interval changes to diet  Denies any interval changes to medications since last appt.   Denies any complications or cost restrictions with current therapy.   Verified current warfarin dosing schedule.   BP check declined      A/P   INR  therapeutic.   Pt is to continue with current warfarin dosing regimen.    Follow up appointment in 6 week(s).    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 Plan: Repeat eval of path specimen showed: secretions from conjunctival glands. .. partially calcified/solidified. No signs of amyloid or malignancy. Care options d/w patient and daughter today. Plan for entropion repair with further debulking of masses. The patient demonstrates deborah tarsal entropion, with lashes abrading the cornea/globe, creating a mechanical keratoconjunctivitis. To resolve this, I recommended ATR < 10cm , along with a lateral tarsal strip to elevate/tighten/stabilize the lower lid, and prevent recurrence of entropion. The RBA of surgery were discussed and all questions were answered.

## 2022-12-07 ENCOUNTER — ANTICOAGULATION VISIT (OUTPATIENT)
Dept: MEDICAL GROUP | Facility: PHYSICIAN GROUP | Age: 67
End: 2022-12-07
Payer: MEDICARE

## 2022-12-07 DIAGNOSIS — Z79.01 CHRONIC ANTICOAGULATION: ICD-10-CM

## 2022-12-07 LAB — INR PPP: 1.9 (ref 2–3.5)

## 2022-12-07 PROCEDURE — 85610 PROTHROMBIN TIME: CPT | Performed by: FAMILY MEDICINE

## 2022-12-07 PROCEDURE — 99211 OFF/OP EST MAY X REQ PHY/QHP: CPT | Performed by: FAMILY MEDICINE

## 2022-12-07 NOTE — PROGRESS NOTES
Anticoagulation Summary  As of 2022      INR goal:  2.0-3.0   TTR:  86.9 % (5 y)   INR used for dosin.90 (2022)   Warfarin maintenance plan:  5 mg (5 mg x 1) every Mon, Wed, Fri; 2.5 mg (5 mg x 0.5) all other days; Starting 2022   Weekly warfarin total:  25 mg   Plan last modified:  Sis Horne PharmD (2022)   Next INR check:  2023   Target end date:  Indefinite    Indications    Stroke (CMS-HCC) [I63.9] (Resolved) [I63.9]                 Anticoagulation Episode Summary       INR check location:  Anticoagulation Clinic    Preferred lab:      Send INR reminders to:      Comments:            Anticoagulation Care Providers       Provider Role Specialty Phone number    [facility] Responsible  [phone]    Marshal Sethi M.D. Responsible Neurology [phone]                  Refer to Patient Findings for HPI:  Patient Findings       Negatives:  Signs/symptoms of thrombosis, Signs/symptoms of bleeding, Laboratory test error suspected, Change in health, Change in alcohol use, Change in activity, Upcoming invasive procedure, Emergency department visit, Upcoming dental procedure, Missed doses, Extra doses, Change in medications, Change in diet/appetite, Hospital admission, Bruising, Other complaints            There were no vitals filed for this visit.   pt declined vitals    Verified current warfarin dosing schedule.    Medications reconciled   Pt is not on antiplatelet therapy      A/P   INR  SUB-therapeutic. Previously INR therapeutic and stable. Patient states possibly due to diet changes with the weather getting colder.     Warfarin dosing recommendation: Increase warfarin dose tomorrow to 5 mg and resume warfarin regimen.     Pt educated to contact our clinic with any changes in medications or s/s of bleeding or thrombosis. Pt is aware to seek immediate medical attention for falls, head injury or deep cuts.    Need new referral for patient.     Follow up  appointment in 4 week(s).    iSs Horne, PharmD

## 2023-01-03 ENCOUNTER — TELEPHONE (OUTPATIENT)
Dept: VASCULAR LAB | Facility: MEDICAL CENTER | Age: 68
End: 2023-01-03
Payer: MEDICARE

## 2023-01-03 NOTE — TELEPHONE ENCOUNTER
----- Message from Dalton Taveras PharmD sent at 1/3/2023  7:53 AM PST -----  Regarding: requesting c/b to reschedule anticoag appt

## 2023-01-11 ENCOUNTER — ANTICOAGULATION VISIT (OUTPATIENT)
Dept: MEDICAL GROUP | Facility: PHYSICIAN GROUP | Age: 68
End: 2023-01-11
Payer: MEDICARE

## 2023-01-11 DIAGNOSIS — Z79.01 CHRONIC ANTICOAGULATION: ICD-10-CM

## 2023-01-11 LAB — INR PPP: 1.8 (ref 2–3.5)

## 2023-01-11 PROCEDURE — 99211 OFF/OP EST MAY X REQ PHY/QHP: CPT | Performed by: FAMILY MEDICINE

## 2023-01-11 PROCEDURE — 85610 PROTHROMBIN TIME: CPT | Performed by: FAMILY MEDICINE

## 2023-01-11 NOTE — PROGRESS NOTES
OP Anticoagulation Service Note    Date: 2023    Anticoagulation Summary  As of 2023      INR goal:  2.0-3.0   TTR:  85.2 % (5.1 y)   INR used for dosin.80 (2023)   Warfarin maintenance plan:  2.5 mg (5 mg x 0.5) every Tue, Thu, Sat; 5 mg (5 mg x 1) all other days   Weekly warfarin total:  27.5 mg   Plan last modified:  Jordan So, PharmD (2023)   Next INR check:  2023   Target end date:  Indefinite    Indications    Stroke (CMS-HCC) [I63.9] (Resolved) [I63.9]                 Anticoagulation Episode Summary       INR check location:  Anticoagulation Clinic    Preferred lab:      Send INR reminders to:      Comments:            Anticoagulation Care Providers       Provider Role Specialty Phone number    [facility] Responsible  [phone]    Marshal Sethi M.D. Responsible Neurology [phone]          Anticoagulation Patient Findings  Patient Findings       Positives:  Change in health (Recent COVID+)    Negatives:  Signs/symptoms of thrombosis, Signs/symptoms of bleeding, Laboratory test error suspected, Change in alcohol use, Change in activity, Upcoming invasive procedure, Emergency department visit, Upcoming dental procedure, Missed doses, Extra doses, Change in medications, Change in diet/appetite, Hospital admission, Bruising, Other complaints            HPI:   Hieu Duong is in the Anticoagulation Clinic today for an INR check on their anticoagulation therapy.     The reason for today's visit is to prevent morbidity and mortality from a blood clot and/or stroke and to reduce the risk of bleeding while on a anticoagulant.     PCP:  Marcin Bonds M.D.  [address] [address]    3 vitals included with today's appt-unless patient declined:  (BP, HR, weight, ht, RR)   There were no vitals filed for this visit.    Verified current warfarin dosing schedule.    Medications reconciled   Pt is not on antiplatelet  therapy    Assessment:   INR  SUB-therapeutic.     Plan:  Instructed pt to BOLUS x 1 dose w/ 7.5 mg today and to then begin newly increased regimen of 2.5 mg Tues/Thurs/Sat and 5 mg ROW.    Follow up:  Follow up appointment in 2 week(s).       Other info:  Pt educated to contact our clinic with any changes in medications or s/s of bleeding or thrombosis.  Education was provided today regarding tips to reduce their bleed risk and dietary constraints while on a anticoagulant.    National Recommendations:  The CHEST guidelines recommends frequent INR monitoring at regular intervals (a few days up to a max of 12 weeks) to ensure patients are on the proper dose of warfarin, and patients are not having any complications from therapy.  INRs can dramatically change over a short time period due to diet, medications, and medical conditions.     Jordan So, PharmD, BCACP    [facility]  Phone [phone] fax [phone]

## 2023-01-25 ENCOUNTER — ANTICOAGULATION VISIT (OUTPATIENT)
Dept: CARDIOLOGY | Facility: PHYSICIAN GROUP | Age: 68
End: 2023-01-25
Payer: MEDICARE

## 2023-01-25 DIAGNOSIS — Z79.01 CHRONIC ANTICOAGULATION: ICD-10-CM

## 2023-01-25 LAB — INR PPP: 2.4 (ref 2–3.5)

## 2023-01-25 PROCEDURE — 93793 ANTICOAG MGMT PT WARFARIN: CPT | Performed by: NURSE PRACTITIONER

## 2023-01-25 PROCEDURE — 85610 PROTHROMBIN TIME: CPT | Performed by: NURSE PRACTITIONER

## 2023-01-25 PROCEDURE — 99999 PR NO CHARGE: CPT | Performed by: NURSE PRACTITIONER

## 2023-01-25 NOTE — PROGRESS NOTES
Anticoagulation Summary  As of 2023      INR goal:  2.0-3.0   TTR:  85.1 % (5.1 y)   INR used for dosin.40 (2023)   Warfarin maintenance plan:  2.5 mg (5 mg x 0.5) every Tue, Thu, Sat; 5 mg (5 mg x 1) all other days   Weekly warfarin total:  27.5 mg   Plan last modified:  Jordan So PharmD (2023)   Next INR check:  2023   Target end date:  Indefinite    Indications    Stroke (CMS-HCC) [I63.9] (Resolved) [I63.9]                 Anticoagulation Episode Summary       INR check location:  Anticoagulation Clinic    Preferred lab:      Send INR reminders to:      Comments:            Anticoagulation Care Providers       Provider Role Specialty Phone number    [facility] Responsible  [phone]    Marshal Sethi M.D. Responsible Neurology [phone]                  Refer to Patient Findings for HPI:  Patient Findings       Negatives:  Signs/symptoms of thrombosis, Signs/symptoms of bleeding, Laboratory test error suspected, Change in health, Change in alcohol use, Change in activity, Upcoming invasive procedure, Emergency department visit, Upcoming dental procedure, Missed doses, Extra doses, Change in medications, Change in diet/appetite, Hospital admission, Bruising, Other complaints            There were no vitals filed for this visit.  pt declined vitals    Verified current warfarin dosing schedule.    Medications reconciled   Pt is not on antiplatelet therapy      A/P   INR  -therapeutic. Last INR subtherapeutic and dosing increased at last visit.      Warfarin dosing recommendation: Continue current warfarin regimen as above without changes      Pt educated to contact our clinic with any changes in medications or s/s of bleeding or thrombosis. Pt is aware to seek immediate medical attention for falls, head injury or deep cuts.    Follow up appointment in 4 week(s) per patient preference.    Mary SinghD

## 2023-02-02 DIAGNOSIS — Z86.73 HISTORY OF CVA (CEREBROVASCULAR ACCIDENT): ICD-10-CM

## 2023-02-02 RX ORDER — WARFARIN SODIUM 5 MG/1
TABLET ORAL
Qty: 90 TABLET | Refills: 1 | Status: SHIPPED | OUTPATIENT
Start: 2023-02-02 | End: 2023-08-09

## 2023-02-22 ENCOUNTER — ANTICOAGULATION VISIT (OUTPATIENT)
Dept: CARDIOLOGY | Facility: PHYSICIAN GROUP | Age: 68
End: 2023-02-22
Payer: MEDICARE

## 2023-02-22 DIAGNOSIS — Z79.01 CHRONIC ANTICOAGULATION: ICD-10-CM

## 2023-02-22 LAB — INR PPP: 2.5 (ref 2–3.5)

## 2023-02-22 PROCEDURE — 85610 PROTHROMBIN TIME: CPT | Performed by: NURSE PRACTITIONER

## 2023-02-22 PROCEDURE — 93793 ANTICOAG MGMT PT WARFARIN: CPT | Performed by: NURSE PRACTITIONER

## 2023-02-22 NOTE — PROGRESS NOTES
Anticoagulation Summary  As of 2023      INR goal:  2.0-3.0   TTR:  85.3 % (5.2 y)   INR used for dosin.50 (2023)   Warfarin maintenance plan:  2.5 mg (5 mg x 0.5) every Tue, Thu, Sat; 5 mg (5 mg x 1) all other days   Weekly warfarin total:  27.5 mg   Plan last modified:  Jordan So PharmD (2023)   Next INR check:  3/29/2023   Target end date:  Indefinite    Indications    Stroke (CMS-HCC) [I63.9] (Resolved) [I63.9]                 Anticoagulation Episode Summary       INR check location:  Anticoagulation Clinic    Preferred lab:      Send INR reminders to:      Comments:            Anticoagulation Care Providers       Provider Role Specialty Phone number    [facility] Responsible  [phone]    Marshal Sethi M.D. Responsible Neurology [phone]                  Refer to Patient Findings for HPI:  Patient Findings       Negatives:  Signs/symptoms of thrombosis, Signs/symptoms of bleeding, Laboratory test error suspected, Change in health, Change in alcohol use, Change in activity, Upcoming invasive procedure, Emergency department visit, Upcoming dental procedure, Missed doses, Extra doses, Change in medications, Change in diet/appetite, Hospital admission, Bruising, Other complaints            There were no vitals filed for this visit.  pt declined vitals    Verified current warfarin dosing schedule.    Medications reconciled   Pt is not on antiplatelet therapy      A/P   INR  -therapeutic.     Warfarin dosing recommendation: Continue current warfarin regimen as above without changes      Pt educated to contact our clinic with any changes in medications or s/s of bleeding or thrombosis. Pt is aware to seek immediate medical attention for falls, head injury or deep cuts.    Follow up appointment in 5 week(s).    Mary SinghD

## 2023-03-29 ENCOUNTER — ANTICOAGULATION VISIT (OUTPATIENT)
Dept: CARDIOLOGY | Facility: PHYSICIAN GROUP | Age: 68
End: 2023-03-29
Payer: MEDICARE

## 2023-03-29 DIAGNOSIS — Z79.01 CHRONIC ANTICOAGULATION: ICD-10-CM

## 2023-03-29 DIAGNOSIS — Z79.01 LONG TERM (CURRENT) USE OF ANTICOAGULANTS: Primary | ICD-10-CM

## 2023-03-29 LAB — INR PPP: 2.2 (ref 2–3.5)

## 2023-03-29 PROCEDURE — 85610 PROTHROMBIN TIME: CPT | Performed by: NURSE PRACTITIONER

## 2023-03-29 PROCEDURE — 99999 PR NO CHARGE: CPT | Performed by: STUDENT IN AN ORGANIZED HEALTH CARE EDUCATION/TRAINING PROGRAM

## 2023-03-29 PROCEDURE — 93793 ANTICOAG MGMT PT WARFARIN: CPT | Performed by: NURSE PRACTITIONER

## 2023-03-29 NOTE — PROGRESS NOTES
Anticoagulation Summary  As of 3/29/2023      INR goal:  2.0-3.0   TTR:  85.6 % (5.3 y)   INR used for dosin.20 (3/29/2023)   Warfarin maintenance plan:  2.5 mg (5 mg x 0.5) every Tue, Thu, Sat; 5 mg (5 mg x 1) all other days   Weekly warfarin total:  27.5 mg   Plan last modified:  Mary VieiraD (2023)   Next INR check:  5/10/2023   Target end date:  Indefinite    Indications    Stroke (CMS-HCC) [I63.9] (Resolved) [I63.9]                 Anticoagulation Episode Summary       INR check location:  Anticoagulation Clinic    Preferred lab:      Send INR reminders to:      Comments:            Anticoagulation Care Providers       Provider Role Specialty Phone number    [facility] Responsible  [phone]    Marshal Sethi M.D. Responsible Neurology [phone]          Anticoagulation Patient Findings  Patient Findings       Negatives:  Signs/symptoms of thrombosis, Signs/symptoms of bleeding, Laboratory test error suspected, Change in health, Change in alcohol use, Change in activity, Upcoming invasive procedure, Emergency department visit, Upcoming dental procedure, Missed doses, Extra doses, Change in medications, Change in diet/appetite, Hospital admission, Bruising, Other complaints                  HPI:   Hieu Duong seen in clinic today, on anticoagulation therapy with warfarin due to history of stroke    Patient's previous INR was therapeutic at 2.5 on 23, at which time patient was instructed to continue with current warfarin regimen.  He returns to clinic today to recheck INR to ensure it is therapeutic and thus preventing possible clotting and/or bleeding/bruising complications.    CHADS-VASc = n/a  (unadjusted ischemic stroke risk/year:  n/a)    Does patient have any changes to current medical/health status since last appt (Y/N):  NO  Does patient have any signs/symptoms of bleeding and/or thrombosis since the last appt (Y/N):  NO  Does patient have any  interval changes to diet or medications since last appt (Y/N):  NO  Are there any complications or cost restrictions with current therapy (Y/N):  NO     Does patient have [facility] PCP? Yes, Marcin Bonds M.D. (If not, please document discussion that patient must be seen at [facility])       Vitals:  declined today    There were no vitals filed for this visit.     Asssessment:      INR therapeutic at 2.2, therefore decreasing stroke and/or bleeding risk.   Reason(s) for out of range INR today:  n/a      Pt is not on antiplatelet therapy    Medication reconciliation completed today.    Plan:  Pt is to continue with current warfarin dosing regimen.     Follow up:  Because warfarin is a high risk medication and current CHEST guidelines recommend regular monitoring intervals (few days up to 12 weeks), will have patient return to clinic in 6 weeks to recheck INR.    Dalton Taveras, PharmD, BCACP

## 2023-05-10 ENCOUNTER — ANTICOAGULATION VISIT (OUTPATIENT)
Dept: CARDIOLOGY | Facility: PHYSICIAN GROUP | Age: 68
End: 2023-05-10
Payer: MEDICARE

## 2023-05-10 DIAGNOSIS — Z79.01 CHRONIC ANTICOAGULATION: ICD-10-CM

## 2023-05-10 LAB — INR PPP: 2.5 (ref 2–3.5)

## 2023-05-10 PROCEDURE — 93793 ANTICOAG MGMT PT WARFARIN: CPT | Performed by: INTERNAL MEDICINE

## 2023-05-10 PROCEDURE — 85610 PROTHROMBIN TIME: CPT | Performed by: INTERNAL MEDICINE

## 2023-05-10 PROCEDURE — 99999 PR NO CHARGE: CPT | Performed by: INTERNAL MEDICINE

## 2023-05-10 NOTE — PROGRESS NOTES
Anticoagulation Summary  As of 5/10/2023      INR goal:  2.0-3.0   TTR:  85.9 % (5.4 y)   INR used for dosin.50 (5/10/2023)   Warfarin maintenance plan:  2.5 mg (5 mg x 0.5) every Tue, Thu, Sat; 5 mg (5 mg x 1) all other days   Weekly warfarin total:  27.5 mg   Plan last modified:  Mary VieiraD (2023)   Next INR check:  2023   Target end date:  Indefinite    Indications    Stroke (CMS-HCC) [I63.9] (Resolved) [I63.9]  Chronic anticoagulation [Z79.01]                 Anticoagulation Episode Summary       INR check location:  Anticoagulation Clinic    Preferred lab:      Send INR reminders to:      Comments:            Anticoagulation Care Providers       Provider Role Specialty Phone number    [facility] Responsible  [phone]    Marshal Sethi M.D. Responsible Neurology [phone]                  Refer to Patient Findings for HPI:  Patient Findings       Negatives:  Signs/symptoms of thrombosis, Signs/symptoms of bleeding, Laboratory test error suspected, Change in health, Change in alcohol use, Change in activity, Upcoming invasive procedure, Emergency department visit, Upcoming dental procedure, Missed doses, Extra doses, Change in medications, Change in diet/appetite, Hospital admission, Bruising, Other complaints            There were no vitals filed for this visit.   pt declined vitals    Verified current warfarin dosing schedule.    Medications reconciled   Pt is not on antiplatelet therapy      A/P   INR  therapeutic.     Warfarin dosing recommendation: Pt is to continue with current warfarin dosing regimen.    Pt educated to contact our clinic with any changes in medications or s/s of bleeding or thrombosis. Pt is aware to seek immediate medical attention for falls, head injury or deep cuts.    Follow up appointment in 7 week(s).    Amy Dyer, MaryD

## 2023-06-28 ENCOUNTER — ANTICOAGULATION VISIT (OUTPATIENT)
Dept: CARDIOLOGY | Facility: PHYSICIAN GROUP | Age: 68
End: 2023-06-28
Payer: MEDICARE

## 2023-06-28 DIAGNOSIS — Z79.01 CHRONIC ANTICOAGULATION: ICD-10-CM

## 2023-06-28 LAB — INR PPP: 1.9 (ref 2–3.5)

## 2023-06-28 PROCEDURE — 99211 OFF/OP EST MAY X REQ PHY/QHP: CPT | Performed by: INTERNAL MEDICINE

## 2023-06-28 PROCEDURE — 85610 PROTHROMBIN TIME: CPT | Performed by: INTERNAL MEDICINE

## 2023-06-28 NOTE — PROGRESS NOTES
OP Anticoagulation Service Note    Date: 2023    Anticoagulation Summary  As of 2023      INR goal:  2.0-3.0   TTR:  85.8 % (5.5 y)   INR used for dosin.90 (2023)   Warfarin maintenance plan:  2.5 mg (5 mg x 0.5) every Tue, Thu, Sat; 5 mg (5 mg x 1) all other days   Weekly warfarin total:  27.5 mg   Plan last modified:  Jordan So, PharmD (2023)   Next INR check:  2023   Target end date:  Indefinite    Indications    Stroke (CMS-HCC) [I63.9] (Resolved) [I63.9]  Chronic anticoagulation [Z79.01]                 Anticoagulation Episode Summary       INR check location:  Anticoagulation Clinic    Preferred lab:      Send INR reminders to:      Comments:            Anticoagulation Care Providers       Provider Role Specialty Phone number    [facility] Responsible  [phone]    Marshal Sethi M.D. Responsible Neurology [phone]          Anticoagulation Patient Findings  Patient Findings       Positives:  Change in activity (More active lately.)    Negatives:  Signs/symptoms of thrombosis, Signs/symptoms of bleeding, Laboratory test error suspected, Change in health, Change in alcohol use, Upcoming invasive procedure, Emergency department visit, Upcoming dental procedure, Missed doses, Extra doses, Change in medications, Change in diet/appetite, Hospital admission, Bruising, Other complaints            HPI:   Hieu Duong is in the Anticoagulation Clinic today for an INR check on their anticoagulation therapy.     The reason for today's visit is to prevent morbidity and mortality from a blood clot and/or stroke and to reduce the risk of bleeding while on a anticoagulant.     PCP:  Marcin Bonds M.D.  [address] [address]    3 vitals included with today's appt-unless patient declined:  (BP, HR, weight, ht, RR)   There were no vitals filed for this visit.    Verified current warfarin dosing schedule.    Medications  reconciled   Pt is not on antiplatelet therapy    Assessment:   INR  SUB-therapeutic.     Plan:  Instructed pt to BOLUS x 1 dose w/ 7.5 mg today and to then continue on w/ his current regimen.    Follow up:  Follow up appointment in 3 week(s).       Other info:  Pt educated to contact our clinic with any changes in medications or s/s of bleeding or thrombosis.  Education was provided today regarding tips to reduce their bleed risk and dietary constraints while on a anticoagulant.    National Recommendations:  The CHEST guidelines recommends frequent INR monitoring at regular intervals (a few days up to a max of 12 weeks) to ensure patients are on the proper dose of warfarin, and patients are not having any complications from therapy.  INRs can dramatically change over a short time period due to diet, medications, and medical conditions.     Jordan So, PharmD, BCACP    [facility]  Phone [phone] fax [phone]

## 2023-07-19 ENCOUNTER — ANTICOAGULATION VISIT (OUTPATIENT)
Dept: CARDIOLOGY | Facility: PHYSICIAN GROUP | Age: 68
End: 2023-07-19
Payer: MEDICARE

## 2023-07-19 DIAGNOSIS — Z79.01 CHRONIC ANTICOAGULATION: ICD-10-CM

## 2023-07-19 LAB — INR PPP: 1.9 (ref 2–3.5)

## 2023-07-19 PROCEDURE — 85610 PROTHROMBIN TIME: CPT | Performed by: INTERNAL MEDICINE

## 2023-07-19 PROCEDURE — 93793 ANTICOAG MGMT PT WARFARIN: CPT | Performed by: INTERNAL MEDICINE

## 2023-07-19 NOTE — PROGRESS NOTES
Anticoagulation Summary  As of 2023      INR goal:  2.0-3.0   TTR:  85.0 % (5.6 y)   INR used for dosin.90 (2023)   Warfarin maintenance plan:  2.5 mg (5 mg x 0.5) every Tue, Thu, Sat; 5 mg (5 mg x 1) all other days   Weekly warfarin total:  27.5 mg   Plan last modified:  Jordan So PharmD (2023)   Next INR check:  2023   Target end date:  Indefinite    Indications    Stroke (CMS-HCC) [I63.9] (Resolved) [I63.9]  Chronic anticoagulation [Z79.01]                 Anticoagulation Episode Summary       INR check location:  Anticoagulation Clinic    Preferred lab:      Send INR reminders to:      Comments:            Anticoagulation Care Providers       Provider Role Specialty Phone number    [facility] Responsible  [phone]    Marshal Sethi M.D. Responsible Neurology [phone]                  Refer to Patient Findings for HPI:  Patient Findings       Negatives:  Signs/symptoms of thrombosis, Signs/symptoms of bleeding, Laboratory test error suspected, Change in health, Change in alcohol use, Change in activity, Upcoming invasive procedure, Emergency department visit, Upcoming dental procedure, Missed doses, Extra doses, Change in medications, Change in diet/appetite, Hospital admission, Bruising, Other complaints            There were no vitals filed for this visit.  pt declined vitals    Verified current warfarin dosing schedule.    Medications reconciled   Pt is not on antiplatelet therapy      A/P   INR  SUB-therapeutic. INR has been stable at 1.9.      Warfarin dosing recommendation: Increase regimen as indicated per calendar.     Pt educated to contact our clinic with any changes in medications or s/s of bleeding or thrombosis. Pt is aware to seek immediate medical attention for falls, head injury or deep cuts.    Follow up appointment in 4 week(s).    Mary SinghD

## 2023-08-16 ENCOUNTER — ANTICOAGULATION VISIT (OUTPATIENT)
Dept: CARDIOLOGY | Facility: PHYSICIAN GROUP | Age: 68
End: 2023-08-16
Payer: MEDICARE

## 2023-08-16 DIAGNOSIS — Z79.01 CHRONIC ANTICOAGULATION: ICD-10-CM

## 2023-08-16 LAB — INR PPP: 2.1 (ref 2–3.5)

## 2023-08-16 PROCEDURE — 85610 PROTHROMBIN TIME: CPT | Performed by: NURSE PRACTITIONER

## 2023-08-16 PROCEDURE — 93793 ANTICOAG MGMT PT WARFARIN: CPT | Performed by: INTERNAL MEDICINE

## 2023-08-16 NOTE — PROGRESS NOTES
Anticoagulation Summary  As of 2023      INR goal:  2.0-3.0   TTR:  84.5 % (5.6 y)   INR used for dosin.10 (2023)   Warfarin maintenance plan:  2.5 mg (5 mg x 0.5) every e, Thu, Sat; 5 mg (5 mg x 1) all other days   Weekly warfarin total:  27.5 mg   Plan last modified:  Jordan So PharmD (2023)   Next INR check:  2023   Target end date:  Indefinite    Indications    Stroke (CMS-HCC) [I63.9] (Resolved) [I63.9]  Chronic anticoagulation [Z79.01]                 Anticoagulation Episode Summary       INR check location:  Anticoagulation Clinic    Preferred lab:      Send INR reminders to:      Comments:            Anticoagulation Care Providers       Provider Role Specialty Phone number    [facility] Responsible  [phone]    Marshal Sethi M.D. Responsible Neurology [phone]                  Refer to Patient Findings for HPI:  Patient Findings       Negatives:  Signs/symptoms of thrombosis, Signs/symptoms of bleeding, Laboratory test error suspected, Change in health, Change in alcohol use, Change in activity, Upcoming invasive procedure, Emergency department visit, Upcoming dental procedure, Missed doses, Extra doses, Change in medications, Change in diet/appetite, Hospital admission, Bruising, Other complaints            There were no vitals filed for this visit.  pt declined vitals    Verified current warfarin dosing schedule.    Medications reconciled   Pt is not on antiplatelet therapy      A/P   INR  -therapeutic.     Warfarin dosing recommendation: Continue current warfarin regimen as above without changes (every other week 5 mg on )      Pt educated to contact our clinic with any changes in medications or s/s of bleeding or thrombosis. Pt is aware to seek immediate medical attention for falls, head injury or deep cuts.    Follow up appointment in 6 week(s).    Mary SinghD

## 2023-09-19 ENCOUNTER — TELEPHONE (OUTPATIENT)
Dept: VASCULAR LAB | Facility: MEDICAL CENTER | Age: 68
End: 2023-09-19
Payer: MEDICARE

## 2023-09-19 DIAGNOSIS — Z79.01 CHRONIC ANTICOAGULATION: ICD-10-CM

## 2023-09-19 NOTE — TELEPHONE ENCOUNTER
Received call from pt that he is unable to make his INR appt tomorrow as his wife is finishing chemotherapy in [place].    Emailed pt INR standing order    Amy Dyer, PharmD

## 2023-09-27 ENCOUNTER — APPOINTMENT (OUTPATIENT)
Dept: CARDIOLOGY | Facility: PHYSICIAN GROUP | Age: 68
End: 2023-09-27
Payer: MEDICARE

## 2023-09-28 ENCOUNTER — ANTICOAGULATION MONITORING (OUTPATIENT)
Dept: VASCULAR LAB | Facility: MEDICAL CENTER | Age: 68
End: 2023-09-28
Payer: MEDICARE

## 2023-09-28 DIAGNOSIS — Z79.01 CHRONIC ANTICOAGULATION: ICD-10-CM

## 2023-09-28 LAB — INR PPP: 2.5 (ref 2–3.5)

## 2023-09-28 NOTE — PROGRESS NOTES
Anticoagulation Summary  As of 2023      INR goal:  2.0-3.0   TTR:  84.8 % (5.8 y)   INR used for dosin.50 (2023)   Warfarin maintenance plan:  2.5 mg (5 mg x 0.5) every Tue, Thu, Sat; 5 mg (5 mg x 1) all other days   Weekly warfarin total:  27.5 mg   Plan last modified:  Jordan So PharmD (2023)   Next INR check:  2023   Target end date:  Indefinite    Indications    Stroke (CMS-HCC) [I63.9] (Resolved) [I63.9]  Chronic anticoagulation [Z79.01]                 Anticoagulation Episode Summary       INR check location:  Anticoagulation Clinic    Preferred lab:      Send INR reminders to:      Comments:            Anticoagulation Care Providers       Provider Role Specialty Phone number    [facility] Responsible  [phone]    Marshal Sethi M.D. Responsible Neurology [phone]          Anticoagulation Patient Findings          Left voicemail message to report a therapeutic INR of 2.5.  Pt to continue with current warfarin dosing regimen. Requested pt contact the clinic for any s/s of unusual bleeding, bruising, clotting or any changes to diet or medication. FU 8 weeks.    Saurav Walsh, PharmD

## 2023-10-19 ENCOUNTER — TELEPHONE (OUTPATIENT)
Dept: MEDICAL GROUP | Facility: PHYSICIAN GROUP | Age: 68
End: 2023-10-19
Payer: MEDICARE

## 2023-10-19 NOTE — TELEPHONE ENCOUNTER
Called number on file as well as wife's number with no answer. I have left a VM for pt to get schedule with dr. samaniego

## 2023-10-19 NOTE — TELEPHONE ENCOUNTER
----- Message from Doug Molina M.D. sent at 10/19/2023  9:19 AM PDT -----  Can you call this patient and make him an appointment with me on Monday please?

## 2023-10-25 ENCOUNTER — HOSPITAL ENCOUNTER (INPATIENT)
Facility: REHABILITATION | Age: 68
LOS: 6 days | DRG: 560 | End: 2023-10-31
Attending: PHYSICAL MEDICINE & REHABILITATION | Admitting: PHYSICAL MEDICINE & REHABILITATION
Payer: MEDICARE

## 2023-10-25 DIAGNOSIS — G47.09 OTHER INSOMNIA: ICD-10-CM

## 2023-10-25 DIAGNOSIS — F32.A DEPRESSION, UNSPECIFIED DEPRESSION TYPE: ICD-10-CM

## 2023-10-25 DIAGNOSIS — S72.001A CLOSED RIGHT HIP FRACTURE, INITIAL ENCOUNTER (HCC): ICD-10-CM

## 2023-10-25 DIAGNOSIS — D64.9 ANEMIA, UNSPECIFIED TYPE: ICD-10-CM

## 2023-10-25 DIAGNOSIS — Z86.73 HISTORY OF CVA (CEREBROVASCULAR ACCIDENT): ICD-10-CM

## 2023-10-25 DIAGNOSIS — Z87.19 HISTORY OF GI BLEED: ICD-10-CM

## 2023-10-25 PROBLEM — R79.89 AZOTEMIA: Status: ACTIVE | Noted: 2023-10-25

## 2023-10-25 PROBLEM — E83.39 HYPOPHOSPHATEMIA: Status: ACTIVE | Noted: 2023-10-25

## 2023-10-25 LAB
GLUCOSE BLD STRIP.AUTO-MCNC: 125 MG/DL (ref 65–99)
GLUCOSE BLD STRIP.AUTO-MCNC: 153 MG/DL (ref 65–99)
GLUCOSE BLD STRIP.AUTO-MCNC: 191 MG/DL (ref 65–99)

## 2023-10-25 PROCEDURE — 770010 HCHG ROOM/CARE - REHAB SEMI PRIVAT*

## 2023-10-25 PROCEDURE — 700102 HCHG RX REV CODE 250 W/ 637 OVERRIDE(OP): Performed by: HOSPITALIST

## 2023-10-25 PROCEDURE — A9270 NON-COVERED ITEM OR SERVICE: HCPCS | Performed by: HOSPITALIST

## 2023-10-25 PROCEDURE — 82962 GLUCOSE BLOOD TEST: CPT | Mod: 91

## 2023-10-25 PROCEDURE — 99222 1ST HOSP IP/OBS MODERATE 55: CPT | Performed by: HOSPITALIST

## 2023-10-25 PROCEDURE — 99223 1ST HOSP IP/OBS HIGH 75: CPT | Performed by: PHYSICAL MEDICINE & REHABILITATION

## 2023-10-25 PROCEDURE — 94760 N-INVAS EAR/PLS OXIMETRY 1: CPT

## 2023-10-25 PROCEDURE — 700102 HCHG RX REV CODE 250 W/ 637 OVERRIDE(OP): Performed by: PHYSICAL MEDICINE & REHABILITATION

## 2023-10-25 PROCEDURE — A9270 NON-COVERED ITEM OR SERVICE: HCPCS | Performed by: PHYSICAL MEDICINE & REHABILITATION

## 2023-10-25 RX ORDER — WARFARIN SODIUM 5 MG/1
5 TABLET ORAL DAILY
Status: DISCONTINUED | OUTPATIENT
Start: 2023-10-25 | End: 2023-10-25

## 2023-10-25 RX ORDER — ONDANSETRON 2 MG/ML
4 INJECTION INTRAMUSCULAR; INTRAVENOUS 4 TIMES DAILY PRN
Status: DISCONTINUED | OUTPATIENT
Start: 2023-10-25 | End: 2023-10-31 | Stop reason: HOSPADM

## 2023-10-25 RX ORDER — ACETAMINOPHEN 500 MG
500 TABLET ORAL 4 TIMES DAILY
Status: DISCONTINUED | OUTPATIENT
Start: 2023-10-25 | End: 2023-10-31 | Stop reason: HOSPADM

## 2023-10-25 RX ORDER — WARFARIN SODIUM 2.5 MG/1
2.5 TABLET ORAL
Status: DISCONTINUED | OUTPATIENT
Start: 2023-10-26 | End: 2023-10-25

## 2023-10-25 RX ORDER — ACETAMINOPHEN 325 MG/1
650 TABLET ORAL EVERY 4 HOURS PRN
Status: DISCONTINUED | OUTPATIENT
Start: 2023-10-25 | End: 2023-10-31 | Stop reason: HOSPADM

## 2023-10-25 RX ORDER — ALUMINA, MAGNESIA, AND SIMETHICONE 2400; 2400; 240 MG/30ML; MG/30ML; MG/30ML
20 SUSPENSION ORAL
Status: DISCONTINUED | OUTPATIENT
Start: 2023-10-25 | End: 2023-10-31 | Stop reason: HOSPADM

## 2023-10-25 RX ORDER — WARFARIN SODIUM 5 MG/1
5 TABLET ORAL
Status: COMPLETED | OUTPATIENT
Start: 2023-10-25 | End: 2023-10-25

## 2023-10-25 RX ORDER — WARFARIN SODIUM 5 MG/1
5 TABLET ORAL
Status: DISCONTINUED | OUTPATIENT
Start: 2023-10-25 | End: 2023-10-25

## 2023-10-25 RX ORDER — POLYETHYLENE GLYCOL 3350 17 G/17G
1 POWDER, FOR SOLUTION ORAL
Status: DISCONTINUED | OUTPATIENT
Start: 2023-10-25 | End: 2023-10-27

## 2023-10-25 RX ORDER — LANOLIN ALCOHOL/MO/W.PET/CERES
3 CREAM (GRAM) TOPICAL NIGHTLY PRN
Status: DISCONTINUED | OUTPATIENT
Start: 2023-10-25 | End: 2023-10-31 | Stop reason: HOSPADM

## 2023-10-25 RX ORDER — OMEPRAZOLE 20 MG/1
40 CAPSULE, DELAYED RELEASE ORAL DAILY
Status: DISCONTINUED | OUTPATIENT
Start: 2023-10-25 | End: 2023-10-31 | Stop reason: HOSPADM

## 2023-10-25 RX ORDER — INSULIN LISPRO 100 [IU]/ML
4 INJECTION, SOLUTION INTRAVENOUS; SUBCUTANEOUS
Status: DISCONTINUED | OUTPATIENT
Start: 2023-10-25 | End: 2023-10-25

## 2023-10-25 RX ORDER — BISACODYL 10 MG
10 SUPPOSITORY, RECTAL RECTAL
Status: DISCONTINUED | OUTPATIENT
Start: 2023-10-25 | End: 2023-10-27

## 2023-10-25 RX ORDER — DEXTROSE MONOHYDRATE 25 G/50ML
25 INJECTION, SOLUTION INTRAVENOUS
Status: DISCONTINUED | OUTPATIENT
Start: 2023-10-25 | End: 2023-10-28

## 2023-10-25 RX ORDER — TRAZODONE HYDROCHLORIDE 50 MG/1
50 TABLET ORAL
Status: DISCONTINUED | OUTPATIENT
Start: 2023-10-25 | End: 2023-10-25

## 2023-10-25 RX ORDER — DEXTROSE MONOHYDRATE 25 G/50ML
25 INJECTION, SOLUTION INTRAVENOUS
Status: DISCONTINUED | OUTPATIENT
Start: 2023-10-25 | End: 2023-10-25

## 2023-10-25 RX ORDER — ZOLPIDEM TARTRATE 5 MG/1
5 TABLET ORAL NIGHTLY PRN
Status: DISCONTINUED | OUTPATIENT
Start: 2023-10-25 | End: 2023-10-27

## 2023-10-25 RX ORDER — LIDOCAINE 50 MG/G
1 PATCH TOPICAL
Status: DISCONTINUED | OUTPATIENT
Start: 2023-10-25 | End: 2023-10-31 | Stop reason: HOSPADM

## 2023-10-25 RX ORDER — ESCITALOPRAM OXALATE 10 MG/1
10 TABLET ORAL
Status: DISCONTINUED | OUTPATIENT
Start: 2023-10-25 | End: 2023-10-31 | Stop reason: HOSPADM

## 2023-10-25 RX ORDER — HYDRALAZINE HYDROCHLORIDE 25 MG/1
25 TABLET, FILM COATED ORAL EVERY 8 HOURS PRN
Status: DISCONTINUED | OUTPATIENT
Start: 2023-10-25 | End: 2023-10-31 | Stop reason: HOSPADM

## 2023-10-25 RX ORDER — ECHINACEA PURPUREA EXTRACT 125 MG
2 TABLET ORAL PRN
Status: DISCONTINUED | OUTPATIENT
Start: 2023-10-25 | End: 2023-10-31 | Stop reason: HOSPADM

## 2023-10-25 RX ORDER — AMOXICILLIN 250 MG
2 CAPSULE ORAL 2 TIMES DAILY
Status: DISCONTINUED | OUTPATIENT
Start: 2023-10-25 | End: 2023-10-27

## 2023-10-25 RX ORDER — WARFARIN SODIUM 2.5 MG/1
2.5 TABLET ORAL
Status: COMPLETED | OUTPATIENT
Start: 2023-10-26 | End: 2023-10-26

## 2023-10-25 RX ORDER — ATORVASTATIN CALCIUM 10 MG/1
20 TABLET, FILM COATED ORAL EVERY EVENING
Status: DISCONTINUED | OUTPATIENT
Start: 2023-10-25 | End: 2023-10-31 | Stop reason: HOSPADM

## 2023-10-25 RX ORDER — CARBOXYMETHYLCELLULOSE SODIUM 5 MG/ML
1 SOLUTION/ DROPS OPHTHALMIC PRN
Status: DISCONTINUED | OUTPATIENT
Start: 2023-10-25 | End: 2023-10-31 | Stop reason: HOSPADM

## 2023-10-25 RX ORDER — OXYCODONE HYDROCHLORIDE 5 MG/1
5-10 TABLET ORAL EVERY 4 HOURS PRN
Status: DISCONTINUED | OUTPATIENT
Start: 2023-10-25 | End: 2023-10-27

## 2023-10-25 RX ORDER — ONDANSETRON 4 MG/1
4 TABLET, ORALLY DISINTEGRATING ORAL 4 TIMES DAILY PRN
Status: DISCONTINUED | OUTPATIENT
Start: 2023-10-25 | End: 2023-10-31 | Stop reason: HOSPADM

## 2023-10-25 RX ADMIN — WARFARIN SODIUM 5 MG: 5 TABLET ORAL at 17:03

## 2023-10-25 RX ADMIN — DIBASIC SODIUM PHOSPHATE, MONOBASIC POTASSIUM PHOSPHATE AND MONOBASIC SODIUM PHOSPHATE 250 MG: 852; 155; 130 TABLET ORAL at 17:02

## 2023-10-25 RX ADMIN — DIBASIC SODIUM PHOSPHATE, MONOBASIC POTASSIUM PHOSPHATE AND MONOBASIC SODIUM PHOSPHATE 250 MG: 852; 155; 130 TABLET ORAL at 20:49

## 2023-10-25 RX ADMIN — ATORVASTATIN CALCIUM 20 MG: 10 TABLET, FILM COATED ORAL at 20:49

## 2023-10-25 RX ADMIN — MAGNESIUM HYDROXIDE 30 ML: 1200 LIQUID ORAL at 17:00

## 2023-10-25 RX ADMIN — SENNOSIDES AND DOCUSATE SODIUM 2 TABLET: 50; 8.6 TABLET ORAL at 20:49

## 2023-10-25 RX ADMIN — ESCITALOPRAM OXALATE 10 MG: 10 TABLET ORAL at 20:49

## 2023-10-25 RX ADMIN — OXYCODONE HYDROCHLORIDE 10 MG: 5 TABLET ORAL at 17:03

## 2023-10-25 RX ADMIN — OXYCODONE HYDROCHLORIDE 5 MG: 5 TABLET ORAL at 21:11

## 2023-10-25 ASSESSMENT — LIFESTYLE VARIABLES
EVER_SMOKED: YES
CONSUMPTION TOTAL: NEGATIVE
EVER HAD A DRINK FIRST THING IN THE MORNING TO STEADY YOUR NERVES TO GET RID OF A HANGOVER: NO
EVER FELT BAD OR GUILTY ABOUT YOUR DRINKING: NO
TOTAL SCORE: 0
HAVE YOU EVER FELT YOU SHOULD CUT DOWN ON YOUR DRINKING: NO
ON A TYPICAL DAY WHEN YOU DRINK ALCOHOL HOW MANY DRINKS DO YOU HAVE: 1
TOTAL SCORE: 0
TOTAL SCORE: 0
HOW MANY TIMES IN THE PAST YEAR HAVE YOU HAD 5 OR MORE DRINKS IN A DAY: 0
AVERAGE NUMBER OF DAYS PER WEEK YOU HAVE A DRINK CONTAINING ALCOHOL: 3
HAVE PEOPLE ANNOYED YOU BY CRITICIZING YOUR DRINKING: NO
ALCOHOL_USE: YES

## 2023-10-25 ASSESSMENT — ENCOUNTER SYMPTOMS
VOMITING: 0
ABDOMINAL PAIN: 0
BRUISES/BLEEDS EASILY: 0
EYES NEGATIVE: 1
POLYDIPSIA: 0
FEVER: 0
SHORTNESS OF BREATH: 0
CHILLS: 0
COUGH: 0
PALPITATIONS: 0
NAUSEA: 0

## 2023-10-25 ASSESSMENT — PATIENT HEALTH QUESTIONNAIRE - PHQ9
SUM OF ALL RESPONSES TO PHQ9 QUESTIONS 1 AND 2: 0
2. FEELING DOWN, DEPRESSED, IRRITABLE, OR HOPELESS: NOT AT ALL
1. LITTLE INTEREST OR PLEASURE IN DOING THINGS: NOT AT ALL

## 2023-10-25 ASSESSMENT — FIBROSIS 4 INDEX: FIB4 SCORE: 1.42

## 2023-10-25 NOTE — CONSULTS
HOSPITAL MEDICINE CONSULTATION    Requesting Physician:  Dr. Farnsworth    Reason for Consult:  Diabetes Mellitus    History of Present Illness:  The patient is a 68-year-old male with past medical history significant for cerebrovascular accident on Coumadin, insulin dependent diabetes mellitus, and depression.  He was admitted to [facility] on 10/20/23 following a fall.  The patient was found to have a right hip fracture and underwent hemiarthroplasty on 10/21/23.  He had post-operative hypotension and anemia, requiring blood transfusion.  Due to his ongoing functional debility, the patient was transferred to [facility] on 10/25/23.  Hospital Medicine consultation is requested to assist in the management of this patient's DM.  He is also noted to have azotemia and hypophosphatemia.    Review of Systems:  Review of Systems   Constitutional:  Negative for chills and fever.   HENT: Negative.     Eyes: Negative.    Respiratory:  Negative for cough and shortness of breath.    Cardiovascular:  Negative for chest pain and palpitations.   Gastrointestinal:  Negative for abdominal pain, nausea and vomiting.   Musculoskeletal:  Positive for joint pain.   Skin:  Negative for itching and rash.   Endo/Heme/Allergies:  Negative for polydipsia. Does not bruise/bleed easily.   All other systems reviewed and are negative.      Allergies:  Allergies   Allergen Reactions    Tdap [Dipth, Acell Pertus, Tetanus]        Medications:    Current Facility-Administered Medications:     Respiratory Therapy Consult, , Nebulization, Continuous RT, Montserrat Farnsworth D.O.    Pharmacy Consult Request ...Pain Management Review 1 Each, 1 Each, Other, PHARMACY TO DOSE, Montserrat Farnsworth D.O.    lidocaine (Lidoderm) 5 % 1 Patch, 1 Patch, Transdermal, QDAY PRN, Montserrat Farnsworth D.O.    hydrALAZINE (Apresoline) tablet 25 mg, 25 mg, Oral, Q8HRS PRN, Montserrat Farnsworth D.O.    senna-docusate (Pericolace Or  Senokot S) 8.6-50 MG per tablet 2 Tablet, 2 Tablet, Oral, BID **AND** polyethylene glycol/lytes (Miralax) PACKET 1 Packet, 1 Packet, Oral, QDAY PRN **AND** magnesium hydroxide (Milk Of Magnesia) suspension 30 mL, 30 mL, Oral, QDAY PRN **AND** bisacodyl (Dulcolax) suppository 10 mg, 10 mg, Rectal, QDAY PRN, Montserrat Farnsworth D.O.    acetaminophen (Tylenol) tablet 650 mg, 650 mg, Oral, Q4HRS PRN, Montserrat Farnsworth D.O.    carboxymethylcellulose (Refresh Tears) 0.5 % ophthalmic drops 1 Drop, 1 Drop, Both Eyes, PRN, Montserrat Farnsworth D.O.    benzocaine-menthol (Cepacol) lozenge 1 Lozenge, 1 Lozenge, Mouth/Throat, Q2HRS PRN, Montserrat Farnsworth D.O.    mag hydrox-al hydrox-simeth (Maalox Plus Es Or Mylanta Ds) suspension 20 mL, 20 mL, Oral, Q2HRS PRN, Montserrat Farnsworth D.O.    ondansetron (Zofran ODT) dispertab 4 mg, 4 mg, Oral, 4X/DAY PRN **OR** ondansetron (Zofran) syringe/vial injection 4 mg, 4 mg, Intramuscular, 4X/DAY PRN, Montserrat Farnsworth D.O.    sodium chloride (Ocean) 0.65 % nasal spray 2 Spray, 2 Spray, Nasal, PRN, Montserrat Farnsworth D.O.    escitalopram (Lexapro) tablet 10 mg, 10 mg, Oral, QHS, Montserrat Farnsworth D.O.    melatonin tablet 3 mg, 3 mg, Oral, HS PRN, Montserrat Farnsworth D.O.    oxyCODONE immediate-release (Roxicodone) tablet 5-10 mg, 5-10 mg, Oral, Q4HRS PRN, Montserrat Farnsworth D.O.    atorvastatin (Lipitor) tablet 20 mg, 20 mg, Oral, Q EVENING, Montserrat Farnsworth D.O.    omeprazole (PriLOSEC) capsule 40 mg, 40 mg, Oral, DAILY, Montserrat Farnsworth D.O.    [START ON 10/26/2023] insulin GLARGINE (Lantus,Semglee) injection, 14 Units, Subcutaneous, DAILY, Montserrat Farnsworth D.O.    insulin regular (HumuLIN R,NovoLIN R) injection, 2-12 Units, Subcutaneous, 4X/DAY ACHS **AND** POC blood glucose manual result, , , Q AC AND BEDTIME(S) **AND** NOTIFY MD and PharmD, , , Once **AND** Administer 20 grams of glucose (approximately 8 ounces of fruit juice) every 15 minutes PRN FSBG less than 70 mg/dL, , , PRN  **AND** dextrose 50% (D50W) injection 25 g, 25 g, Intravenous, Q15 MIN PRN, Talita Dean M.D.    acetaminophen (Tylenol) tablet 500 mg, 500 mg, Oral, 4X/DAY, Montserrat Farnsworth D.O.    zolpidem (Ambien) tablet 5 mg, 5 mg, Oral, HS PRN, Montserrat Farnsworth D.O. MD Alert...Warfarin per Pharmacy, , Other, PHARMACY TO DOSE, Montserrat Farnsworth D.O.    warfarin (Coumadin) tablet 5 mg, 5 mg, Oral, ONCE AT 1800, Montserrat Farnsworth D.O.    [START ON 10/26/2023] warfarin (Coumadin) tablet 2.5 mg, 2.5 mg, Oral, ONCE AT 1800, Montserrat Farnsworth D.O.    phosphorus (K-Phos-Neutral) per tablet 250 mg, 250 mg, Oral, TID, Talita Dean M.D.    Past Medical/Surgical History:  Past Medical History:   Diagnosis Date    Diabetes (HCC)     GI bleed     after taking blood thinner    Hyperlipidemia     Hypertension     Stroke (HCC)     Vision loss     peripheral right eye due to cva     No past surgical history on file.    Social History:  Social History     Socioeconomic History    Marital status:      Spouse name: Not on file    Number of children: Not on file    Years of education: Not on file    Highest education level: Not on file   Occupational History    Not on file   Tobacco Use    Smoking status: Never    Smokeless tobacco: Never   Substance and Sexual Activity    Alcohol use: No    Drug use: No    Sexual activity: Yes     Partners: Female   Other Topics Concern    Not on file   Social History Narrative    Not on file     Social Determinants of Health     Financial Resource Strain: Not on file   Food Insecurity: Not on file   Transportation Needs: Not on file   Physical Activity: Not on file   Stress: Not on file   Social Connections: Not on file   Intimate Partner Violence: Not on file   Housing Stability: Not on file       Family History:  Family History   Problem Relation Age of Onset    Diabetes Brother        Physical Examination:   Vitals:    10/25/23 1338   BP: 121/68   Pulse: 77   Resp: 15   Temp: 37.2 °C (99 °F)  "  [facility]   SpO2: 95%   Weight: 62.8 kg (138 lb 8 oz)   Height: 1.702 m (5' 7\")       Physical Exam  Vitals reviewed.   Constitutional:       General: He is not in acute distress.     Appearance: Normal appearance. He is not ill-appearing.   HENT:      Head: Normocephalic and atraumatic.      Right Ear: External ear normal.      Left Ear: External ear normal.      Nose: Nose normal.      Mouth/Throat:      Pharynx: Oropharynx is clear.   Eyes:      General:         Right eye: No discharge.         Left eye: No discharge.      Extraocular Movements: Extraocular movements intact.      Conjunctiva/sclera: Conjunctivae normal.   Cardiovascular:      Rate and Rhythm: Normal rate and regular rhythm.   Pulmonary:      Effort: Pulmonary effort is normal. No respiratory distress.      Breath sounds: Normal breath sounds. No wheezing.   Abdominal:      General: Bowel sounds are normal. There is no distension.      Palpations: Abdomen is soft.      Tenderness: There is no abdominal tenderness.   Musculoskeletal:      Cervical back: Normal range of motion and neck supple.      Right lower leg: Edema present.      Left lower leg: No edema.   Skin:     General: Skin is warm and dry.   Neurological:      Mental Status: He is alert and oriented to person, place, and time.         Laboratory Data:  Recent Labs     10/23/23  0738 10/23/23  1211 10/24/23  0805 10/25/23  0729   RBC 2.63*  --  2.21* 3.25*   HEMOGLOBIN 8.6* 7.9* 7.1* 10.4*   HEMATOCRIT 24.3* 22.9* 20.3* 29.6*   MCV 92.1  --  91.7 91.0   MCH 32.8  --  31.9 32.0   MCHC 35.6  --  34.8 35.2   RDW 12.4  --  12.8 13.6   PLATELETCT 181  --  191 208   MPV 9.1  --  8.2 8.1     Recent Labs     10/23/23  0738 10/24/23  0805   SODIUM 134* 136   POTASSIUM 4.4 4.0   CHLORIDE 101* 102   CO2 26 30   GLUCOSE 279* 172*   BUN 34* 24*   CREATININE 0.86 0.62*   CALCIUM 8.4* 7.7*       Imaging:  No orders to display       Impressions/Recommendations:  History of CVA (cerebrovascular " accident)  On Coumadin and Lipitor    Type 1 diabetes mellitus without complication ([facility])  Check HbA1c  Continue Lantus qam  Discontinue scheduled qac insulin  Increase SSI  Outpt meds include Tresiba 14 u qam and SSI    Azotemia  Encourage PO fluids  Follow renal function    Anemia  Had post-op anemia  S/P PRBC at [facility]  Check Fe Panel  Follow H/H    Depression  On Lexapro    Hypophosphatemia  Start supplement  Check F/U labs in AM    Closed right hip fracture, initial encounter ([facility])  2/2 fall  S/P R hip hemiarthroplasty on 10/21/23 at [facility]  Wound care and pain control per Physiatry    Full Code    Discussed with patient, RN (Deidra), and Dr. Farnsworth.  Thank you for the opportunity to assist in this patient's care.  We will continue to follow along with you.

## 2023-10-25 NOTE — PROGRESS NOTES
Patient admitted to facility at 1255 via gurney; accompanied by hospital transport. Patient assisted to room and positioned in bed for comfort and safety, call light within reach. Patient assisted with stowing belongings and oriented to room and facility. Admission assessment performed and documented in computer. Admission paperwork completed, signed copies placed in chart. Will continue to monitor.

## 2023-10-25 NOTE — ASSESSMENT & PLAN NOTE
HbA1c 6.3  Continue Lantus qam, to be given 1 hr prior to breakfast per pt request  Also on SSI qac only, no qhs dose  Outpt meds include Tresiba 14 u qam and SSI

## 2023-10-25 NOTE — ASSESSMENT & PLAN NOTE
2/2 fall  S/P R hip hemiarthroplasty on 10/21/23 at [facility]  Wound care and pain control per Physiatry

## 2023-10-25 NOTE — H&P
Physical Medicine & Rehabilitation  History and Physical (H&P)  &     Post Admission Physician Evaluation (ARABELLA)       Date of Admission: 10/25/2023  Date of Service: 10/25/2023   Hieu Duong  RH28/02    [facility] Code to Support Admission: 0008.11 - Orthopaedic Disorders: Status Post Unilateral Hip Fracture  Etiologic Diagnosis: Closed right hip fracture, initial encounter ([facility])    _______________________________________________    Chief Complaint: Hip Pain    History of Present Illness:    Hieu Duong is a 67 yo male with PMH significant for history of stroke on anticoagulation, GERD, hyperlipidemia, type 1 diabetes mellitus, insomnia, BPH, hypertension who presented to [facility] after sustaining a fall found to have right femoral neck fracture and is s/p right hip hemiarthroplasty 10/21/2023 Dr. Hernandez.  His postoperative course was complicated by acute blood loss anemia for which she received a transfusion, his hemoglobin improved into the tens.  It was also complicated by orthostatic hypotension and his enalapril was held.    Patient was admitted to the acute rehab unit 10/25/2023.  On admission he reports that he is happy to be here.  His wife is at home on hospice.  Adult children who could be around if needed and he also has a good support system of friends.  His goal is to get as independent as possible so he can go home and spend time with his wife.    Review of Systems:     14 point ROS reviewed and negative except as stated above.      Past Medical History:  Past Medical History:   Diagnosis Date    Diabetes ([facility])     GI bleed     after taking blood thinner    Hyperlipidemia     Hypertension     Stroke (HCC)     Vision loss     peripheral right eye due to cva       Past Surgical History:  No past surgical history on file.    Family History:  Family History   Problem Relation Age of Onset    Diabetes Brother        Medications:  Current Facility-Administered Medications  Spoke with his mother  We will start methimazole 5 mg ZBID  I described the possible side effects  RTC in 6 weeks   Medication Dose    Respiratory Therapy Consult      Pharmacy Consult Request ...Pain Management Review 1 Each  1 Each    lidocaine (Lidoderm) 5 % 1 Patch  1 Patch    hydrALAZINE (Apresoline) tablet 25 mg  25 mg    senna-docusate (Pericolace Or Senokot S) 8.6-50 MG per tablet 2 Tablet  2 Tablet    And    polyethylene glycol/lytes (Miralax) PACKET 1 Packet  1 Packet    And    magnesium hydroxide (Milk Of Magnesia) suspension 30 mL  30 mL    And    bisacodyl (Dulcolax) suppository 10 mg  10 mg    acetaminophen (Tylenol) tablet 650 mg  650 mg    carboxymethylcellulose (Refresh Tears) 0.5 % ophthalmic drops 1 Drop  1 Drop    benzocaine-menthol (Cepacol) lozenge 1 Lozenge  1 Lozenge    mag hydrox-al hydrox-simeth (Maalox Plus Es Or Mylanta Ds) suspension 20 mL  20 mL    ondansetron (Zofran ODT) dispertab 4 mg  4 mg    Or    ondansetron (Zofran) syringe/vial injection 4 mg  4 mg    traZODone (Desyrel) tablet 50 mg  50 mg    sodium chloride (Ocean) 0.65 % nasal spray 2 Spray  2 Spray    escitalopram (Lexapro) tablet 10 mg  10 mg    melatonin tablet 3 mg  3 mg    oxyCODONE immediate-release (Roxicodone) tablet 5-10 mg  5-10 mg    atorvastatin (Lipitor) tablet 20 mg  20 mg    insulin lispro (HumaLOG,AdmeLOG) injection  4 Units    insulin regular (HumuLIN R,NovoLIN R) injection  1-6 Units    And    dextrose 50% (D50W) injection 25 g  25 g    omeprazole (PriLOSEC) capsule 40 mg  40 mg    [START ON 10/26/2023] insulin GLARGINE (Lantus,Semglee) injection  14 Units    warfarin (Coumadin) tablet 5 mg  5 mg       Allergies:  Tdap [dipth, acell pertus, tetanus]    Psychosocial History:  Living Site:  Home  Living With:  spouse  Caregiver's availability:   None - On hospice. Close network of family and friends  Number of stairs:  Unknown  Substance use history:    1 shot of scotch nightly, quit smoking 10 years ago.  Marijuana    Level of Function Prior to Disability:  Previously independent    Level of Function Prior to Admission  "to [facility]:  Minimal assistance for bed mobility, transfers, gait, balance    CURRENT LEVEL OF FUNCTION:   Same as level of function prior to admission to [facility]    Physical Examination:     VITAL SIGNS:   height is 1.702 m (5' 7\") and weight is 62.8 kg (138 lb 8 oz). His oral temperature is 37.2 °C (99 °F). His blood pressure is 121/68 and his pulse is 77. His respiration is 15 and oxygen saturation is 95%.   GENERAL: No apparent distress  HEENT: Normocephalic/atraumatic and EOMI  CARDIAC: Regular rate and rhythm  LUNGS: Clear to auscultation   ABDOMINAL: soft and nontender    EXTREMITIES: no spasticity or no edema. Multiple areas of ecchymosis   NEURO:  Mental status:  A&Ox4 (person, place, date, situation) answers questions appropriately follows commands  Speech: fluent, no aphasia or dysarthria    Motor Exam Upper Extremities   ? Myotome R L   Shoulder Abduction C5 5 5   Elbow flexion C5 5 5   Wrist extension C6 5 5   Elbow extension C7 5 5   Finger flexion C8 5 5   Finger abduction T1 5 5     Motor Exam Lower Extremities  ? Myotome R L   Hip flexion L2 2* 5   Knee extension L3 2* 5   Ankle dorsiflexion L4 5 5   Toe extension L5 5 5   Ankle plantarflexion S1 5 5   *pain limiting   Sensation intact to light touch bilateral lower extremities    Radiology:  CT pelvis 10/20/2023  Impression      1.  Minimally displaced right subcapital femoral neck fracture.   2.  Severe degenerative changes right hip   3.  Nonspecific lucent lesions bilateral posterior iliac bones, likely a   subchondral cyst on the right and favored to be benign on the left although   recommend comparison with prior imaging. If prior imaging not available,   recommend MR of the pelvis     Laboratory Values:  Recent Labs     10/23/23  0738 10/24/23  0805   SODIUM 134* 136   POTASSIUM 4.4 4.0   CHLORIDE 101* 102   CO2 26 30   GLUCOSE 279* 172*   BUN 34* 24*   CREATININE 0.86 0.62*   CALCIUM 8.4* 7.7* "     Recent Labs     10/23/23  0738 10/23/23  1211 10/24/23  0805 10/25/23  0729   RBC 2.63*  --  2.21* 3.25*   HEMOGLOBIN 8.6* 7.9* 7.1* 10.4*   HEMATOCRIT 24.3* 22.9* 20.3* 29.6*   MCV 92.1  --  91.7 91.0   MCH 32.8  --  31.9 32.0   MCHC 35.6  --  34.8 35.2   RDW 12.4  --  12.8 13.6   PLATELETCT 181  --  191 208   MPV 9.1  --  8.2 8.1           Primary Rehabilitation Diagnosis:    This patient is a 68 y.o. male admitted for acute inpatient rehabilitation with Closed right hip fracture, initial encounter ([facility]).    Impairments:   ADLs/IADLs  Mobility    Secondary Diagnosis/Medical Co-morbidities Affecting Function  Acute blood loss anemia, orthostatic hypotension, type 1 diabetes mellitus, hypertension, postoperative pain    Relevant Changes Since Preadmission Evaluation:    Status unchanged    The patient's rehabilitation potential is Very Good  The patient's medical prognosis is good    Rehabilitation Plan:   Discussion and Recommendations:   1. The patient requires an acute inpatient rehabilitation program with a coordinated program of care at an intensity and frequency not available at a lower level of care. This recommendation is substantiated by the patient's medical physicians who recommend that the patient's intervention and assessment of medical issues needs to be done at an acute level of care for patient's safety and maximum outcome.   2. A coordinated program of care will be supplied by an interdisciplinary team of physical therapy, occupational therapy, rehab physician, rehab nursing, and, if needed, speech therapy and rehab psychology. Rehab team presents a patient-specific rehabilitation and education program concentrating on prevention of future problems related to accessibility, mobility, skin, bowel, bladder, sexuality, and psychosocial and medical/surgical problems.   3. Need for Rehabilitation Physician: The rehab physician will be evaluating the patient on a multi-weekly basis to help coordinate  the program of care. The rehab physician communicates between medical physicians, therapists, and nurses to maximize the patient's potential outcome. Specific areas in which the rehab physician will be providing daily assessment include the following:   A. Assessing the patient's heart rate and blood pressure response (vitals monitoring) to activity and making adjustments in medications or conservative measures as needed.   B. The rehab physician will be assessing the frequency at which the program can be increased to allow the patient to reach optimal functional outcome.   C. The rehab physician will also provide assessments in daily skin care, especially in light of patient's impairments in mobility.   D. The rehab physician will provide special expertise in understanding how to work with functional impairment and recommend appropriate interventions, compensatory techniques, and education that will facilitate the patient's outcome.   4. Rehab R.N.   The rehab RN will be working with patient to carry over in room mobility and activities of daily living when the patient is not in 3 hours of skilled therapy. Rehab nursing will be working in conjunction with rehab physician to address all the medical issues above and continue to assess laboratory work and discuss abnormalities with the treating physicians, assess vitals, and response to activity, and discuss and report abnormalities with the rehab physician. Rehab RN will also continue daily skin care, supervise bladder/bowel program, instruct in medication administration, and ensure patient safety.   5. Rehab Therapy: Therapies to treat at intensity and frequency of (may change after completion of evaluation by all therapeutic disciplines):       PT:  Physical therapy to address mobility, transfer, gait training and evaluation for adaptive equipment needs 1.5 hour/day at least 5 days/week for the duration of the ELOS (see below)       OT:  Occupational therapy to  address ADLs, self-care, home management training, functional mobility/transfers and assistive device evaluation, and community re-integration 1.5 hour/day at least 5 days/week for the duration of the ELOS (see below).        ST/Dysphagia:  Speech therapy to address speech, language, and cognitive deficits as well as swallowing difficulties with retraining/dysphagia management and community re-integration with comprehension, expression, cognitive training 0 hour/day at least 5 days/week for the duration of the ELOS (see below).     Medical management / Rehabilitation Issues/ Adverse Potential as part of rehabilitation plan     Rehabilitation Issues/Adverse Potential  1.  Right hip fracture: Patient demonstrates functional deficits in strength, balance, coordination, and ADL's. Patient is admitted to [facility] for comprehensive rehabilitation therapy as described below.   Rehabilitation nursing monitors bowel and bladder control, educates on medication administration, co-morbidities and monitors patient safety.    2.  Neurostimulants: None at this time but continue to assess daily for need to initiate should status change.    3.  DVT prophylaxis:  Patient is on warfarin for history of stroke for anticoagulation upon transfer. Encourage OOB. Monitor daily for signs and symptoms of DVT including but not limited to swelling and pain to prevent the development of DVT that may interfere with therapies.    4.  GI prophylaxis:  On prilosec to prevent gastritis/dyspepsia which may interfere with therapies.    5.  Pain: Controlled with Tylenol and oxycodone    6.  Nutrition/Dysphagia: Dietician monitors nutrient intake, recommend supplements prn and provide nutrition education to pt/family to promote optimal nutrition for wound healing/recovery.     7.  Bladder/bowel:  Start bowel and bladder program as described below, to prevent constipation, urinary retention (which may lead to UTI), and urinary  incontinence (which will impact upon pt's functional independence).   - TV Q3h while awake with post void bladder scans, I&O cath for PVRs >400  - up to commode after meal     8.  Skin/dermal ulcer prophylaxis: Monitor for new skin conditions with q.2 h. turns as required to prevent the development of skin breakdown.     9.  Cognition/Behavior: As needed psychologist provides adjustment counseling to illness and psychosocial barriers that may be potential barriers to rehabilitation.     10. Respiratory therapy: RT performs O2 management prn, breathing retraining, pulmonary hygiene and bronchospasm management prn to optimize participation in therapies.     Medical Co-Morbidities/Adverse Potential Affecting Function:    Right femoral neck fracture s/p right hip hemiarthroplasty 10/21/2023 Dr. Hernandez  PT and OT for mobility and ADLs. Per guidelines, 15 hours per week between PT, OT and/or SLP.  Follow-up orthopedics  Stable/sutures out 14 days postoperatively  Weightbearing as tolerated right lower extremity    Pain  Scheduled Tylenol  As needed oxycodone and Tylenol  Lidocaine patches as needed    Acute blood loss anemia  Required transfusion 10/24  A.m. labs    H/o GI bleed on ASA and Plavix ~2014  Monitor H/H    Hypertension  Orthostatic hypotension  Enalapril on hold since Clif Garcia    Type 1 diabetes mellitus with hyperglycemia  Restart insulin -at home he had been on Lyumjev KwikPen 4 units 3 times daily with meals and sliding scale + Tresiba 14 units once daily  Consult hospitalist    History of GERD  Continue PPI    History of stroke  Right eye vision loss   mild aphasia  Being treated as if cardioembolic though loop recorder has never shown arrhythmia  Continue warfarin  INR goal 2-3    Skin  Patient at risk for skin breakdown due to debility in areas including sacrum, achilles, elbows and head in addition to other sites. Nursing to assess skin daily.     Bowel   Patient on Senna-docusate for  constipation prophylaxis.     Bladder  History of BPH  Monitor PVRs and bladder scans  Timed voids  Had been on sildenafil    Insomnia  Had been on Ambien 10 mg  Order as needed    Avoid polypharmacy -record review and history indicate that patient had taken Ambien at night before his fall    DVT PROPHYLAXIS: Warfarin (history of stroke)    HOSPITALIST FOLLOWING: YES - d/w hospitalist 10/25    CODE STATUS: FULL CODE d/w patient 10/25/2023     DISPO: ANDREA TBD. Home with wife who is on hospice.     M2B ELIGIBLE: TBD    DISCHARGE FOLLOW UP: Orthopedics (NEHA Hernandez)    I personally performed a complete drug regimen review and no potential clinically significant medication issues were identified.     Goals/Expected Level of Function Based on Current Medical and Functional Status:  (may change based on patient's medical status and rate of impairment recovery)  Transfers:   Modified Independent  Mobility/Gait:   Modified Independent  ADL's:   Modified Independent    DISPOSITION: Discharge to pre-morbid independent living setting with the supportive care of patient's family, caregiver(s), and community resources.    ELOS: 7-10 days  ____________________________________    Dr. Montserrat Farnsworth DO, MS  [facility] - Physical Medicine & Rehabilitation   ____________________________________    Pt was seen today for 75 min, and entire time spent in face-to-face contact was >50% in counseling and coordination of care as detailed in A/P above.

## 2023-10-25 NOTE — PROGRESS NOTES
4 Eyes Skin Assessment Completed by Deidar WORTHINGTON RN and Payton RN.    Head WDL  Ears WDL  Nose WDL  Mouth WDL  Neck WDL  Breast/Chest WDL  Shoulder Blades WDL  Spine WDL  (R) Arm/Elbow/Hand Redness, Bruising, and Abrasion  (L) Arm/Elbow/Hand Bruising  Abdomen WDL  Groin WDL  Scrotum/Coccyx/Buttocks WDL  (R) Leg Bruising, Weeping, Edema, and Incision  (L) Leg WDL  (R) Heel/Foot/Toe WDL  (L) Heel/Foot/Toe WDL          Devices In Places       Interventions In Place Waffle Overlay and Pillows    Possible Skin Injury No    Pictures Uploaded Into Epic Yes  Wound Consult Placed N/A  RN Wound Prevention Protocol Ordered No

## 2023-10-26 ENCOUNTER — APPOINTMENT (OUTPATIENT)
Dept: PHYSICAL THERAPY | Facility: REHABILITATION | Age: 68
DRG: 560 | End: 2023-10-26
Attending: PHYSICAL MEDICINE & REHABILITATION
Payer: MEDICARE

## 2023-10-26 ENCOUNTER — APPOINTMENT (OUTPATIENT)
Dept: OCCUPATIONAL THERAPY | Facility: REHABILITATION | Age: 68
DRG: 560 | End: 2023-10-26
Attending: PHYSICAL MEDICINE & REHABILITATION
Payer: MEDICARE

## 2023-10-26 LAB
ALBUMIN SERPL BCP-MCNC: 2.8 G/DL (ref 3.2–4.9)
ALBUMIN/GLOB SERPL: 1 G/DL
ALP SERPL-CCNC: 74 U/L (ref 30–99)
ALT SERPL-CCNC: 27 U/L (ref 2–50)
ANION GAP SERPL CALC-SCNC: 8 MMOL/L (ref 7–16)
APPEARANCE UR: CLEAR
AST SERPL-CCNC: 31 U/L (ref 12–45)
BASOPHILS # BLD AUTO: 0.6 % (ref 0–1.8)
BASOPHILS # BLD: 0.04 K/UL (ref 0–0.12)
BILIRUB SERPL-MCNC: 1.6 MG/DL (ref 0.1–1.5)
BILIRUB UR QL STRIP.AUTO: NEGATIVE
BUN SERPL-MCNC: 18 MG/DL (ref 8–22)
CALCIUM ALBUM COR SERPL-MCNC: 9.2 MG/DL (ref 8.5–10.5)
CALCIUM SERPL-MCNC: 8.2 MG/DL (ref 8.5–10.5)
CHLORIDE SERPL-SCNC: 97 MMOL/L (ref 96–112)
CO2 SERPL-SCNC: 29 MMOL/L (ref 20–33)
COLOR UR: YELLOW
CREAT SERPL-MCNC: 0.54 MG/DL (ref 0.5–1.4)
EOSINOPHIL # BLD AUTO: 0.25 K/UL (ref 0–0.51)
EOSINOPHIL NFR BLD: 3.9 % (ref 0–6.9)
ERYTHROCYTE [DISTWIDTH] IN BLOOD BY AUTOMATED COUNT: 45.6 FL (ref 35.9–50)
EST. AVERAGE GLUCOSE BLD GHB EST-MCNC: 134 MG/DL
GFR SERPLBLD CREATININE-BSD FMLA CKD-EPI: 108 ML/MIN/1.73 M 2
GLOBULIN SER CALC-MCNC: 2.9 G/DL (ref 1.9–3.5)
GLUCOSE BLD STRIP.AUTO-MCNC: 117 MG/DL (ref 65–99)
GLUCOSE BLD STRIP.AUTO-MCNC: 202 MG/DL (ref 65–99)
GLUCOSE BLD STRIP.AUTO-MCNC: 257 MG/DL (ref 65–99)
GLUCOSE BLD STRIP.AUTO-MCNC: 265 MG/DL (ref 65–99)
GLUCOSE SERPL-MCNC: 222 MG/DL (ref 65–99)
GLUCOSE UR STRIP.AUTO-MCNC: >=1000 MG/DL
HBA1C MFR BLD: 6.3 % (ref 4–5.6)
HCT VFR BLD AUTO: 33.2 % (ref 42–52)
HGB BLD-MCNC: 11.2 G/DL (ref 14–18)
IMM GRANULOCYTES # BLD AUTO: 0.02 K/UL (ref 0–0.11)
IMM GRANULOCYTES NFR BLD AUTO: 0.3 % (ref 0–0.9)
IRON SATN MFR SERPL: 16 % (ref 15–55)
IRON SERPL-MCNC: 23 UG/DL (ref 50–180)
KETONES UR STRIP.AUTO-MCNC: 15 MG/DL
LEUKOCYTE ESTERASE UR QL STRIP.AUTO: NEGATIVE
LYMPHOCYTES # BLD AUTO: 1.08 K/UL (ref 1–4.8)
LYMPHOCYTES NFR BLD: 16.8 % (ref 22–41)
MAGNESIUM SERPL-MCNC: 1.7 MG/DL (ref 1.5–2.5)
MCH RBC QN AUTO: 31.5 PG (ref 27–33)
MCHC RBC AUTO-ENTMCNC: 33.7 G/DL (ref 32.3–36.5)
MCV RBC AUTO: 93.5 FL (ref 81.4–97.8)
MICRO URNS: ABNORMAL
MONOCYTES # BLD AUTO: 0.63 K/UL (ref 0–0.85)
MONOCYTES NFR BLD AUTO: 9.8 % (ref 0–13.4)
NEUTROPHILS # BLD AUTO: 4.4 K/UL (ref 1.82–7.42)
NEUTROPHILS NFR BLD: 68.6 % (ref 44–72)
NITRITE UR QL STRIP.AUTO: NEGATIVE
NRBC # BLD AUTO: 0 K/UL
NRBC BLD-RTO: 0 /100 WBC (ref 0–0.2)
PH UR STRIP.AUTO: 6.5 [PH] (ref 5–8)
PHOSPHATE SERPL-MCNC: 2.9 MG/DL (ref 2.5–4.5)
PLATELET # BLD AUTO: 278 K/UL (ref 164–446)
PMV BLD AUTO: 9.8 FL (ref 9–12.9)
POTASSIUM SERPL-SCNC: 4.5 MMOL/L (ref 3.6–5.5)
PROT SERPL-MCNC: 5.7 G/DL (ref 6–8.2)
PROT UR QL STRIP: NEGATIVE MG/DL
RBC # BLD AUTO: 3.55 M/UL (ref 4.7–6.1)
RBC UR QL AUTO: NEGATIVE
SODIUM SERPL-SCNC: 134 MMOL/L (ref 135–145)
SP GR UR STRIP.AUTO: 1.04
TIBC SERPL-MCNC: 146 UG/DL (ref 250–450)
TSH SERPL DL<=0.005 MIU/L-ACNC: 1.86 UIU/ML (ref 0.38–5.33)
UIBC SERPL-MCNC: 123 UG/DL (ref 110–370)
UROBILINOGEN UR STRIP.AUTO-MCNC: 1 MG/DL
WBC # BLD AUTO: 6.4 K/UL (ref 4.8–10.8)

## 2023-10-26 PROCEDURE — 84443 ASSAY THYROID STIM HORMONE: CPT

## 2023-10-26 PROCEDURE — 83735 ASSAY OF MAGNESIUM: CPT

## 2023-10-26 PROCEDURE — 97116 GAIT TRAINING THERAPY: CPT

## 2023-10-26 PROCEDURE — 700102 HCHG RX REV CODE 250 W/ 637 OVERRIDE(OP): Performed by: HOSPITALIST

## 2023-10-26 PROCEDURE — 83540 ASSAY OF IRON: CPT

## 2023-10-26 PROCEDURE — 99232 SBSQ HOSP IP/OBS MODERATE 35: CPT | Performed by: PHYSICAL MEDICINE & REHABILITATION

## 2023-10-26 PROCEDURE — 97166 OT EVAL MOD COMPLEX 45 MIN: CPT

## 2023-10-26 PROCEDURE — 99232 SBSQ HOSP IP/OBS MODERATE 35: CPT | Performed by: HOSPITALIST

## 2023-10-26 PROCEDURE — A9270 NON-COVERED ITEM OR SERVICE: HCPCS | Performed by: PHYSICAL MEDICINE & REHABILITATION

## 2023-10-26 PROCEDURE — 97110 THERAPEUTIC EXERCISES: CPT

## 2023-10-26 PROCEDURE — 80053 COMPREHEN METABOLIC PANEL: CPT

## 2023-10-26 PROCEDURE — 700102 HCHG RX REV CODE 250 W/ 637 OVERRIDE(OP): Performed by: PHYSICAL MEDICINE & REHABILITATION

## 2023-10-26 PROCEDURE — 36415 COLL VENOUS BLD VENIPUNCTURE: CPT

## 2023-10-26 PROCEDURE — 97162 PT EVAL MOD COMPLEX 30 MIN: CPT

## 2023-10-26 PROCEDURE — 770010 HCHG ROOM/CARE - REHAB SEMI PRIVAT*

## 2023-10-26 PROCEDURE — 83550 IRON BINDING TEST: CPT

## 2023-10-26 PROCEDURE — 81003 URINALYSIS AUTO W/O SCOPE: CPT

## 2023-10-26 PROCEDURE — 82962 GLUCOSE BLOOD TEST: CPT

## 2023-10-26 PROCEDURE — 97535 SELF CARE MNGMENT TRAINING: CPT

## 2023-10-26 PROCEDURE — A9270 NON-COVERED ITEM OR SERVICE: HCPCS | Performed by: HOSPITALIST

## 2023-10-26 PROCEDURE — 85025 COMPLETE CBC W/AUTO DIFF WBC: CPT

## 2023-10-26 PROCEDURE — 84100 ASSAY OF PHOSPHORUS: CPT

## 2023-10-26 PROCEDURE — 83036 HEMOGLOBIN GLYCOSYLATED A1C: CPT

## 2023-10-26 RX ORDER — NITROFURANTOIN 25; 75 MG/1; MG/1
100 CAPSULE ORAL 2 TIMES DAILY WITH MEALS
Status: DISCONTINUED | OUTPATIENT
Start: 2023-10-26 | End: 2023-10-27

## 2023-10-26 RX ORDER — FERROUS GLUCONATE 324(38)MG
324 TABLET ORAL
Status: DISCONTINUED | OUTPATIENT
Start: 2023-10-26 | End: 2023-10-31 | Stop reason: HOSPADM

## 2023-10-26 RX ORDER — ASCORBIC ACID 500 MG
500 TABLET ORAL
Status: DISCONTINUED | OUTPATIENT
Start: 2023-10-26 | End: 2023-10-31 | Stop reason: HOSPADM

## 2023-10-26 RX ADMIN — DIBASIC SODIUM PHOSPHATE, MONOBASIC POTASSIUM PHOSPHATE AND MONOBASIC SODIUM PHOSPHATE 250 MG: 852; 155; 130 TABLET ORAL at 14:23

## 2023-10-26 RX ADMIN — ACETAMINOPHEN 500 MG: 500 TABLET ORAL at 14:23

## 2023-10-26 RX ADMIN — OXYCODONE HYDROCHLORIDE 10 MG: 5 TABLET ORAL at 01:58

## 2023-10-26 RX ADMIN — SENNOSIDES AND DOCUSATE SODIUM 2 TABLET: 50; 8.6 TABLET ORAL at 08:33

## 2023-10-26 RX ADMIN — INSULIN GLARGINE-YFGN 14 UNITS: 100 INJECTION, SOLUTION SUBCUTANEOUS at 08:31

## 2023-10-26 RX ADMIN — OXYCODONE HYDROCHLORIDE AND ACETAMINOPHEN 500 MG: 500 TABLET ORAL at 10:16

## 2023-10-26 RX ADMIN — ZOLPIDEM TARTRATE 5 MG: 5 TABLET ORAL at 22:08

## 2023-10-26 RX ADMIN — ATORVASTATIN CALCIUM 20 MG: 10 TABLET, FILM COATED ORAL at 19:42

## 2023-10-26 RX ADMIN — WARFARIN SODIUM 2.5 MG: 2.5 TABLET ORAL at 17:46

## 2023-10-26 RX ADMIN — OMEPRAZOLE 40 MG: 20 CAPSULE, DELAYED RELEASE ORAL at 08:33

## 2023-10-26 RX ADMIN — NITROFURANTOIN MONOHYDRATE/MACROCRYSTALS 100 MG: 75; 25 CAPSULE ORAL at 17:48

## 2023-10-26 RX ADMIN — ACETAMINOPHEN 500 MG: 500 TABLET ORAL at 05:51

## 2023-10-26 RX ADMIN — DIBASIC SODIUM PHOSPHATE, MONOBASIC POTASSIUM PHOSPHATE AND MONOBASIC SODIUM PHOSPHATE 250 MG: 852; 155; 130 TABLET ORAL at 08:33

## 2023-10-26 RX ADMIN — ESCITALOPRAM OXALATE 10 MG: 10 TABLET ORAL at 19:42

## 2023-10-26 RX ADMIN — NITROFURANTOIN MONOHYDRATE/MACROCRYSTALS 100 MG: 75; 25 CAPSULE ORAL at 11:45

## 2023-10-26 RX ADMIN — OXYCODONE HYDROCHLORIDE 5 MG: 5 TABLET ORAL at 19:42

## 2023-10-26 RX ADMIN — DIBASIC SODIUM PHOSPHATE, MONOBASIC POTASSIUM PHOSPHATE AND MONOBASIC SODIUM PHOSPHATE 250 MG: 852; 155; 130 TABLET ORAL at 19:42

## 2023-10-26 RX ADMIN — ACETAMINOPHEN 500 MG: 500 TABLET ORAL at 17:45

## 2023-10-26 RX ADMIN — Medication 3 MG: at 19:42

## 2023-10-26 RX ADMIN — Medication 324 MG: at 10:15

## 2023-10-26 RX ADMIN — ACETAMINOPHEN 500 MG: 500 TABLET ORAL at 10:15

## 2023-10-26 SDOH — ECONOMIC STABILITY: TRANSPORTATION INSECURITY
IN THE PAST 12 MONTHS, HAS THE LACK OF TRANSPORTATION KEPT YOU FROM MEDICAL APPOINTMENTS OR FROM GETTING MEDICATIONS?: NO

## 2023-10-26 SDOH — ECONOMIC STABILITY: TRANSPORTATION INSECURITY
IN THE PAST 12 MONTHS, HAS LACK OF RELIABLE TRANSPORTATION KEPT YOU FROM MEDICAL APPOINTMENTS, MEETINGS, WORK OR FROM GETTING THINGS NEEDED FOR DAILY LIVING?: NO

## 2023-10-26 ASSESSMENT — GAIT ASSESSMENTS
DISTANCE (FEET): 40
DISTANCE (FEET): 150
ASSISTIVE DEVICE: FRONT WHEEL WALKER
DEVIATION: ANTALGIC;BRADYKINETIC;DECREASED HEEL STRIKE;DECREASED TOE OFF
DEVIATION: ANTALGIC;BRADYKINETIC
GAIT LEVEL OF ASSIST: CONTACT GUARD ASSIST
GAIT LEVEL OF ASSIST: CONTACT GUARD ASSIST
ASSISTIVE DEVICE: FRONT WHEEL WALKER

## 2023-10-26 ASSESSMENT — ENCOUNTER SYMPTOMS
PALPITATIONS: 0
COUGH: 0
ABDOMINAL PAIN: 0
CHILLS: 0
BRUISES/BLEEDS EASILY: 0
SHORTNESS OF BREATH: 0
VOMITING: 0
POLYDIPSIA: 0
NAUSEA: 0
EYES NEGATIVE: 1
FEVER: 0

## 2023-10-26 ASSESSMENT — BRIEF INTERVIEW FOR MENTAL STATUS (BIMS)
WHAT DAY OF THE WEEK IS IT: CORRECT
WHAT YEAR IS IT: CORRECT
ASKED TO RECALL SOCK: YES, NO CUE REQUIRED
INITIAL REPETITION OF BED BLUE SOCK - FIRST ATTEMPT: 3
ASKED TO RECALL BLUE: YES, NO CUE REQUIRED
BIMS SUMMARY SCORE: 13
ASKED TO RECALL BED: NO, COULD NOT RECALL
WHAT MONTH IS IT: ACCURATE WITHIN 5 DAYS

## 2023-10-26 ASSESSMENT — ACTIVITIES OF DAILY LIVING (ADL)
TOILETING: INDEPENDENT
BED_CHAIR_WHEELCHAIR_TRANSFER_DESCRIPTION: ADAPTIVE EQUIPMENT;INCREASED TIME;SUPERVISION FOR SAFETY;VERBAL CUEING
TOILET_TRANSFER_DESCRIPTION: ADAPTIVE EQUIPMENT;SUPERVISION FOR SAFETY;VERBAL CUEING

## 2023-10-26 ASSESSMENT — PAIN DESCRIPTION - PAIN TYPE
TYPE: ACUTE PAIN
TYPE: ACUTE PAIN

## 2023-10-26 ASSESSMENT — FIBROSIS 4 INDEX: FIB4 SCORE: 1.42

## 2023-10-26 NOTE — PROGRESS NOTES
..                                                         NURSING DAILY NOTE    Name: Hieu Duong   Date of Admission: 10/25/2023   Admitting Diagnosis: Closed right hip fracture, initial encounter ([facility])  Attending Physician: Montserrat Farnsworth D.o.  Allergies: Tdap [dipth, acell pertus, tetanus]    Safety  Patient Assist     Patient Precautions     Precaution Comments     Bed Transfer Status     Toilet Transfer Status      Assistive Devices  Rails, Wheelchair, Gait Belt  Oxygen  None - Room Air  Diet/Therapeutic Dining  Current Diet Order   Procedures    Diet Order Diet: Consistent CHO (Diabetic)     Pill Administration  whole  Agitated Behavioral Scale     ABS Level of Severity       Fall Risk  Has the patient had a fall this admission?   No  Nancy Tracey Fall Risk Scoring  16, HIGH RISK  Fall Risk Safety Measures  bed alarm, chair alarm, seatbelt alarm, poor balance, and low vision/ hearing    Vitals  Temperature: 36.6 °C (97.8 °F)  Temp src: Oral  Pulse: 85  Respiration: 18  Blood Pressure : 127/65  Blood Pressure MAP (Calculated): 86 MM HG  BP Location: Left, Upper Arm  Patient BP Position: Supine     Oxygen  Pulse Oximetry: 96 %  O2 (LPM): 0  O2 Delivery Device: None - Room Air    Bowel and Bladder  Last Bowel Movement  10/18/23 (per patient)  Stool Type     Bowel Device     Continent  Bladder: Did not void   Bowel: No movement  Bladder Function  Urine Void (mL): 250 ml  Number of Times Voided: 1  Urine Color: Yellow  Number of Times Incontinent of Urine: 0  Genitourinary Assessment   Bladder Assessment (WDL):  WDL Except  Hagan Catheter: Not Applicable  Urine Color: Yellow  Number of Bladder Accidents: 0  Total Number of Bladder of Accidents in Last 7 Days: 0  Number of Times Incontinent of Urine: 0  Bladder Device: Urinal  Time Void: Yes  Bladder Scan: Post Void  $ Bladder Scan Results (mL): 231    Skin  West Score   19  Sensory Interventions   Bed Types: Standard/Trauma Mattress  Skin  Preventative Measures: Waffle Overlay, Pillows in Use for Support / Positioning  Moisture Interventions         Pain  Pain Rating Scale  7 - Focus of attention, prevents doing daily activities  Pain Location  Hip  Pain Location Orientation  Right  Pain Interventions   Medication (see MAR)    ADLs    Bathing      Linen Change      Personal Hygiene     Chlorhexidine Bath      Oral Care     Teeth/Dentures     Shave     Nutrition Percentage Eaten  Dinner, Between % Consumed  Environmental Precautions     Patient Turns/Positioning     Patient Turns Assistance/Tolerance     Bed Positions     Head of Bed Elevated         Psychosocial/Neurologic Assessment  Psychosocial Assessment  Psychosocial (WDL):  Within Defined Limits  Neurologic Assessment  Neuro (WDL): Exceptions to WDL  Level of Consciousness: Alert  Orientation Level: Oriented X4  Cognition: Appropriate judgement, Follows commands  Motor Function/Sensation Assessment: Sensation  RUE Sensation: Full sensation  LUE Sensation: Full sensation  RLE Sensation: Tingling (foot)  LLE Sensation: Full sensation  EENT (WDL):  WDL Except    Cardio/Pulmonary Assessment  Edema      Respiratory Breath Sounds     Cardiac Assessment   Cardiac (WDL):  Within Defined Limits

## 2023-10-26 NOTE — CARE PLAN
Problem: Bowel Elimination  Goal: Patient will participate in bowel management program  Outcome: Progressing  Moderate BM after MOM last night

## 2023-10-26 NOTE — DISCHARGE PLANNING
CASE MANAGEMENT INITIAL ASSESSMENT    Admit Date:  10/25/2023     Case Management reviewed the medical chart and will meet with patient to discuss role of case management / discharge planning / team conference.   Patient is a  68 y.o. male transferred from [facility].    Attending physician: Dr. Farnsworth  PCP: Marcin Bonds MD  Orthopedic: Dr. Hernandez     Diagnosis: Closed right hip fracture, initial encounter ([facility]) [S72.001A]    Co-morbidities:   Patient Active Problem List    Diagnosis Date Noted    Closed right hip fracture, initial encounter ([facility]) 10/25/2023    Azotemia 10/25/2023    Anemia 10/25/2023    Depression 10/25/2023    Hypophosphatemia 10/25/2023    Type 1 diabetes mellitus without complication ([facility]) 07/12/2021    Late effect of stroke 04/12/2021    Benign prostatic hyperplasia without lower urinary tract symptoms 01/04/2021    Chronic anticoagulation 01/04/2021    Essential hypertension 01/04/2021    Mixed hyperlipidemia 01/04/2021    Other insomnia 01/04/2021    IDDM (insulin dependent diabetes mellitus) 06/19/2019    History of CVA (cerebrovascular accident) 06/19/2019    History of GI bleed 06/19/2019    Vision loss      Prior Living Situation:  Housing / Facility: [address]  Lives with - Patient's Self Care Capacity: Spouse    Support Systems:  Primary : Allie Duong (spouse)     Previous Services Utilized:   Equipment Owned: None  Prior Services: Home-Independent    Other Information:  Primary Payor Source: Medicare A, Medicare B  Secondary Payor Source: Other (Comments) (aarp)  Primary Care Practitioner : Dr. Marcin Bonds  Other MDs: Dr. Hernandez    Patient / Family Goal:  Patient / Family Goal:  (cm to discuss goals with patient)    Plan:  1. Continue to follow patient through hospitalization and provide discharge planning in collaboration with patient, family, physicians and ancillary services.     2. Utilize community resources to ensure a safe discharge.

## 2023-10-26 NOTE — PROGRESS NOTES
"  Physical Medicine & Rehabilitation Progress Note    Encounter Date: 10/26/2023    Chief Complaint: Horrible night    Interval Events (Subjective):  VSS  Voiding small amounts  Has not had BM    Seen and examined in his room. States he had a bad night because he has painful urination. He also has not had a BM and would really like to. Otherwise he is doing ok and was up with therapy today already.       ROS: 14 point ROS negative unless otherwise specified in the HPI    Objective:  VITAL SIGNS: /66   Pulse 83   Temp 36.8 °C (98.3 °F) (Oral)   Resp 16   Ht 1.702 m (5' 7\")   Wt 59.6 kg (131 lb 8 oz)   SpO2 97%   BMI 20.60 kg/m²     GEN: No apparent distress  HEENT: Head normocephalic, atraumatic.  Sclera nonicteric bilaterally, no ocular discharge appreciated bilaterally.  CV: Extremities warm and well-perfused, no peripheral edema appreciated bilaterally.  PULMONARY: Breathing nonlabored on room air, no respiratory accessory muscle use.  Not requiring supplemental oxygen.  ABD: Soft, nontender.  SKIN: No appreciable skin breakdown on exposed areas of skin.  PSYCH: Mood and affect within normal limits.  NEURO: Awake alert.  Conversational.  Logical thought content.      Laboratory Values:  Recent Results (from the past 72 hour(s))   Hemoglobin and Hematocrit    Collection Time: 10/23/23 12:11 PM   Result Value Ref Range    Hemoglobin 7.9 (L) 13.0 - 16.7 g/dL    Hematocrit 22.9 (L) 38.8 - 49.7 %   Phosphorus, Plasma or Serum    Collection Time: 10/24/23  8:05 AM   Result Value Ref Range    Phosphorus 1.9 (L) 2.4 - 4.7 mg/dL   BASIC METABOLIC PANEL    Collection Time: 10/24/23  8:05 AM   Result Value Ref Range    Sodium 136 136 - 144 mmol/L    Potassium 4.0 3.6 - 5.1 mmol/L    Chloride 102 102 - 110 mmol/L    Co2 30 22 - 32 mmol/L    Bun 24 (H) 8 - 20 mg/dL    Creatinine 0.62 (L) 0.80 - 1.40 mg/dL    Calcium 7.7 (L) 8.7 - 10.3 mg/dL    Anion Gap 4 2 - 11 mmol/L    Glom Filt Rate, Est 129 >60 mL/min/1.7    " Glucose 172 (H) 60 - 99 mg/dL   COMPLETE CBC & AUTO DIFF WBC    Collection Time: 10/24/23  8:05 AM   Result Value Ref Range    Leukocytes, Absolute 7.4 3.7 - 10.6 x10E3/uL    RBC 2.21 (L) 4.50 - 5.70 x10e6/uL    Hemoglobin 7.1 (L) 13.0 - 16.7 g/dL    Hematocrit 20.3 (LL) 38.8 - 49.7 %    MCV 91.7 83.0 - 99.0 fL    MCH 31.9 28.0 - 33.8 pg    MCHC 34.8 33.1 - 36.5 g/dL    RDW 12.8 11.8 - 14.0 %    Platelet Count 191 146 - 390 x10E3/uL    MPV 8.2 6.4 - 10.2 fL    Neutrophils-Polys 73.8 41.7 - 82.3 %    Lymphocytes 15.3 10.8 - 44.4 %    Monocytes 8.9 5.0 - 12.8 %    Eosinophils 1.6 0.0 - 6.6 %    Basophils 0.4 0.0 - 1.3 %    Neutrophils (Absolute) 5.50 1.40 - 8.00 x10E3/uL    Lymphs (Absolute) 1.10 1.00 - 5.20 x10E3/uL    Monos (Absolute) 0.70 0.16 - 1.00 x10E3/uL    Eos (Absolute) 0.10 0.00 - 0.80 x10E3/uL    Baso (Absolute) 0.00 0.00 - 0.30 x10E3/uL   COMPLETE CBC & AUTO DIFF WBC    Collection Time: 10/25/23  7:29 AM   Result Value Ref Range    Leukocytes, Absolute 7.2 3.7 - 10.6 x10E3/uL    RBC 3.25 (L) 4.50 - 5.70 x10e6/uL    Hemoglobin 10.4 (L) 13.0 - 16.7 g/dL    Hematocrit 29.6 (L) 38.8 - 49.7 %    MCV 91.0 83.0 - 99.0 fL    MCH 32.0 28.0 - 33.8 pg    MCHC 35.2 33.1 - 36.5 g/dL    RDW 13.6 11.8 - 14.0 %    Platelet Count 208 146 - 390 x10E3/uL    MPV 8.1 6.4 - 10.2 fL    Neutrophils-Polys 72.3 41.7 - 82.3 %    Lymphocytes 15.6 10.8 - 44.4 %    Monocytes 9.8 5.0 - 12.8 %    Eosinophils 1.9 0.0 - 6.6 %    Basophils 0.4 0.0 - 1.3 %    Neutrophils (Absolute) 5.20 1.40 - 8.00 x10E3/uL    Lymphs (Absolute) 1.10 1.00 - 5.20 x10E3/uL    Monos (Absolute) 0.70 0.16 - 1.00 x10E3/uL    Eos (Absolute) 0.10 0.00 - 0.80 x10E3/uL    Baso (Absolute) 0.00 0.00 - 0.30 x10E3/uL   POCT glucose device results    Collection Time: 10/25/23  2:53 PM   Result Value Ref Range    POC Glucose, Blood 153 (H) 65 - 99 mg/dL   POCT glucose device results    Collection Time: 10/25/23  5:00 PM   Result Value Ref Range    POC Glucose, Blood 125 (H)  65 - 99 mg/dL   POCT glucose device results    Collection Time: 10/25/23  8:47 PM   Result Value Ref Range    POC Glucose, Blood 191 (H) 65 - 99 mg/dL   Comp Metabolic Panel (CMP)    Collection Time: 10/26/23  5:59 AM   Result Value Ref Range    Sodium 134 (L) 135 - 145 mmol/L    Potassium 4.5 3.6 - 5.5 mmol/L    Chloride 97 96 - 112 mmol/L    Co2 29 20 - 33 mmol/L    Anion Gap 8.0 7.0 - 16.0    Glucose 222 (H) 65 - 99 mg/dL    Bun 18 8 - 22 mg/dL    Creatinine 0.54 0.50 - 1.40 mg/dL    Calcium 8.2 (L) 8.5 - 10.5 mg/dL    Correct Calcium 9.2 8.5 - 10.5 mg/dL    AST(SGOT) 31 12 - 45 U/L    ALT(SGPT) 27 2 - 50 U/L    Alkaline Phosphatase 74 30 - 99 U/L    Total Bilirubin 1.6 (H) 0.1 - 1.5 mg/dL    Albumin 2.8 (L) 3.2 - 4.9 g/dL    Total Protein 5.7 (L) 6.0 - 8.2 g/dL    Globulin 2.9 1.9 - 3.5 g/dL    A-G Ratio 1.0 g/dL   CBC with Differential    Collection Time: 10/26/23  5:59 AM   Result Value Ref Range    WBC 6.4 4.8 - 10.8 K/uL    RBC 3.55 (L) 4.70 - 6.10 M/uL    Hemoglobin 11.2 (L) 14.0 - 18.0 g/dL    Hematocrit 33.2 (L) 42.0 - 52.0 %    MCV 93.5 81.4 - 97.8 fL    MCH 31.5 27.0 - 33.0 pg    MCHC 33.7 32.3 - 36.5 g/dL    RDW 45.6 35.9 - 50.0 fL    Platelet Count 278 164 - 446 K/uL    MPV 9.8 9.0 - 12.9 fL    Neutrophils-Polys 68.60 44.00 - 72.00 %    Lymphocytes 16.80 (L) 22.00 - 41.00 %    Monocytes 9.80 0.00 - 13.40 %    Eosinophils 3.90 0.00 - 6.90 %    Basophils 0.60 0.00 - 1.80 %    Immature Granulocytes 0.30 0.00 - 0.90 %    Nucleated RBC 0.00 0.00 - 0.20 /100 WBC    Neutrophils (Absolute) 4.40 1.82 - 7.42 K/uL    Lymphs (Absolute) 1.08 1.00 - 4.80 K/uL    Monos (Absolute) 0.63 0.00 - 0.85 K/uL    Eos (Absolute) 0.25 0.00 - 0.51 K/uL    Baso (Absolute) 0.04 0.00 - 0.12 K/uL    Immature Granulocytes (abs) 0.02 0.00 - 0.11 K/uL    NRBC (Absolute) 0.00 K/uL   Magnesium    Collection Time: 10/26/23  5:59 AM   Result Value Ref Range    Magnesium 1.7 1.5 - 2.5 mg/dL   Phosphorus    Collection Time: 10/26/23  5:59  AM   Result Value Ref Range    Phosphorus 2.9 2.5 - 4.5 mg/dL   TSH with Reflex to FT4    Collection Time: 10/26/23  5:59 AM   Result Value Ref Range    TSH 1.860 0.380 - 5.330 uIU/mL   HEMOGLOBIN A1C    Collection Time: 10/26/23  5:59 AM   Result Value Ref Range    Glycohemoglobin 6.3 (H) 4.0 - 5.6 %    Est Avg Glucose 134 mg/dL   IRON/TOTAL IRON BIND    Collection Time: 10/26/23  5:59 AM   Result Value Ref Range    Iron 23 (L) 50 - 180 ug/dL    Total Iron Binding 146 (L) 250 - 450 ug/dL    Unsat Iron Binding 123 110 - 370 ug/dL    % Saturation 16 15 - 55 %   ESTIMATED GFR    Collection Time: 10/26/23  5:59 AM   Result Value Ref Range    GFR (CKD-EPI) 108 >60 mL/min/1.73 m 2   POCT glucose device results    Collection Time: 10/26/23  8:21 AM   Result Value Ref Range    POC Glucose, Blood 257 (H) 65 - 99 mg/dL       Medications:  Scheduled Medications   Medication Dose Frequency    [START ON 10/27/2023] insulin GLARGINE  16 Units DAILY    ferrous gluconate  324 mg QDAY with Breakfast    ascorbic acid  500 mg QDAY with Breakfast    nitrofurantoin  100 mg BID WITH MEALS    Pharmacy Consult Request  1 Each PHARMACY TO DOSE    senna-docusate  2 Tablet BID    escitalopram  10 mg QHS    atorvastatin  20 mg Q EVENING    omeprazole  40 mg DAILY    insulin regular  2-12 Units 4X/DAY ACHS    acetaminophen  500 mg 4X/DAY    MD Alert...Warfarin per Pharmacy   PHARMACY TO DOSE    warfarin  2.5 mg ONCE AT 1800    phosphorus  250 mg TID     PRN medications: Respiratory Therapy Consult, lidocaine, hydrALAZINE, senna-docusate **AND** polyethylene glycol/lytes **AND** magnesium hydroxide **AND** bisacodyl, acetaminophen, carboxymethylcellulose, benzocaine-menthol, mag hydrox-al hydrox-simeth, ondansetron **OR** ondansetron, sodium chloride, melatonin, oxyCODONE immediate-release, insulin regular **AND** POC blood glucose manual result **AND** NOTIFY MD and PharmD **AND** Administer 20 grams of glucose (approximately 8 ounces of fruit  juice) every 15 minutes PRN FSBG less than 70 mg/dL **AND** dextrose bolus, zolpidem    Diet:  Current Diet Order   Procedures    Diet Order Diet: Consistent CHO (Diabetic)       Medical Decision Making and Plan:  Right femoral neck fracture s/p right hip hemiarthroplasty 10/21/2023 Dr. Hernandez  PT and OT for mobility and ADLs. Per guidelines, 15 hours per week between PT, OT and/or SLP.  Follow-up orthopedics  Stable/sutures out 14 days postoperatively  Weightbearing as tolerated right lower extremity     Pain  Scheduled Tylenol  As needed oxycodone and Tylenol  Lidocaine patches as needed     Acute blood loss anemia  Required transfusion 10/24  Improved to 11.2 10/26    Hyponatremia  Mildly low 134  Monitor    Hypophosphatemia  Mildly low, on supplement     H/o GI bleed on ASA and Plavix ~2014  Monitor H/H     Hypertension  Orthostatic hypotension  Enalapril on hold since Clif Garcia  Remain on hold 10/26 BP stable     Type 1 diabetes mellitus with hyperglycemia  Restart insulin -at home he had been on Lyumjev KwikPen 4 units 3 times daily with meals and sliding scale + Tresiba 14 units once daily  Consult hospitalist  Hgb A1c 6.3     History of GERD  Continue PPI     History of stroke  Right eye vision loss   mild aphasia  Being treated as if cardioembolic though loop recorder has never shown arrhythmia  Continue warfarin  INR goal 2-3     Skin  Patient at risk for skin breakdown due to debility in areas including sacrum, achilles, elbows and head in addition to other sites. Nursing to assess skin daily.      Bowel   Patient on Senna-docusate for constipation prophylaxis.      Bladder  History of BPH  Dysuria  Monitor PVRs and bladder scans  Timed voids  Had been on sildenafil  UA 10/26  Start Macrobid after UA until Cx returns     Insomnia  Had been on Ambien 10 mg  Order as needed     Avoid polypharmacy -record review and history indicate that patient had taken Ambien at night before his fall     DVT  PROPHYLAXIS: Warfarin (history of stroke)     HOSPITALIST FOLLOWING: YES - d/w hospitalist 10/26     CODE STATUS: FULL CODE d/w patient 10/25/2023      DISPO: ANDREA TBD. Home with wife who is on hospice.      M2B ELIGIBLE: TBD     DISCHARGE FOLLOW UP: Orthopedics (NEHA Hernandez)  ____________________________________    Dr. Montserrat Farnsworth DO, MS  [facility] - Physical Medicine & Rehabilitation   ____________________________________

## 2023-10-26 NOTE — CARE PLAN
"The patient is Stable - Low risk of patient condition declining or worsening    Patient is not progressing towards the following goals:    Problem: Fall Risk - Rehab  Goal: Patient will remain free from falls  Outcome: Not Met  Note: Nancy Tracey Fall risk Assessment Score: 16    High fall risk Interventions   - Alarming seatbelt  - Bed and strip alarm   - Yellow sign by the door   - Yellow wrist band \"Fall risk\"  - Room near to the nurse station  - Do not leave patient unattended in the bathroom  - Fall risk education provided     "

## 2023-10-26 NOTE — PROGRESS NOTES
NURSING DAILY NOTE    Name: Hieu Duong   Date of Admission: 10/25/2023   Admitting Diagnosis: Closed right hip fracture, initial encounter ([facility])  Attending Physician: Montserrat Farnsworth D.o.  Allergies: Tdap [dipth, acell pertus, tetanus]    Safety  Patient Assist     Patient Precautions     Precaution Comments     Bed Transfer Status     Toilet Transfer Status      Assistive Devices     Oxygen     Diet/Therapeutic Dining  Current Diet Order   Procedures    Diet Order Diet: Consistent CHO (Diabetic)     Pill Administration  whole  Agitated Behavioral Scale     ABS Level of Severity       Fall Risk  Has the patient had a fall this admission?      Nancy Tracey Fall Risk Scoring  17, HIGH RISK  Fall Risk Safety Measures  bed alarm and chair alarm    Vitals  Temperature: 37.2 °C (99 °F)  Temp src: Oral  Pulse: 77  Respiration: 15  Blood Pressure : 121/68  Blood Pressure MAP (Calculated): 86 MM HG  BP Location: Left, Upper Arm  Patient BP Position: Supine     Oxygen  Pulse Oximetry: 95 %    Bowel and Bladder  Last Bowel Movement  10/22/23  Stool Type     Bowel Device     Continent  Bladder: Did not void   Bowel: No movement  Bladder Function  Urine Void (mL): 200 ml  Genitourinary Assessment   Hagan Catheter: Not Applicable  Time Void: Yes  $ Bladder Scan Results (mL): 274    Skin  West Score   17  Sensory Interventions   Bed Types: Standard/Trauma Mattress with Overlay  Skin Preventative Measures: Waffle Overlay, Pillows in Use for Support / Positioning  Moisture Interventions         Pain  Pain Rating Scale  7 - Focus of attention, prevents doing daily activities  Pain Location  Hip  Pain Location Orientation  Right  Pain Interventions        ADLs    Bathing      Linen Change      Personal Hygiene     Chlorhexidine Bath      Oral Care     Teeth/Dentures     Shave     Nutrition Percentage Eaten     Environmental Precautions     Patient  Turns/Positioning     Patient Turns Assistance/Tolerance     Bed Positions     Head of Bed Elevated         Psychosocial/Neurologic Assessment  Psychosocial Assessment     Neurologic Assessment  Neuro (WDL): Exceptions to WDL  Level of Consciousness: Alert  Orientation Level: Oriented X4  Cognition: Appropriate judgement, Follows commands  Motor Function/Sensation Assessment: Sensation  RUE Sensation: Full sensation  LUE Sensation: Full sensation  RLE Sensation: Tingling (foot)  LLE Sensation: Full sensation  EENT (WDL):  WDL Except    Cardio/Pulmonary Assessment  Edema      Respiratory Breath Sounds     Cardiac Assessment   Cardiac (WDL):  Within Defined Limits

## 2023-10-26 NOTE — THERAPY
Physical Therapy   Daily Treatment     Patient Name: Hieu Duong  Age:  68 y.o., Sex:  male  Medical Record #: [id number]  Today's Date: 10/26/2023     Precautions  Precautions: (P) Fall Risk, Weight Bearing As Tolerated Right Lower Extremity    Subjective    Patient agreeable to therapy; reports bowel urgency all day since given bowel medication this morning for constipation     Objective       10/26/23 1431   PT Charge Group   PT Gait Training (Units) 1   PT Therapeutic Exercise (Units) 1   PT Total Time Spent   PT Individual Total Time Spent (Mins) 30   Precautions   Precautions Fall Risk;Weight Bearing As Tolerated Right Lower Extremity   Gait Functional Level of Assist    Gait Level Of Assist Contact Guard Assist   Assistive Device Front Wheel Walker   Distance (Feet) 40   # of Times Distance was Traveled 2   Deviation Antalgic;Bradykinetic   Transfer Functional Level of Assist   Toilet Transfers Contact Guard Assist   Toilet Transfer Description Verbal cueing;Set-up of equipment;Increased time;Grab bar   Supine Lower Body Exercise   Supine Lower Body Exercises Yes  (hand-out given)   Straight Leg Raises 1 set of 10;Front   Short Arc Quad 1 set of 10;Bilateral   Heel Slide 1 set of 10;Bilateral   Ankle Pumps 1 set of 10;Reciprocal   Gluteal Isometrics 1 set of 10;Bilateral   Interdisciplinary Plan of Care Collaboration   IDT Collaboration with  Physical Therapist;Certified Nursing Assistant   Collaboration Comments treatment planning; bathroom assist at end of session         Assessment    Able to move perform bed mobility without leg  after trial with and without AE    Strengths: Able to follow instructions, Effective communication skills, Independent prior level of function, Motivated for self care and independence, Pleasant and cooperative, Supportive family, Willingly participates in therapeutic activities  Barriers: Generalized weakness, Home accessibility, Impaired activity tolerance, Impaired  balance, Pain poorly managed, Pain    Plan    Ambulation with FWW  Reinforce LE strengthening  Transfers for consistent sequencing  Begin stair training    DME  PT DME Recommendations  Assistive Device: Front Wheeled Walker    Passport items to be completed:  Get in/out of bed safely, in/out of a vehicle, safely use mobility device, walk or wheel around home/community, navigate up and down stairs, show how to get up/down from the ground, ensure home is accessible, demonstrate HEP, complete caregiver training    Physical Therapy Problems (Active)       Problem: Mobility       Dates: Start:  10/26/23         Goal: STG-Within one week, patient will ambulate community distances x 150 feet with FWW and SBA       Dates: Start:  10/26/23            Goal: STG-Within one week, patient will ambulate up/down a curb with FWW and SBA       Dates: Start:  10/26/23               Problem: Mobility Transfers       Dates: Start:  10/26/23         Goal: STG-Within one week, patient will transfer bed to chair with FWW and SBA       Dates: Start:  10/26/23               Problem: PT-Long Term Goals       Dates: Start:  10/26/23         Goal: LTG-By discharge, patient will ambulate x 150 feet with FWW, mod I        Dates: Start:  10/26/23            Goal: LTG-By discharge, patient will transfer one surface to another with FWW, mod I        Dates: Start:  10/26/23            Goal: LTG-By discharge, patient will ambulate up/down 4-6 stairs with BHR and SPV       Dates: Start:  10/26/23            Goal: LTG-By discharge, patient will transfer in/out of a car with FWW, mod I        Dates: Start:  10/26/23

## 2023-10-26 NOTE — PROGRESS NOTES
Hospital Medicine Daily Progress Note        Chief Complaint  Diabetes Mellitus    Interval Problem Update  Pt requesting to have his FSBS checked 10 times per day.  Due to its labor intensiveness for nursing staff, will allow pt to use his own CGM for informational purposes only, but not for clinical decision making.    Review of Systems  Review of Systems   Constitutional:  Negative for chills and fever.   HENT: Negative.     Eyes: Negative.    Respiratory:  Negative for cough and shortness of breath.    Cardiovascular:  Negative for chest pain and palpitations.   Gastrointestinal:  Negative for abdominal pain, nausea and vomiting.   Musculoskeletal:  Positive for joint pain.   Skin:  Negative for itching and rash.   Endo/Heme/Allergies:  Negative for polydipsia. Does not bruise/bleed easily.        Physical Exam  Temp:  [36.6 °C (97.8 °F)-37.2 °C (99 °F)] 36.8 °C (98.3 °F)  Pulse:  [77-85] 83  Resp:  [15-18] 16  BP: (116-127)/(65-68) 116/66  SpO2:  [95 %-97 %] 97 %    Physical Exam  Vitals reviewed.   Constitutional:       General: He is not in acute distress.     Appearance: Normal appearance. He is not ill-appearing.   HENT:      Head: Normocephalic and atraumatic.      Right Ear: External ear normal.      Left Ear: External ear normal.      Nose: Nose normal.      Mouth/Throat:      Pharynx: Oropharynx is clear.   Eyes:      General:         Right eye: No discharge.         Left eye: No discharge.      Extraocular Movements: Extraocular movements intact.      Conjunctiva/sclera: Conjunctivae normal.   Cardiovascular:      Rate and Rhythm: Normal rate and regular rhythm.   Pulmonary:      Effort: Pulmonary effort is normal. No respiratory distress.      Breath sounds: Normal breath sounds. No wheezing.   Abdominal:      General: Bowel sounds are normal. There is no distension.      Palpations: Abdomen is soft.      Tenderness: There is no abdominal tenderness.   Musculoskeletal:      Cervical back: Normal range  of motion and neck supple.      Right lower leg: Edema present.      Left lower leg: No edema.   Skin:     General: Skin is warm and dry.   Neurological:      Mental Status: He is alert and oriented to person, place, and time.         Fluids    Intake/Output Summary (Last 24 hours) at 10/26/2023 0912  Last data filed at 10/26/2023 0530  Gross per 24 hour   Intake 880 ml   Output 1500 ml   Net -620 ml       Laboratory  Recent Labs     10/24/23  0805 10/25/23  0729 10/26/23  0559   WBC  --   --  6.4   RBC 2.21* 3.25* 3.55*   HEMOGLOBIN 7.1* 10.4* 11.2*   HEMATOCRIT 20.3* 29.6* 33.2*   MCV 91.7 91.0 93.5   MCH 31.9 32.0 31.5   MCHC 34.8 35.2 33.7   RDW 12.8 13.6 45.6   PLATELETCT 191 208 278   MPV 8.2 8.1 9.8     Recent Labs     10/24/23  0805 10/26/23  0559   SODIUM 136 134*   POTASSIUM 4.0 4.5   CHLORIDE 102 97   CO2 30 29   GLUCOSE 172* 222*   BUN 24* 18   CREATININE 0.62* 0.54   CALCIUM 7.7* 8.2*                   Assessment/Plan  * Closed right hip fracture, initial encounter (HCC)- (present on admission)  Assessment & Plan  2/2 fall  S/P R hip hemiarthroplasty on 10/21/23 at [facility]  Wound care and pain control per Physiatry    Hypophosphatemia  Assessment & Plan  Levels correcting  Complete short course supplement    Depression  Assessment & Plan  On Lexapro    Anemia  Assessment & Plan  Had post-op anemia  S/P PRBC at [facility]  Fe 23, start supplement  Follow H/H    Azotemia  Assessment & Plan  Renal function resolved w/ PO fluids    Type 1 diabetes mellitus without complication (HCC)- (present on admission)  Assessment & Plan  HbA1c 6.3  Increase Lantus  Discontinued scheduled qac insulin  Continue SSI  Outpt meds include Tresiba 14 u qam and SSI    History of CVA (cerebrovascular accident)- (present on admission)  Assessment & Plan  On Coumadin and Lipitor       Full Code    Discussed w/ pt and RN Zoe)

## 2023-10-26 NOTE — DISCHARGE PLANNING
Case Management meet with patient at bedside. Patient goal is to get home as soon as possible and get to a point where he can walk confidently with assistive device. Patient would like to work with [facility] if possible to steamline care. Family already has [facility] in place for spouse. Patient has also used [facility].  Left contact information of primary .

## 2023-10-26 NOTE — PROGRESS NOTES
Pharmacy Warfarin Consult   10/26/2023     68 y.o.   male     Indication for anticoagulation: Stroke, Chronic anticoagulation    Goal INR = 2 - 3    Recent Labs     10/24/23  0805 10/25/23  0729 10/26/23  0559   HEMOGLOBIN 7.1* 10.4* 11.2*   HEMATOCRIT 20.3* 29.6* 33.2*       Pertinent Drug/Drug Interactions:  PPI  Outpatient Warfarin Regimen:  2.5 mg (5 mg x 0.5) every Tue, Thu, Sat; 5 mg (5 mg x 1) all other days  Recent Warfarin Dosing:    Dose from last 7 days       Date/Time Dose (mg)    10/26/23 1138 2.5    10/25/23 1338 5            Bridge Therapy: no        1.  Coumadin 2.5 mg tonight per home regimen   2.  INR draws MWF.      Reg Barron [facility]

## 2023-10-26 NOTE — THERAPY
Physical Therapy   Initial Evaluation     Patient Name: Hieu Duong  Age:  68 y.o., Sex:  male  Medical Record #: [id number]  Today's Date: 10/26/2023     Subjective    Pt was supine in bed upon arrival and agreeable to treatment.        Objective       10/26/23 1001   PT Charge Group   PT Gait Training (Units) 1   PT Evaluation PT Evaluation Mod   PT Total Time Spent   PT Individual Total Time Spent (Mins) 60   Prior Living Situation   Prior Services Home-Independent   Housing / Facility 3 Story House   Steps Into Home 3  (back door with BHR, 3 BETH with front door, then one additional step from deck (no HR))   Steps In Home 12  (Pt can stay on first floor)   Rail Both Rail (Steps into Home)  (Back door, no HR front door)   Elevator No   Equipment Owned None   Lives with - Patient's Self Care Capacity Spouse   Comments 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  Pt has adult children locally.   Prior Level of Functional Mobility   Bed Mobility Independent   Transfer Status Independent   Ambulation Independent   Distance Ambulation (Feet)   (Community)   Assistive Devices Used None   Stairs Independent   Prior Functioning: Everyday Activities   Self Care Independent   Indoor Mobility (Ambulation) Independent   Stairs Independent   Functional Cognition Independent   Prior Device Use None of the given options   Pain 0 - 10 Group   Location Hip   Location Orientation Right   Therapist Pain Assessment Prior to Activity;2;During Activity;7   Cognition    Level of Consciousness Alert   ABS (Agitated Behavior Scale)   Agitated Behavior Scale Performed No   Passive ROM Lower Body   Passive ROM Lower Body WDL   Active ROM Lower Body    Active ROM Lower Body  X   Comments RLE limited d/t pain   Strength Lower Body   Rt Hip Flexion Strength 3+ (F+)  (pain limited)   Rt Knee Flexion Strength 4- (G-)   Rt Knee Extension Strength 4 (G)   Rt Ankle Dorsiflexion Strength 5 (N)   Lt Hip Flexion Strength 5 (N)   Lt  Knee Flexion Strength 5 (N)   Lt Knee Extension Strength 5 (N)   Lt Ankle Dorsiflexion Strength 5 (N)   Sensation Lower Body   Lower Extremity Sensation   WDL   Comments Intact light touch BLE   Lower Body Muscle Tone   Lower Body Muscle Tone  WDL   Bed Mobility    Supine to Sit Standby Assist   Sit to Supine Standby Assist   Sit to Stand Contact Guard Assist   Scooting Standby Assist   Rolling Standby Assist   Roll Left and Right   Assistance Needed Supervision   CARE Score - Roll Left and Right 4   Roll Left and Right Discharge Goal   Discharge Goal 6   Sit to Lying   Assistance Needed Supervision;Adaptive equipment   CARE Score - Sit to Lying 4   Sit to Lying Discharge Goal   Discharge Goal 6   Lying to Sitting on Side of Bed   Assistance Needed Supervision;Adaptive equipment   CARE Score - Lying to Sitting on Side of Bed 4   Lying to Sitting on Side of Bed Discharge Goal   Discharge Goal 6   Sit to Stand   Assistance Needed Incidental touching;Supervision;Adaptive equipment   CARE Score - Sit to Stand 4   Sit to Stand Discharge Goal   Discharge Goal 6   Chair/Bed-to-Chair Transfer   Assistance Needed Incidental touching;Supervision;Adaptive equipment   CARE Score - Chair/Bed-to-Chair Transfer 4   Chair/Bed-to-Chair Transfer Discharge Goal   Discharge Goal 6   Car Transfer   Reason if not Attempted Safety concerns   CARE Score - Car Transfer 88   Car Transfer Discharge Goal   Discharge Goal 6   Walk 10 Feet   Assistance Needed Incidental touching;Supervision;Adaptive equipment   CARE Score - Walk 10 Feet 4   Walk 10 Feet Discharge Goal   Discharge Goal 6   Walk 50 Feet with Two Turns   Assistance Needed Incidental touching;Supervision;Adaptive equipment   CARE Score - Walk 50 Feet with Two Turns 4   Walk 50 Feet with Two Turns Discharge Goal   Discharge Goal 6   Walk 150 Feet   Assistance Needed Incidental touching;Supervision;Adaptive equipment   CARE Score - Walk 150 Feet 4   Walk 150 Feet Discharge Goal    Discharge Goal 6   Walking 10 Feet on Uneven Surfaces   Reason if not Attempted Safety concerns   CARE Score - Walking 10 Feet on Uneven Surfaces 88   Walking 10 Feet on Uneven Surfaces Discharge Goal   Discharge Goal 6   1 Step (Curb)   Reason if not Attempted Safety concerns   CARE Score - 1 Step (Curb) 88   1 Step (Curb) Discharge Goal   Discharge Goal 6   4 Steps   Reason if not Attempted Safety concerns   CARE Score - 4 Steps 88   4 Steps Discharge Goal   Discharge Goal 6   12 Steps   Reason if not Attempted Safety concerns   CARE Score - 12 Steps 88   12 Steps Discharge Goal   Discharge Goal 6   Picking Up Object   Reason if not Attempted Safety concerns   CARE Score - Picking Up Object 88   Picking Up Object Discharge Goal   Discharge Goal 6   Wheel 50 Feet with Two Turns   Reason if not Attempted Activity not applicable   CARE Score - Wheel 50 Feet with Two Turns 9   Wheel 50 Feet with Two Turns Discharge Goal   Discharge Goal 9   Wheel 150 Feet   Reason if not Attempted Activity not applicable   CARE Score - Wheel 150 Feet 9   Wheel 150 Feet Discharge Goal   Discharge Goal 9   Gait Functional Level of Assist    Gait Level Of Assist Contact Guard Assist   Assistive Device Front Wheel Walker   Distance (Feet) 150   # of Times Distance was Traveled 1   Deviation Antalgic;Bradykinetic;Decreased Heel Strike;Decreased Toe Off   Wheelchair Functional Level of Assist   Wheelchair Assist   (N/A as pt is ambulatory)   Stairs Functional Level of Assist   Level of Assist with Stairs Unable to Participate   Transfer Functional Level of Assist   Bed, Chair, Wheelchair Transfer Contact Guard Assist   Bed Chair Wheelchair Transfer Description Adaptive equipment;Increased time;Supervision for safety;Verbal cueing   Toilet Transfers Contact Guard Assist   Toilet Transfer Description Adaptive equipment;Supervision for safety;Verbal cueing   Problem List    Problems Pain;Impaired Bed Mobility;Impaired Transfers;Impaired  Ambulation;Functional Strength Deficit;Impaired Balance;Decreased Activity Tolerance   Precautions   Precautions Fall Risk;Weight Bearing As Tolerated Right Lower Extremity   Current Discharge Plan   Current Discharge Plan Return to Prior Living Situation   Interdisciplinary Plan of Care Collaboration   IDT Collaboration with  Physical Therapist;Occupational Therapist   Patient Position at End of Therapy In Bed;Call Light within Reach;Tray Table within Reach;Phone within Reach   Collaboration Comments CLOF   PT DME Recommendations   Assistive Device Front Wheeled Walker   Physical Therapist Assigned   Assigned PT / Treatment Time / Comments 60/30   Benefit   Therapy Benefit Patient Would Benefit from Inpatient Rehabilitation Physical Therapy to Maximize Functional [place] with ADLs, IADLs and Mobility.   Strengths & Barriers   Strengths Able to follow instructions;Effective communication skills;Independent prior level of function;Motivated for self care and independence;Pleasant and cooperative;Supportive family;Willingly participates in therapeutic activities   Barriers Generalized weakness;Home accessibility;Impaired activity tolerance;Impaired balance;Pain poorly managed;Pain     Pt educated on PT role, PT POC, rehab orientation.  Pt able to state 2/3 posterior hip precautions.     Assessment  Patient is 68 y.o. male with a diagnosis of R hip fx s/p hemiarthroplasty d/t GLF.  Additional factors influencing patient status / progress (ie: cognitive factors, co-morbidities, social support, etc): independent PLOF, good social support, PMH of stroke on anticoagulation, GERD, hyperlipidemia, type 1 diabetes mellitus, insomnia, BPH, hypertension.      Plan  Recommend Physical Therapy  minutes per day 5-7 days per week for 7-10 days for the following treatments:  PT Group Therapy, PT E Stim Attended, PT Gait Training, PT Therapeutic Exercises, PT Neuro Re-Ed/Balance, PT Aquatic Therapy, PT Therapeutic  Activity, PT Manual Therapy, and PT Evaluation.    Passport items to be completed:  Get in/out of bed safely, in/out of a vehicle, safely use mobility device, walk or wheel around home/community, navigate up and down stairs, show how to get up/down from the ground, ensure home is accessible, demonstrate HEP, complete caregiver training    Goals:  Long term and short term goals have been discussed with patient and they are in agreement.    Physical Therapy Problems (Active)       Problem: Mobility       Dates: Start:  10/26/23         Goal: STG-Within one week, patient will ambulate community distances x 150 feet with FWW and SBA       Dates: Start:  10/26/23            Goal: STG-Within one week, patient will ambulate up/down a curb with FWW and SBA       Dates: Start:  10/26/23               Problem: Mobility Transfers       Dates: Start:  10/26/23         Goal: STG-Within one week, patient will transfer bed to chair with FWW and SBA       Dates: Start:  10/26/23               Problem: PT-Long Term Goals       Dates: Start:  10/26/23         Goal: LTG-By discharge, patient will ambulate x 150 feet with FWW, mod I        Dates: Start:  10/26/23            Goal: LTG-By discharge, patient will transfer one surface to another with FWW, mod I        Dates: Start:  10/26/23            Goal: LTG-By discharge, patient will ambulate up/down 4-6 stairs with BHR and SPV       Dates: Start:  10/26/23            Goal: LTG-By discharge, patient will transfer in/out of a car with FWW, mod I        Dates: Start:  10/26/23

## 2023-10-26 NOTE — FLOWSHEET NOTE
10/25/23 1842   Protocol Assessment   Initial Assessment Yes   Patient History   Pulmonary Diagnosis None   Procedures Relevant to Respiratory Status None   Home O2 No   Nocturnal CPAP No   Home Treatments/Frequency No   Protocol Pathways   Protocol Pathways None

## 2023-10-26 NOTE — THERAPY
Occupational Therapy   Initial Evaluation     Patient Name: Hieu Duong  Age:  68 y.o., Sex:  male  Medical Record #: [id number]  Today's Date: 10/26/2023     Subjective    Patient in bed upon arrival, agreeable to participate in OT.      Objective     10/26/23 0701 10/26/23 1301   OT Charge Group   OT Self Care / ADL (Units) 1 2   OT Evaluation OT Evaluation Mod  --    OT Total Time Spent   OT Individual Total Time Spent (Mins) 60 30   Prior Living Situation   Prior Services Home-Independent  --    Housing / Facility 3 Story House  --    Steps Into Home 3  (back door with BHR, 3 BETH with front door, then one additional step from deck (no HR))  --    Steps In Home 12  (Pt can stay on first floor)  --    Rail Both Rail (Steps into Home)  (Back door, no HR front door)  --    Elevator No  --    Bathroom Set up Walk In Shower;Shower Chair  --    Equipment Owned None  --    Lives with - Patient's Self Care Capacity Spouse  --    Comments 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 2 daughters live locally  --    Prior Level of ADL Function   Self Feeding Independent  --    Grooming / Hygiene Independent  --    Bathing Independent  --    Dressing Independent  --    Toileting Independent  --    Comments no AD/AE  --    Prior Level of IADL Function   Medication Management Independent  --    Laundry Independent  --    Kitchen Mobility Independent  --    Finances   (Shares task w/ spouse)  --    Home Management Independent  --    Shopping Independent  --    Prior Level Of Mobility Independent Without Device in Community;Independent Without Device in Home  --    Driving / Transportation Driving Independent  --    Occupation (Pre-Hospital Vocational)   (Semi retired; mentor role in mining industry)  --    Prior Functioning: Everyday Activities   Self Care Independent  --    Indoor Mobility (Ambulation) Independent  --    Stairs Independent  --    Functional Cognition Independent  --    Prior  "Device Use None of the given options  --    Vitals   O2 Delivery Device None - Room Air None - Room Air   Cognition    Level of Consciousness Alert Alert   Cognitive Pattern Assessment   Cognitive Pattern Assessment Used BIMS  --    Brief Interview for Mental Status (BIMS)   Repetition of Three Words (First Attempt) 3  --    Temporal Orientation: Year Correct  --    Temporal Orientation: Month Accurate within 5 days  --    Temporal Orientation: Day Correct  --    Recall: \"Sock\" Yes, no cue required  --    Recall: \"Blue\" Yes, no cue required  --    Recall: \"Bed\" No, could not recall  --    BIMS Summary Score 13  --    Confusion Assessment Method (CAM)   Is there evidence of an acute change in mental status from the patient's baseline? No  --    Inattention Behavior not present  --    Disorganized thinking Behavior not present  --    Altered level of consciousness Behavior not present  --    Vision Screen   Vision Not tested  --    Passive ROM Upper Body   Passive ROM Upper Body WDL  --    Active ROM Upper Body   Active ROM Upper Body  WDL  --    Dominant Hand Right  --    Strength Upper Body   Upper Body Strength  WDL  --    Comments BUEs not formally assessed however strength appears grossly WFL based on functional assessment  --    Sensation Upper Body   Comments No UE sensory deficits  --    Upper Body Muscle Tone   Upper Body Muscle Tone  WDL  --    Balance Assessment   Sitting Balance (Static) Normal  --    Sitting Balance (Dynamic) Good  --    Standing Balance (Static) Fair -  --    Standing Balance (Dynamic) Fair -  --    Bed Mobility    Supine to Sit Standby Assist  --    Sit to Supine Standby Assist  --    Sit to Stand Contact Guard Assist  --    Scooting Standby Assist  --    Rolling Standby Assist  --    Coordination Upper Body   Coordination WDL  --    Eating   Assistance Needed Set-up / clean-up  --    CARE Score - Eating 5  --    Eating Discharge Goal   Discharge Goal 6  --    Oral Hygiene   Assistance " Needed Set-up / clean-up  --    CARE Score - Oral Hygiene 5  --    Oral Hygiene Discharge Goal   Discharge Goal 6  --    Shower/Bathe Self   Assistance Needed Physical assistance  --    Physical Assistance Level 25% or less  --    CARE Score - Shower/Bathe Self 3  --    Shower/Bathe Self Discharge Goal   Discharge Goal 6  --    Upper Body Dressing   Assistance Needed Set-up / clean-up  --    CARE Score - Upper Body Dressing 5  --    Upper Body Dressing Discharge Goal   Discharge Goal 6  --    Lower Body Dressing   Assistance Needed Physical assistance  --    Physical Assistance Level 51%-75%  --    CARE Score - Lower Body Dressing 2  --    Lower Body Dressing Discharge Goal   Discharge Goal 6  --    Putting On/Taking Off Footwear   Assistance Needed Physical assistance  --    Physical Assistance Level 51%-75%  --    CARE Score - Putting On/Taking Off Footwear 2  --    Putting On/Taking Off Footwear Discharge Goal   Discharge Goal 6  --    Toileting Hygiene   Assistance Needed Physical assistance  --    Physical Assistance Level 25% or less  --    CARE Score - Toileting Hygiene 3  --    Toileting Hygiene Discharge Goal   Discharge Goal 6  --    Toilet Transfer   Assistance Needed Physical assistance  --    Physical Assistance Level 25% or less  --    CARE Score - Toilet Transfer 3  --    Toilet Transfer Discharge Goal   Discharge Goal 6  --    Hearing, Speech, and Vision   Ability to Hear Adequate  --    Ability to See in Adequate Light Adequate  --    Expression of Ideas and Wants Without difficulty  --    Understanding Verbal and Non-Verbal Content Understands  --    Functional Level of Assist   Eating Stand by Assist  --    Grooming Standby Assist Standby Assist;Standing   Bathing Minimal Assist  (while incorporating sitting and standing w/ GB)  --    Upper Body Dressing Stand by Assist  (set-up)  --    Lower Body Dressing Maximal Assist Contact Guard Assist   Toileting Minimal Assist Minimal Assist   Bed, Chair,  Wheelchair Transfer Minimal Assist  (min-CGA) Contact Guard Assist  (FWW level)   Toilet Transfers Minimal Assist  (Min-CGA) Contact Guard Assist  (CGA-SBA FWW level)   Tub / Shower Transfers Minimal Assist  (Min-CGA)  --    Problem List   Problem List Decreased Active Daily Living Skills;Decreased Homemaking Skills;Decreased Functional Mobility;Decreased Activity Tolerance;Impaired Postural Control / Balance  --    Precautions   Precautions Fall Risk;Weight Bearing As Tolerated Right Lower Extremity Fall Risk;Weight Bearing As Tolerated Right Lower Extremity   Current Discharge Plan   Current Discharge Plan Return to Prior Living Situation  (home in [place] w/ spouse and daughters' support)  --    Benefit    Therapy Benefit Patient Would Benefit from Inpatient Rehab Occupational Therapy to Maximize [place] with ADLs, IADLs and Functional Mobility.  --    Interdisciplinary Plan of Care Collaboration   IDT Collaboration with  Physical Therapist  --    Patient Position at End of Therapy Seated;Self Releasing Lap Belt Applied;Call Light within Reach In Bed;Call Light within Reach   Collaboration Comments CLOF  --    OT DME Recommendations   Bathroom Equipment   (shower chair and grab bars)  --    Strengths & Barriers   Strengths Willingly participates in therapeutic activities;Supportive family;Pleasant and cooperative;Motivated for self care and independence;Independent prior level of function;Good insight into deficits/needs;Alert and oriented;Adequate strength;Effective communication skills  --    Occupational Therapist Assigned   Assigned OT / Treatment Time / Comments TJ 60-90 mins  --      AE education to facilitate LB dressing independence w/ successful return demo     Assessment  Patient is 68 y.o. male with a diagnosis of s/p R hip fracture/ hemiarthroplasty . Patient w/ a past medical history of stroke on anticoagulation, GERD, hyperlipidemia, type 1 diabetes mellitus, insomnia, BPH, hypertension.  Patient found to have right femoral neck fracture s/p fall.     At baseline patient is independent with all ADLs, IADLs and functional mobility without AD. 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 Family local, can assist as needed. Patient resides in 3 story home (1st floor setup) in [place] w/ 3STE. At time of OT eval, patient presents below functional baseline w/ primary barriers being decreased balance, decreased mobility, and pain. He will benefit from daily skilled OT to address above deficits to maximize functional safety/independence prior to DC home.     Plan  Recommend Occupational Therapy  minutes per day 5-7 days per week for 5-7 days for the following treatments:  OT Self Care/ADL, OT Community Reintegration, OT Manual Ther Technique, OT Neuro Re-Ed/Balance, OT Therapeutic Activity, OT Evaluation, and OT Therapeutic Exercise.    Passport items to be completed:  Perform bathroom transfers, complete dressing, complete feeding, get ready for the day, prepare a simple meal, participate in household tasks, adapt home for safety needs, demonstrate home exercise program, complete caregiver training     Goals:  Long term and short term goals have been discussed with patient and they are in agreement.    Occupational Therapy Goals (Active)       There are no active problems.

## 2023-10-27 ENCOUNTER — APPOINTMENT (OUTPATIENT)
Dept: OCCUPATIONAL THERAPY | Facility: REHABILITATION | Age: 68
DRG: 560 | End: 2023-10-27
Attending: PHYSICAL MEDICINE & REHABILITATION
Payer: MEDICARE

## 2023-10-27 ENCOUNTER — APPOINTMENT (OUTPATIENT)
Dept: PHYSICAL THERAPY | Facility: REHABILITATION | Age: 68
DRG: 560 | End: 2023-10-27
Attending: PHYSICAL MEDICINE & REHABILITATION
Payer: MEDICARE

## 2023-10-27 LAB
GLUCOSE BLD STRIP.AUTO-MCNC: 149 MG/DL (ref 65–99)
GLUCOSE BLD STRIP.AUTO-MCNC: 238 MG/DL (ref 65–99)
GLUCOSE BLD STRIP.AUTO-MCNC: 246 MG/DL (ref 65–99)
GLUCOSE BLD STRIP.AUTO-MCNC: 257 MG/DL (ref 65–99)
INR PPP: 2.84 (ref 0.87–1.13)
PROTHROMBIN TIME: 30.2 SEC (ref 12–14.6)

## 2023-10-27 PROCEDURE — 97535 SELF CARE MNGMENT TRAINING: CPT

## 2023-10-27 PROCEDURE — 97112 NEUROMUSCULAR REEDUCATION: CPT

## 2023-10-27 PROCEDURE — 82962 GLUCOSE BLOOD TEST: CPT

## 2023-10-27 PROCEDURE — 700102 HCHG RX REV CODE 250 W/ 637 OVERRIDE(OP): Performed by: PHYSICAL MEDICINE & REHABILITATION

## 2023-10-27 PROCEDURE — 99232 SBSQ HOSP IP/OBS MODERATE 35: CPT | Performed by: PHYSICAL MEDICINE & REHABILITATION

## 2023-10-27 PROCEDURE — 85610 PROTHROMBIN TIME: CPT

## 2023-10-27 PROCEDURE — 36415 COLL VENOUS BLD VENIPUNCTURE: CPT

## 2023-10-27 PROCEDURE — 700102 HCHG RX REV CODE 250 W/ 637 OVERRIDE(OP): Performed by: HOSPITALIST

## 2023-10-27 PROCEDURE — A9270 NON-COVERED ITEM OR SERVICE: HCPCS | Performed by: PHYSICAL MEDICINE & REHABILITATION

## 2023-10-27 PROCEDURE — A9270 NON-COVERED ITEM OR SERVICE: HCPCS | Performed by: HOSPITALIST

## 2023-10-27 PROCEDURE — 99232 SBSQ HOSP IP/OBS MODERATE 35: CPT | Performed by: HOSPITALIST

## 2023-10-27 PROCEDURE — 770010 HCHG ROOM/CARE - REHAB SEMI PRIVAT*

## 2023-10-27 PROCEDURE — 97530 THERAPEUTIC ACTIVITIES: CPT

## 2023-10-27 PROCEDURE — 97116 GAIT TRAINING THERAPY: CPT

## 2023-10-27 PROCEDURE — 97110 THERAPEUTIC EXERCISES: CPT

## 2023-10-27 RX ORDER — WARFARIN SODIUM 2.5 MG/1
2.5 TABLET ORAL
Status: COMPLETED | OUTPATIENT
Start: 2023-10-28 | End: 2023-10-28

## 2023-10-27 RX ORDER — OXYCODONE HYDROCHLORIDE 5 MG/1
5 TABLET ORAL
Status: DISCONTINUED | OUTPATIENT
Start: 2023-10-27 | End: 2023-10-31 | Stop reason: HOSPADM

## 2023-10-27 RX ORDER — POLYETHYLENE GLYCOL 3350 17 G/17G
1 POWDER, FOR SOLUTION ORAL
Status: DISCONTINUED | OUTPATIENT
Start: 2023-10-27 | End: 2023-10-31 | Stop reason: HOSPADM

## 2023-10-27 RX ORDER — BISACODYL 10 MG
10 SUPPOSITORY, RECTAL RECTAL
Status: DISCONTINUED | OUTPATIENT
Start: 2023-10-27 | End: 2023-10-31 | Stop reason: HOSPADM

## 2023-10-27 RX ORDER — OXYCODONE HYDROCHLORIDE 5 MG/1
2.5-5 TABLET ORAL
Status: DISCONTINUED | OUTPATIENT
Start: 2023-10-27 | End: 2023-10-31 | Stop reason: HOSPADM

## 2023-10-27 RX ORDER — WARFARIN SODIUM 5 MG/1
5 TABLET ORAL
Status: COMPLETED | OUTPATIENT
Start: 2023-10-27 | End: 2023-10-27

## 2023-10-27 RX ORDER — LANOLIN ALCOHOL/MO/W.PET/CERES
6 CREAM (GRAM) TOPICAL
Status: DISCONTINUED | OUTPATIENT
Start: 2023-10-27 | End: 2023-10-31 | Stop reason: HOSPADM

## 2023-10-27 RX ORDER — AMOXICILLIN 250 MG
2 CAPSULE ORAL 2 TIMES DAILY PRN
Status: DISCONTINUED | OUTPATIENT
Start: 2023-10-27 | End: 2023-10-31 | Stop reason: HOSPADM

## 2023-10-27 RX ORDER — WARFARIN SODIUM 5 MG/1
5 TABLET ORAL
Status: COMPLETED | OUTPATIENT
Start: 2023-10-29 | End: 2023-10-29

## 2023-10-27 RX ADMIN — Medication 6 MG: at 21:39

## 2023-10-27 RX ADMIN — OXYCODONE HYDROCHLORIDE 10 MG: 5 TABLET ORAL at 05:31

## 2023-10-27 RX ADMIN — ACETAMINOPHEN 500 MG: 500 TABLET ORAL at 05:31

## 2023-10-27 RX ADMIN — ACETAMINOPHEN 500 MG: 500 TABLET ORAL at 17:20

## 2023-10-27 RX ADMIN — OXYCODONE HYDROCHLORIDE 5 MG: 5 TABLET ORAL at 14:42

## 2023-10-27 RX ADMIN — ESCITALOPRAM OXALATE 10 MG: 10 TABLET ORAL at 21:32

## 2023-10-27 RX ADMIN — ACETAMINOPHEN 500 MG: 500 TABLET ORAL at 11:08

## 2023-10-27 RX ADMIN — OXYCODONE HYDROCHLORIDE 5 MG: 5 TABLET ORAL at 21:33

## 2023-10-27 RX ADMIN — ATORVASTATIN CALCIUM 20 MG: 10 TABLET, FILM COATED ORAL at 21:33

## 2023-10-27 RX ADMIN — DIBASIC SODIUM PHOSPHATE, MONOBASIC POTASSIUM PHOSPHATE AND MONOBASIC SODIUM PHOSPHATE 250 MG: 852; 155; 130 TABLET ORAL at 07:51

## 2023-10-27 RX ADMIN — OXYCODONE HYDROCHLORIDE 5 MG: 5 TABLET ORAL at 17:19

## 2023-10-27 RX ADMIN — Medication 324 MG: at 07:51

## 2023-10-27 RX ADMIN — SENNOSIDES AND DOCUSATE SODIUM 2 TABLET: 8.6; 5 TABLET ORAL at 21:39

## 2023-10-27 RX ADMIN — OMEPRAZOLE 40 MG: 20 CAPSULE, DELAYED RELEASE ORAL at 07:51

## 2023-10-27 RX ADMIN — SENNOSIDES AND DOCUSATE SODIUM 2 TABLET: 50; 8.6 TABLET ORAL at 07:51

## 2023-10-27 RX ADMIN — WARFARIN SODIUM 5 MG: 5 TABLET ORAL at 17:20

## 2023-10-27 RX ADMIN — ACETAMINOPHEN 500 MG: 500 TABLET ORAL at 14:42

## 2023-10-27 RX ADMIN — OXYCODONE HYDROCHLORIDE 5 MG: 5 TABLET ORAL at 11:09

## 2023-10-27 RX ADMIN — NITROFURANTOIN MONOHYDRATE/MACROCRYSTALS 100 MG: 75; 25 CAPSULE ORAL at 07:51

## 2023-10-27 RX ADMIN — OXYCODONE HYDROCHLORIDE AND ACETAMINOPHEN 500 MG: 500 TABLET ORAL at 07:51

## 2023-10-27 ASSESSMENT — GAIT ASSESSMENTS
DEVIATION: ANTALGIC;BRADYKINETIC
DISTANCE (FEET): 100
GAIT LEVEL OF ASSIST: STANDBY ASSIST
ASSISTIVE DEVICE: FRONT WHEEL WALKER

## 2023-10-27 ASSESSMENT — PAIN DESCRIPTION - PAIN TYPE
TYPE: ACUTE PAIN
TYPE: ACUTE PAIN

## 2023-10-27 ASSESSMENT — ENCOUNTER SYMPTOMS
FEVER: 0
NAUSEA: 0
VOMITING: 0
COUGH: 0
ABDOMINAL PAIN: 0
POLYDIPSIA: 0
PALPITATIONS: 0
SHORTNESS OF BREATH: 0
CHILLS: 0
BRUISES/BLEEDS EASILY: 0
EYES NEGATIVE: 1

## 2023-10-27 ASSESSMENT — ACTIVITIES OF DAILY LIVING (ADL)
BED_CHAIR_WHEELCHAIR_TRANSFER_DESCRIPTION: INCREASED TIME;SUPERVISION FOR SAFETY
TOILETING_LEVEL_OF_ASSIST_DESCRIPTION: GRAB BAR;INCREASED TIME;INITIAL PREPARATION FOR TASK;SET-UP OF EQUIPMENT;SUPERVISION FOR SAFETY

## 2023-10-27 NOTE — PROGRESS NOTES
Pharmacy Warfarin Consult   10/27/2023     68 y.o.   male     Indication for anticoagulation: Stroke, Chronic anticoagulation     Goal INR = 2 - 3    Recent Labs     10/25/23  0729 10/26/23  0559 10/27/23  0558   INR  --   --  2.84*   HEMOGLOBIN 10.4* 11.2*  --    HEMATOCRIT 29.6* 33.2*  --        Pertinent Drug/Drug Interactions:  PPI  Outpatient Warfarin Regimen:  2.5 mg (5 mg x 0.5) every Tue, Thu, Sat; 5 mg (5 mg x 1) all other days  Recent Warfarin Dosing:    Dose from last 7 days       Date/Time Dose (mg)    10/27/23 0844 5    10/26/23 1138 2.5    10/25/23 1338 5              Bridge Therapy: no           1.  Coumadin 5 mg tonight, 2.5 mg on 10/28/23, and 5 mg  on 10/29/2023 per INR = 2.84  2.  INR draws [facility].        Reg Barron [facility]

## 2023-10-27 NOTE — PROGRESS NOTES
"  Physical Medicine & Rehabilitation Progress Note    Encounter Date: 10/27/2023    Chief Complaint: Feeling better    Interval Events (Subjective):  VSS  10/26/23 x2  Voiding volitionally    Seen and examined in his room. States that he is much better than he had been.  He had a bowel movement and feels completely relieved.  He would like his bowel medications as needed he is not having any more urinary discomfort.  Discussed stopping the antibiotic due to the fact that his urinalysis was negative.  He is behind the pain and is open to scheduling some pain medication.  He also does not want to have the Ambien but would like to have melatonin scheduled.      ROS: 14 point ROS negative unless otherwise specified in the HPI    Objective:  VITAL SIGNS: /73   Pulse 84   Temp 36.8 °C (98.2 °F) (Temporal)   Resp 18   Ht 1.702 m (5' 7\")   Wt 59.6 kg (131 lb 8 oz)   SpO2 96%   BMI 20.60 kg/m²     GEN: No apparent distress  HEENT: Head normocephalic, atraumatic.  Sclera nonicteric bilaterally, no ocular discharge appreciated bilaterally.  CV: Extremities warm and well-perfused, no peripheral edema appreciated bilaterally.  PULMONARY: Breathing nonlabored on room air, no respiratory accessory muscle use.  Not requiring supplemental oxygen.  SKIN: No appreciable skin breakdown on exposed areas of skin.  PSYCH: Mood and affect within normal limits.  NEURO: Awake alert.  Conversational.  Logical thought content.      Laboratory Values:  Recent Results (from the past 72 hour(s))   COMPLETE CBC & AUTO DIFF WBC    Collection Time: 10/25/23  7:29 AM   Result Value Ref Range    Leukocytes, Absolute 7.2 3.7 - 10.6 x10E3/uL    RBC 3.25 (L) 4.50 - 5.70 x10e6/uL    Hemoglobin 10.4 (L) 13.0 - 16.7 g/dL    Hematocrit 29.6 (L) 38.8 - 49.7 %    MCV 91.0 83.0 - 99.0 fL    MCH 32.0 28.0 - 33.8 pg    MCHC 35.2 33.1 - 36.5 g/dL    RDW 13.6 11.8 - 14.0 %    Platelet Count 208 146 - 390 x10E3/uL    MPV 8.1 6.4 - 10.2 fL    " Neutrophils-Polys 72.3 41.7 - 82.3 %    Lymphocytes 15.6 10.8 - 44.4 %    Monocytes 9.8 5.0 - 12.8 %    Eosinophils 1.9 0.0 - 6.6 %    Basophils 0.4 0.0 - 1.3 %    Neutrophils (Absolute) 5.20 1.40 - 8.00 x10E3/uL    Lymphs (Absolute) 1.10 1.00 - 5.20 x10E3/uL    Monos (Absolute) 0.70 0.16 - 1.00 x10E3/uL    Eos (Absolute) 0.10 0.00 - 0.80 x10E3/uL    Baso (Absolute) 0.00 0.00 - 0.30 x10E3/uL   POCT glucose device results    Collection Time: 10/25/23  2:53 PM   Result Value Ref Range    POC Glucose, Blood 153 (H) 65 - 99 mg/dL   POCT glucose device results    Collection Time: 10/25/23  5:00 PM   Result Value Ref Range    POC Glucose, Blood 125 (H) 65 - 99 mg/dL   POCT glucose device results    Collection Time: 10/25/23  8:47 PM   Result Value Ref Range    POC Glucose, Blood 191 (H) 65 - 99 mg/dL   Comp Metabolic Panel (CMP)    Collection Time: 10/26/23  5:59 AM   Result Value Ref Range    Sodium 134 (L) 135 - 145 mmol/L    Potassium 4.5 3.6 - 5.5 mmol/L    Chloride 97 96 - 112 mmol/L    Co2 29 20 - 33 mmol/L    Anion Gap 8.0 7.0 - 16.0    Glucose 222 (H) 65 - 99 mg/dL    Bun 18 8 - 22 mg/dL    Creatinine 0.54 0.50 - 1.40 mg/dL    Calcium 8.2 (L) 8.5 - 10.5 mg/dL    Correct Calcium 9.2 8.5 - 10.5 mg/dL    AST(SGOT) 31 12 - 45 U/L    ALT(SGPT) 27 2 - 50 U/L    Alkaline Phosphatase 74 30 - 99 U/L    Total Bilirubin 1.6 (H) 0.1 - 1.5 mg/dL    Albumin 2.8 (L) 3.2 - 4.9 g/dL    Total Protein 5.7 (L) 6.0 - 8.2 g/dL    Globulin 2.9 1.9 - 3.5 g/dL    A-G Ratio 1.0 g/dL   CBC with Differential    Collection Time: 10/26/23  5:59 AM   Result Value Ref Range    WBC 6.4 4.8 - 10.8 K/uL    RBC 3.55 (L) 4.70 - 6.10 M/uL    Hemoglobin 11.2 (L) 14.0 - 18.0 g/dL    Hematocrit 33.2 (L) 42.0 - 52.0 %    MCV 93.5 81.4 - 97.8 fL    MCH 31.5 27.0 - 33.0 pg    MCHC 33.7 32.3 - 36.5 g/dL    RDW 45.6 35.9 - 50.0 fL    Platelet Count 278 164 - 446 K/uL    MPV 9.8 9.0 - 12.9 fL    Neutrophils-Polys 68.60 44.00 - 72.00 %    Lymphocytes 16.80 (L)  22.00 - 41.00 %    Monocytes 9.80 0.00 - 13.40 %    Eosinophils 3.90 0.00 - 6.90 %    Basophils 0.60 0.00 - 1.80 %    Immature Granulocytes 0.30 0.00 - 0.90 %    Nucleated RBC 0.00 0.00 - 0.20 /100 WBC    Neutrophils (Absolute) 4.40 1.82 - 7.42 K/uL    Lymphs (Absolute) 1.08 1.00 - 4.80 K/uL    Monos (Absolute) 0.63 0.00 - 0.85 K/uL    Eos (Absolute) 0.25 0.00 - 0.51 K/uL    Baso (Absolute) 0.04 0.00 - 0.12 K/uL    Immature Granulocytes (abs) 0.02 0.00 - 0.11 K/uL    NRBC (Absolute) 0.00 K/uL   Magnesium    Collection Time: 10/26/23  5:59 AM   Result Value Ref Range    Magnesium 1.7 1.5 - 2.5 mg/dL   Phosphorus    Collection Time: 10/26/23  5:59 AM   Result Value Ref Range    Phosphorus 2.9 2.5 - 4.5 mg/dL   TSH with Reflex to FT4    Collection Time: 10/26/23  5:59 AM   Result Value Ref Range    TSH 1.860 0.380 - 5.330 uIU/mL   HEMOGLOBIN A1C    Collection Time: 10/26/23  5:59 AM   Result Value Ref Range    Glycohemoglobin 6.3 (H) 4.0 - 5.6 %    Est Avg Glucose 134 mg/dL   IRON/TOTAL IRON BIND    Collection Time: 10/26/23  5:59 AM   Result Value Ref Range    Iron 23 (L) 50 - 180 ug/dL    Total Iron Binding 146 (L) 250 - 450 ug/dL    Unsat Iron Binding 123 110 - 370 ug/dL    % Saturation 16 15 - 55 %   ESTIMATED GFR    Collection Time: 10/26/23  5:59 AM   Result Value Ref Range    GFR (CKD-EPI) 108 >60 mL/min/1.73 m 2   POCT glucose device results    Collection Time: 10/26/23  8:21 AM   Result Value Ref Range    POC Glucose, Blood 257 (H) 65 - 99 mg/dL   POCT glucose device results    Collection Time: 10/26/23 11:34 AM   Result Value Ref Range    POC Glucose, Blood 265 (H) 65 - 99 mg/dL   URINALYSIS    Collection Time: 10/26/23 11:52 AM    Specimen: Urine, Clean Catch   Result Value Ref Range    Color Yellow     Character Clear     Specific Gravity 1.036 <1.035    Ph 6.5 5.0 - 8.0    Glucose >=1000 (A) Negative mg/dL    Ketones 15 (A) Negative mg/dL    Protein Negative Negative mg/dL    Bilirubin Negative Negative     Urobilinogen, Urine 1.0 Negative    Nitrite Negative Negative    Leukocyte Esterase Negative Negative    Occult Blood Negative Negative    Micro Urine Req see below    POCT glucose device results    Collection Time: 10/26/23  5:27 PM   Result Value Ref Range    POC Glucose, Blood 117 (H) 65 - 99 mg/dL   POCT glucose device results    Collection Time: 10/26/23  7:44 PM   Result Value Ref Range    POC Glucose, Blood 202 (H) 65 - 99 mg/dL   Prothrombin Time    Collection Time: 10/27/23  5:58 AM   Result Value Ref Range    PT 30.2 (H) 12.0 - 14.6 sec    INR 2.84 (H) 0.87 - 1.13   POCT glucose device results    Collection Time: 10/27/23  7:44 AM   Result Value Ref Range    POC Glucose, Blood 149 (H) 65 - 99 mg/dL       Medications:  Scheduled Medications   Medication Dose Frequency    [START ON 10/28/2023] warfarin  2.5 mg ONCE AT 1800    warfarin  5 mg ONCE AT 1800    [START ON 10/29/2023] warfarin  5 mg ONCE AT 1800    insulin GLARGINE  16 Units DAILY    ferrous gluconate  324 mg QDAY with Breakfast    ascorbic acid  500 mg QDAY with Breakfast    nitrofurantoin  100 mg BID WITH MEALS    Pharmacy Consult Request  1 Each PHARMACY TO DOSE    senna-docusate  2 Tablet BID    escitalopram  10 mg QHS    atorvastatin  20 mg Q EVENING    omeprazole  40 mg DAILY    insulin regular  2-12 Units 4X/DAY ACHS    acetaminophen  500 mg 4X/DAY    MD Alert...Warfarin per Pharmacy   PHARMACY TO DOSE     PRN medications: Respiratory Therapy Consult, lidocaine, hydrALAZINE, senna-docusate **AND** polyethylene glycol/lytes **AND** magnesium hydroxide **AND** bisacodyl, acetaminophen, carboxymethylcellulose, benzocaine-menthol, mag hydrox-al hydrox-simeth, ondansetron **OR** ondansetron, sodium chloride, melatonin, oxyCODONE immediate-release, insulin regular **AND** POC blood glucose manual result **AND** NOTIFY MD and PharmD **AND** Administer 20 grams of glucose (approximately 8 ounces of fruit juice) every 15 minutes PRN FSBG less than 70  mg/dL **AND** dextrose bolus, zolpidem    Diet:  Current Diet Order   Procedures    Diet Order Diet: Consistent CHO (Diabetic)       Medical Decision Making and Plan:  Right femoral neck fracture s/p right hip hemiarthroplasty 10/21/2023 Dr. Hernandez  PT and OT for mobility and ADLs. Per guidelines, 15 hours per week between PT, OT and/or SLP.  Follow-up orthopedics  Stable/sutures out 14 days postoperatively  Weightbearing as tolerated right lower extremity     Pain  Scheduled Tylenol  As needed oxycodone and Tylenol  Lidocaine patches as needed  10/27 schedule oxycodone 5 mg 5 times daily, change as needed oxycodone to 2.5-5 mg every 2 hours.     Acute blood loss anemia  Required transfusion 10/24  Improved to 11.2 10/26    Hyponatremia  Mildly low 134  Monitor    Hypophosphatemia  Mildly low, on supplement     H/o GI bleed on ASA and Plavix ~2014  Monitor H/H     Hypertension  Orthostatic hypotension  Enalapril on hold since Clif Garcia  Enalapril still on hold, blood pressure currently controlled    Type 1 diabetes mellitus with hyperglycemia  Restart insulin -at home he had been on Lyumjev KwikPen 4 units 3 times daily with meals and sliding scale + Tresiba 14 units once daily  Consult hospitalist  Hgb A1c 6.3     History of GERD  Continue PPI     History of stroke  Right eye vision loss   mild aphasia  Being treated as if cardioembolic though loop recorder has never shown arrhythmia  Continue warfarin  INR goal 2-3     Skin  Patient at risk for skin breakdown due to debility in areas including sacrum, achilles, elbows and head in addition to other sites. Nursing to assess skin daily.      Bowel   Patient on Senna-docusate as needed for constipation prophylaxis.      Bladder  History of BPH  Dysuria  Monitor PVRs and bladder scans  Timed voids  Had been on sildenafil  UA 10/26 - (-)  Start Macrobid after UA until Cx returns  10/27 culture negative, stop Macrobid     Insomnia  Had been on Ambien 10  mg  Discontinue as needed Ambien, start scheduled melatonin 6 mg.    Ordered labs for 10/30: CBC, BMP, phosphorus     Avoid polypharmacy -record review and history indicate that patient had taken Ambien at night before his fall     DVT PROPHYLAXIS: Warfarin (history of stroke)     HOSPITALIST FOLLOWING: YES - d/w hospitalist 10/27     CODE STATUS: FULL CODE d/w patient 10/25/2023      DISPO: ANDREA TBD. Home with wife who is on hospice.      M2B ELIGIBLE: TBD     DISCHARGE FOLLOW UP: Orthopedics ([facility] [place])  ____________________________________    Dr. Montserrat Farnsworth DO, MS  [facility] - Physical Medicine & Rehabilitation   ____________________________________

## 2023-10-27 NOTE — PROGRESS NOTES
NURSING DAILY NOTE    Name: Hieu Duong   Date of Admission: 10/25/2023   Admitting Diagnosis: Closed right hip fracture, initial encounter ([facility])  Attending Physician: Montserrat Farnsworth D.o.  Allergies: Tdap [dipth, acell pertus, tetanus]    Safety  Patient Assist     Patient Precautions  Fall Risk, Weight Bearing As Tolerated Right Lower Extremity  Precaution Comments     Bed Transfer Status  Contact Guard Assist (FWW level)  Toilet Transfer Status   Contact Guard Assist  Assistive Devices  Rails, Wheelchair, Gait Belt  Oxygen  None - Room Air  Diet/Therapeutic Dining  Current Diet Order   Procedures    Diet Order Diet: Consistent CHO (Diabetic)     Pill Administration  whole  Agitated Behavioral Scale     ABS Level of Severity       Fall Risk  Has the patient had a fall this admission?   No  Nancy Tracey Fall Risk Scoring  16, HIGH RISK  Fall Risk Safety Measures  bed alarm    Vitals  Temperature: 36.8 °C (98.2 °F)  Temp src: Temporal  Pulse: 81  Respiration: 17  Blood Pressure : 133/65  Blood Pressure MAP (Calculated): 88 MM HG  BP Location: Left, Upper Arm  Patient BP Position: Supine     Oxygen  Pulse Oximetry: 96 %  O2 (LPM): 0  O2 Delivery Device: None - Room Air    Bowel and Bladder  Last Bowel Movement  10/26/23  Stool Type  Type 5: Soft blob with clear cut edges (passed easily)  Bowel Device     Continent  Bladder: Did not void   Bowel: No movement  Bladder Function  Urine Void (mL): 100 ml  Number of Times Voided: 1  Urine Color: Yellow  Number of Times Incontinent of Urine: 0  Genitourinary Assessment   Bladder Assessment (WDL):  WDL Except  Hagan Catheter: Not Applicable  Urine Color: Yellow  Number of Bladder Accidents: 0  Total Number of Bladder of Accidents in Last 7 Days: 0  Number of Times Incontinent of Urine: 0  Bladder Device: Urinal  Time Void: Yes  Bladder Scan: Post Void  $ Bladder Scan Results (mL): 285    Skin  West  Score   19  Sensory Interventions   Bed Types: Standard/Trauma Mattress  Skin Preventative Measures: Waffle Overlay, Pillows in Use for Support / Positioning  Moisture Interventions         Pain  Pain Rating Scale  3 - Sometimes distracts me  Pain Location  Hip  Pain Location Orientation  Right  Pain Interventions   Cold Pack    ADLs    Bathing      Linen Change      Personal Hygiene     Chlorhexidine Bath      Oral Care     Teeth/Dentures     Shave     Nutrition Percentage Eaten  *  * Meal *  *, Lunch, Between % Consumed  Environmental Precautions  Treaded Slipper Socks on Patient, Personal Belongings, Wastebasket, Call Bell etc. in Easy Reach, Transferred to Stronger Side, Report Given to Other Health Care Providers Regarding Fall Risk, Bed in Low Position, Communication Sign for Patients & Families, Mobility Assessed & Appropriate Sign Placed  Patient Turns/Positioning  Patient Turns Self from Side to Side  Patient Turns Assistance/Tolerance     Bed Positions  Bed Controls On, Bed Locked  Head of Bed Elevated  Self regulated      Psychosocial/Neurologic Assessment  Psychosocial Assessment  Psychosocial (WDL):  Within Defined Limits  Neurologic Assessment  Neuro (WDL): Exceptions to WDL  Level of Consciousness: Alert  Orientation Level: Oriented X4  Cognition: Appropriate judgement, Follows commands  Motor Function/Sensation Assessment: Sensation  RUE Sensation: Full sensation  LUE Sensation: Full sensation  RLE Sensation: Tingling  LLE Sensation: Full sensation  EENT (WDL):  WDL Except    Cardio/Pulmonary Assessment  Edema      Respiratory Breath Sounds     Cardiac Assessment   Cardiac (WDL):  Within Defined Limits

## 2023-10-27 NOTE — PROGRESS NOTES
NURSING DAILY NOTE    Name: Hieu Duong   Date of Admission: 10/25/2023   Admitting Diagnosis: Closed right hip fracture, initial encounter ([facility])  Attending Physician: Montserrat Farnsworth D.o.  Allergies: Tdap [dipth, acell pertus, tetanus]    Safety  Patient Assist     Patient Precautions  Fall Risk, Weight Bearing As Tolerated Right Lower Extremity  Precaution Comments     Bed Transfer Status  Contact Guard Assist (FWW level)  Toilet Transfer Status   Contact Guard Assist  Assistive Devices  Rails, Wheelchair, Gait Belt  Oxygen  None - Room Air  Diet/Therapeutic Dining  Current Diet Order   Procedures    Diet Order Diet: Consistent CHO (Diabetic)     Pill Administration  whole  Agitated Behavioral Scale     ABS Level of Severity       Fall Risk  Has the patient had a fall this admission?   No  Nancy Tracey Fall Risk Scoring  16, HIGH RISK  Fall Risk Safety Measures  bed alarm, chair alarm, and poor balance    Vitals  Temperature: 36.7 °C (98 °F)  Temp src: Oral  Pulse: 88  Respiration: 17  Blood Pressure : 109/65  Blood Pressure MAP (Calculated): 80 MM HG  BP Location: Left, Upper Arm  Patient BP Position: Supine     Oxygen  Pulse Oximetry: 95 %  O2 (LPM): 0  O2 Delivery Device: None - Room Air    Bowel and Bladder  Last Bowel Movement  10/26/23  Stool Type  Type 3: Like a sausage, but with cracks on its surface  Bowel Device     Continent  Bladder: Did not void   Bowel: No movement  Bladder Function  Urine Void (mL): 250 ml  Number of Times Voided: 1  Urine Color: Yellow  Number of Times Incontinent of Urine: 0  Genitourinary Assessment   Bladder Assessment (WDL):  WDL Except  Hagan Catheter: Not Applicable  Urine Color: Yellow  Number of Bladder Accidents: 0  Total Number of Bladder of Accidents in Last 7 Days: 0  Number of Times Incontinent of Urine: 0  Bladder Device: Urinal  Time Void: Yes  Bladder Scan: Post Void  $ Bladder Scan Results (mL):  285    Skin  West Score   19  Sensory Interventions   Bed Types: Standard/Trauma Mattress  Skin Preventative Measures: Waffle Overlay, Pillows in Use for Support / Positioning  Moisture Interventions         Pain  Pain Rating Scale  5 - Interrupts some activities  Pain Location  Hip  Pain Location Orientation  Right  Pain Interventions   Medication (see MAR)    ADLs    Bathing      Linen Change      Personal Hygiene     Chlorhexidine Bath      Oral Care     Teeth/Dentures     Shave     Nutrition Percentage Eaten  *  * Meal *  *, Lunch, Between % Consumed  Environmental Precautions     Patient Turns/Positioning     Patient Turns Assistance/Tolerance     Bed Positions     Head of Bed Elevated         Psychosocial/Neurologic Assessment  Psychosocial Assessment  Psychosocial (WDL):  Within Defined Limits  Neurologic Assessment  Neuro (WDL): Exceptions to WDL  Level of Consciousness: Alert  Orientation Level: Oriented X4  Cognition: Appropriate judgement, Follows commands  Motor Function/Sensation Assessment: Sensation  RUE Sensation: Full sensation  LUE Sensation: Full sensation  RLE Sensation: Tingling (foot)  LLE Sensation: Full sensation  EENT (WDL):  WDL Except    Cardio/Pulmonary Assessment  Edema      Respiratory Breath Sounds     Cardiac Assessment   Cardiac (WDL):  Within Defined Limits

## 2023-10-27 NOTE — PROGRESS NOTES
Hospital Medicine Daily Progress Note        Chief Complaint  Diabetes Mellitus    Interval Problem Update  Pt reports that his CGM historically has been about 60 points higher than hospital grade glucometers.  Reiterated to pt that we will provide insulin coverage based on [facility]'s glucometer readings.  Pt in agreement.    Review of Systems  Review of Systems   Constitutional:  Negative for chills and fever.   HENT: Negative.     Eyes: Negative.    Respiratory:  Negative for cough and shortness of breath.    Cardiovascular:  Negative for chest pain and palpitations.   Gastrointestinal:  Negative for abdominal pain, nausea and vomiting.   Musculoskeletal:  Positive for joint pain.   Skin:  Negative for itching and rash.   Endo/Heme/Allergies:  Negative for polydipsia. Does not bruise/bleed easily.        Physical Exam  Temp:  [36.7 °C (98 °F)-36.8 °C (98.2 °F)] 36.8 °C (98.2 °F)  Pulse:  [81-88] 84  Resp:  [17-18] 18  BP: (109-133)/(65-73) 133/73  SpO2:  [95 %-96 %] 96 %    Physical Exam  Vitals reviewed.   Constitutional:       General: He is not in acute distress.     Appearance: Normal appearance. He is not ill-appearing.   HENT:      Head: Normocephalic and atraumatic.      Right Ear: External ear normal.      Left Ear: External ear normal.      Nose: Nose normal.      Mouth/Throat:      Pharynx: Oropharynx is clear.   Eyes:      General:         Right eye: No discharge.         Left eye: No discharge.      Extraocular Movements: Extraocular movements intact.      Conjunctiva/sclera: Conjunctivae normal.   Cardiovascular:      Rate and Rhythm: Normal rate and regular rhythm.   Pulmonary:      Effort: Pulmonary effort is normal. No respiratory distress.      Breath sounds: Normal breath sounds. No wheezing.   Abdominal:      General: Bowel sounds are normal. There is no distension.      Palpations: Abdomen is soft.      Tenderness: There is no abdominal tenderness.   Musculoskeletal:      Cervical back: Normal  range of motion and neck supple.      Right lower leg: Edema present.      Left lower leg: No edema.   Skin:     General: Skin is warm and dry.   Neurological:      Mental Status: He is alert and oriented to person, place, and time.         Fluids    Intake/Output Summary (Last 24 hours) at 10/27/2023 1131  Last data filed at 10/27/2023 0539  Gross per 24 hour   Intake 120 ml   Output 700 ml   Net -580 ml       Laboratory  Recent Labs     10/25/23  0729 10/26/23  0559   WBC  --  6.4   RBC 3.25* 3.55*   HEMOGLOBIN 10.4* 11.2*   HEMATOCRIT 29.6* 33.2*   MCV 91.0 93.5   MCH 32.0 31.5   MCHC 35.2 33.7   RDW 13.6 45.6   PLATELETCT 208 278   MPV 8.1 9.8     Recent Labs     10/26/23  0559   SODIUM 134*   POTASSIUM 4.5   CHLORIDE 97   CO2 29   GLUCOSE 222*   BUN 18   CREATININE 0.54   CALCIUM 8.2*     Recent Labs     10/27/23  0558   INR 2.84*               Assessment/Plan  * Closed right hip fracture, initial encounter (HCC)- (present on admission)  Assessment & Plan  2/2 fall  S/P R hip hemiarthroplasty on 10/21/23 at [facility]  Wound care and pain control per Physiatry    Depression  Assessment & Plan  On Lexapro    Anemia  Assessment & Plan  Had post-op anemia  S/P PRBC at [facility]  Fe 23, started on supplement  Follow H/H    Azotemia  Assessment & Plan  Renal function resolved w/ PO fluids    Type 1 diabetes mellitus without complication (HCC)- (present on admission)  Assessment & Plan  HbA1c 6.3  Observe serum glucose trends on increased Lantus  Continue SSI  Outpt meds include Tresiba 14 u qam and SSI    History of CVA (cerebrovascular accident)- (present on admission)  Assessment & Plan  On Coumadin and Lipitor       Full Code    Discussed w/ pt, Therapies, and RN

## 2023-10-27 NOTE — CARE PLAN
The patient is Stable - Low risk of patient condition declining or worsening    Shift Goals  Clinical Goals: pain mgmt    Progress made toward(s) clinical / shift goals:    Problem: Knowledge Deficit - Standard  Goal: Patient and family/care givers will demonstrate understanding of plan of care, disease process/condition, diagnostic tests and medications  Outcome: Progressing     Problem: Pain - Standard  Goal: Alleviation of pain or a reduction in pain to the patient’s comfort goal  Outcome: Progressing       Patient is not progressing towards the following goals:

## 2023-10-27 NOTE — THERAPY
"Physical Therapy   Daily Treatment     Patient Name: Hieu Duong  Age:  68 y.o., Sex:  male  Medical Record #: [id number]  Today's Date: 10/27/2023     The following represents two non-consecutive sessions totaling 90 min, 14:30-15:00 and 15:30-16:30.    Precautions  Precautions: Fall Risk, Weight Bearing As Tolerated Right Lower Extremity, Posterior Hip Precautions    Subjective    \"I'd like to do my warm-up exercises first\"     Objective       10/27/23 1431   PT Charge Group   PT Therapeutic Exercise (Units) 2   PT Total Time Spent   PT Individual Total Time Spent (Mins) 30   Pain 0 - 10 Group   Pain Rating Scale (NPRS)   (pt reports pain is manageable)   Supine Lower Body Exercise   Bridges 1 set of 10;Two Legged  (3-5s holds)   Straight Leg Raises 1 set of 10;Bilateral  (15x LLE)   Heel Slide 1 set of 15;Bilateral   Ankle Pumps 3 sets of 10;Bilateral   Gluteal Isometrics 1 set of 15;Bilateral  (5s holds)   Quadriceps Isometrics 1 set of 15;Bilateral  (5s holds)   Interdisciplinary Plan of Care Collaboration   Patient Position at End of Therapy In Bed;Call Light within Reach;Tray Table within Reach            10/27/23 1531   PT Charge Group   PT Gait Training (Units) 1   PT Therapeutic Exercise (Units) 2   PT Therapeutic Activities (Units) 1   PT Total Time Spent   PT Individual Total Time Spent (Mins) 60   Pain   Intervention Declines  (ice)   Pain 0 - 10 Group   Pain Rating Scale (NPRS)   (pt primarily limited by R knee in weight bearing, MD made aware)   Gait Functional Level of Assist    Gait Level Of Assist Standby Assist   Assistive Device Front Wheel Walker   Distance (Feet) 100   # of Times Distance was Traveled 3   Deviation Antalgic;Bradykinetic  (cues for continuous FWW negotiation with improvement noted)   Transfer Functional Level of Assist   Toilet Transfers Standby Assist  (pt stands to use toilet)   Toilet Transfer Description Grab bar;Assist with two limbs;Supervision for safety   Sitting " Lower Body Exercises   Sit to Stand 1 set of 10  (with no UE support)   Standing Lower Body Exercises   Hamstring Curl 1 set of 10;Bilateral    Hip Flexion 1 set of 10;Bilateral  (SLR)   Hip Extension 1 set of 10;Bilateral    Hip Abduction 1 set of 10;Bilateral   Marching 1 set of 10  (BLE)   Heel Rise 1 set of 15;Bilateral   Mini Squat 1 set of 10;Partial   Other Exercises standing TKEs with green tband, RLE only 2x15 at FWW with SBA   Comments performed in // bars with SBA   Bed Mobility    Supine to Sit Standby Assist   Sit to Supine Standby Assist   Sit to Stand Standby Assist  (to FWW)   Interdisciplinary Plan of Care Collaboration   IDT Collaboration with  Physician   Patient Position at End of Therapy In Bed;Call Light within Reach;Tray Table within Reach   Collaboration Comments knee pain       Education provided on healing timeframe and expectations, benefits of therapy, knee pain and pain management.     Assessment    Pt highly motivated and participatory throughout. Limited by R knee pain, MD made aware.     Strengths: Able to follow instructions, Effective communication skills, Independent prior level of function, Motivated for self care and independence, Pleasant and cooperative, Supportive family, Willingly participates in therapeutic activities  Barriers: Generalized weakness, Home accessibility, Impaired activity tolerance, Impaired balance, Pain poorly managed, Pain    Plan    Ambulation with FWW  Reinforce LE strengthening  Transfers for consistent sequencing  Begin stair training    DME  PT DME Recommendations  Assistive Device: Front Wheeled Walker    Passport items to be completed:  Get in/out of bed safely, in/out of a vehicle, safely use mobility device, walk or wheel around home/community, navigate up and down stairs, show how to get up/down from the ground, ensure home is accessible, demonstrate HEP, complete caregiver training    Physical Therapy Problems (Active)       Problem: Mobility        Dates: Start:  10/26/23         Goal: STG-Within one week, patient will ambulate community distances x 150 feet with FWW and SBA       Dates: Start:  10/26/23            Goal: STG-Within one week, patient will ambulate up/down a curb with FWW and SBA       Dates: Start:  10/26/23               Problem: Mobility Transfers       Dates: Start:  10/26/23         Goal: STG-Within one week, patient will transfer bed to chair with FWW and SBA       Dates: Start:  10/26/23               Problem: PT-Long Term Goals       Dates: Start:  10/26/23         Goal: LTG-By discharge, patient will ambulate x 150 feet with FWW, mod I        Dates: Start:  10/26/23            Goal: LTG-By discharge, patient will transfer one surface to another with FWW, mod I        Dates: Start:  10/26/23            Goal: LTG-By discharge, patient will ambulate up/down 4-6 stairs with BHR and SPV       Dates: Start:  10/26/23            Goal: LTG-By discharge, patient will transfer in/out of a car with FWW, mod I        Dates: Start:  10/26/23

## 2023-10-27 NOTE — THERAPY
Occupational Therapy  Daily Treatment     Patient Name: Hieu Duong  Age:  68 y.o., Sex:  male  Medical Record #: [id number]  Today's Date: 10/27/2023     Precautions  Precautions: (P) Fall Risk, Weight Bearing As Tolerated Right Lower Extremity    Subjective    Patient in bed upon arrival, agreeable to participate in OT. C/o significant fatigue following meal prep task @ FWW level.     Patient endorsed congenital supination impairment (bilaterally), reported this is the reason he wipes from the front following BM.      Objective     10/27/23 1001   OT Charge Group   OT Self Care / ADL (Units) 2   OT Neuromuscular Re-education / Balance (Units) 2   OT Total Time Spent   OT Individual Total Time Spent (Mins) 60   Precautions   Precautions Fall Risk;Weight Bearing As Tolerated Right Lower Extremity/posterior hip precautions   Vitals   O2 Delivery Device None - Room Air   Cognition    Level of Consciousness Alert   Functional Level of Assist   Grooming Supervision;Standing   Toileting Minimal Assist  (for clothing management due to urge to have BM and pants/briefs too low following STS from toilet (discussed adaptive strategies patient can utilize if same scenario occurs))   Toilet Transfers Supervised  (FWW level)   IADL Treatments   IADL Treatments Meal preparation   Meal Preparation see note below for IADL performance details   Bed Mobility    Supine to Sit Standby Assist   Sit to Supine Standby Assist   Scooting Standby Assist   Rolling Standby Assist   Interdisciplinary Plan of Care Collaboration   IDT Collaboration with  Nursing;Physician   Patient Position at End of Therapy Seated;Self Releasing Lap Belt Applied;Call Light within Reach   Collaboration Comments blood sugar     IADLs: Patient completed kitchen mobility and stove top meal prep task @ SBA to supervision level overall. Successfully retrieved ingredients from refrigerator and necessary supplies from drawers,upper/lower cabinets w/ no overt LOB.  Occasional cues provided for adaptive strategies/safety considerations.     SBA to supervision for FWW mobility room > valdivia bathroom > ADL kitchen. Returned to room via w/c due to fatigue.       Assessment    Patient w/ good activity tolerance initially however c/o significant fatigue upon completion of standing meal prep task. SBA to supervision for stove top joanna prep task @ FWW level. Receptive to all recommendations re adaptive strategies/safety recommendations.   Strengths: Willingly participates in therapeutic activities, Supportive family, Pleasant and cooperative, Motivated for self care and independence, Independent prior level of function, Good insight into deficits/needs, Alert and oriented, Adequate strength, Effective communication skills    Plan    Blocked practice for pants over hips pursuit in standing (remind patient to pull pants over knees prior to standing)    ADLs  Standing tolerance/balance  Functional mobility w/ FWW  Strength/Endurance    DME  OT DME Recommendations  Bathroom Equipment:  (shower chair and grab bars)    Passport items to be completed:  Perform bathroom transfers, complete dressing, complete feeding, get ready for the day, prepare a simple meal, participate in household tasks, adapt home for safety needs, demonstrate home exercise program, complete caregiver training     Occupational Therapy Goals (Active)       There are no active problems.

## 2023-10-28 ENCOUNTER — APPOINTMENT (OUTPATIENT)
Dept: RADIOLOGY | Facility: REHABILITATION | Age: 68
DRG: 560 | End: 2023-10-28
Attending: PHYSICAL MEDICINE & REHABILITATION
Payer: MEDICARE

## 2023-10-28 ENCOUNTER — APPOINTMENT (OUTPATIENT)
Dept: OCCUPATIONAL THERAPY | Facility: REHABILITATION | Age: 68
DRG: 560 | End: 2023-10-28
Attending: PHYSICAL MEDICINE & REHABILITATION
Payer: MEDICARE

## 2023-10-28 ENCOUNTER — APPOINTMENT (OUTPATIENT)
Dept: PHYSICAL THERAPY | Facility: REHABILITATION | Age: 68
DRG: 560 | End: 2023-10-28
Attending: PHYSICAL MEDICINE & REHABILITATION
Payer: MEDICARE

## 2023-10-28 LAB
GLUCOSE BLD STRIP.AUTO-MCNC: 260 MG/DL (ref 65–99)
GLUCOSE BLD STRIP.AUTO-MCNC: 45 MG/DL (ref 65–99)
GLUCOSE BLD STRIP.AUTO-MCNC: 78 MG/DL (ref 65–99)
GLUCOSE BLD STRIP.AUTO-MCNC: 89 MG/DL (ref 65–99)

## 2023-10-28 PROCEDURE — 700102 HCHG RX REV CODE 250 W/ 637 OVERRIDE(OP): Performed by: HOSPITALIST

## 2023-10-28 PROCEDURE — 97116 GAIT TRAINING THERAPY: CPT | Mod: CQ

## 2023-10-28 PROCEDURE — 99231 SBSQ HOSP IP/OBS SF/LOW 25: CPT | Performed by: PHYSICAL MEDICINE & REHABILITATION

## 2023-10-28 PROCEDURE — 700102 HCHG RX REV CODE 250 W/ 637 OVERRIDE(OP): Performed by: PHYSICAL MEDICINE & REHABILITATION

## 2023-10-28 PROCEDURE — 82962 GLUCOSE BLOOD TEST: CPT

## 2023-10-28 PROCEDURE — 99232 SBSQ HOSP IP/OBS MODERATE 35: CPT | Performed by: HOSPITALIST

## 2023-10-28 PROCEDURE — A9270 NON-COVERED ITEM OR SERVICE: HCPCS | Performed by: HOSPITALIST

## 2023-10-28 PROCEDURE — 73562 X-RAY EXAM OF KNEE 3: CPT | Mod: RT

## 2023-10-28 PROCEDURE — A9270 NON-COVERED ITEM OR SERVICE: HCPCS | Performed by: PHYSICAL MEDICINE & REHABILITATION

## 2023-10-28 PROCEDURE — 97530 THERAPEUTIC ACTIVITIES: CPT | Mod: CQ

## 2023-10-28 PROCEDURE — 97535 SELF CARE MNGMENT TRAINING: CPT

## 2023-10-28 PROCEDURE — 770010 HCHG ROOM/CARE - REHAB SEMI PRIVAT*

## 2023-10-28 RX ORDER — DEXTROSE MONOHYDRATE 25 G/50ML
25 INJECTION, SOLUTION INTRAVENOUS
Status: DISCONTINUED | OUTPATIENT
Start: 2023-10-28 | End: 2023-10-31 | Stop reason: HOSPADM

## 2023-10-28 RX ADMIN — ACETAMINOPHEN 500 MG: 500 TABLET ORAL at 17:25

## 2023-10-28 RX ADMIN — ACETAMINOPHEN 500 MG: 500 TABLET ORAL at 15:05

## 2023-10-28 RX ADMIN — OXYCODONE HYDROCHLORIDE 5 MG: 5 TABLET ORAL at 05:30

## 2023-10-28 RX ADMIN — ATORVASTATIN CALCIUM 20 MG: 10 TABLET, FILM COATED ORAL at 21:20

## 2023-10-28 RX ADMIN — ESCITALOPRAM OXALATE 10 MG: 10 TABLET ORAL at 21:20

## 2023-10-28 RX ADMIN — ACETAMINOPHEN 500 MG: 500 TABLET ORAL at 05:30

## 2023-10-28 RX ADMIN — OXYCODONE HYDROCHLORIDE 5 MG: 5 TABLET ORAL at 12:14

## 2023-10-28 RX ADMIN — OXYCODONE HYDROCHLORIDE 5 MG: 5 TABLET ORAL at 15:05

## 2023-10-28 RX ADMIN — INSULIN HUMAN 6 UNITS: 100 INJECTION, SOLUTION PARENTERAL at 12:11

## 2023-10-28 RX ADMIN — OMEPRAZOLE 40 MG: 20 CAPSULE, DELAYED RELEASE ORAL at 08:12

## 2023-10-28 RX ADMIN — OXYCODONE HYDROCHLORIDE AND ACETAMINOPHEN 500 MG: 500 TABLET ORAL at 08:05

## 2023-10-28 RX ADMIN — OXYCODONE HYDROCHLORIDE 5 MG: 5 TABLET ORAL at 21:20

## 2023-10-28 RX ADMIN — Medication 6 MG: at 21:20

## 2023-10-28 RX ADMIN — Medication 324 MG: at 08:05

## 2023-10-28 RX ADMIN — OXYCODONE HYDROCHLORIDE 5 MG: 5 TABLET ORAL at 03:30

## 2023-10-28 RX ADMIN — ACETAMINOPHEN 500 MG: 500 TABLET ORAL at 08:12

## 2023-10-28 RX ADMIN — OXYCODONE HYDROCHLORIDE 5 MG: 5 TABLET ORAL at 23:40

## 2023-10-28 RX ADMIN — OXYCODONE HYDROCHLORIDE 5 MG: 5 TABLET ORAL at 17:24

## 2023-10-28 RX ADMIN — WARFARIN SODIUM 2.5 MG: 2.5 TABLET ORAL at 17:24

## 2023-10-28 ASSESSMENT — GAIT ASSESSMENTS
ASSISTIVE DEVICE: FRONT WHEEL WALKER
GAIT LEVEL OF ASSIST: STANDBY ASSIST
DEVIATION: ANTALGIC;BRADYKINETIC

## 2023-10-28 ASSESSMENT — ACTIVITIES OF DAILY LIVING (ADL)
BED_CHAIR_WHEELCHAIR_TRANSFER_DESCRIPTION: INCREASED TIME;SET-UP OF EQUIPMENT;SUPERVISION FOR SAFETY;VERBAL CUEING
TOILET_TRANSFER_DESCRIPTION: GRAB BAR;INCREASED TIME;INITIAL PREPARATION FOR TASK;SET-UP OF EQUIPMENT;SUPERVISION FOR SAFETY
TOILETING_LEVEL_OF_ASSIST_DESCRIPTION: ASSIST FOR STANDING BALANCE;GRAB BAR;INCREASED TIME;INITIAL PREPARATION FOR TASK;SET-UP OF EQUIPMENT;SUPERVISION FOR SAFETY
TUB_SHOWER_TRANSFER_DESCRIPTION: GRAB BAR;SHOWER BENCH;INCREASED TIME;INITIAL PREPARATION FOR TASK;SET-UP OF EQUIPMENT;SUPERVISION FOR SAFETY
BED_CHAIR_WHEELCHAIR_TRANSFER_DESCRIPTION: ADAPTIVE EQUIPMENT;INITIAL PREPARATION FOR TASK;SUPERVISION FOR SAFETY

## 2023-10-28 ASSESSMENT — ENCOUNTER SYMPTOMS
FEVER: 0
CHILLS: 0
SHORTNESS OF BREATH: 0
VOMITING: 0
ABDOMINAL PAIN: 0
COUGH: 0
NAUSEA: 0
PALPITATIONS: 0
EYES NEGATIVE: 1
POLYDIPSIA: 0
BRUISES/BLEEDS EASILY: 0

## 2023-10-28 ASSESSMENT — PAIN DESCRIPTION - PAIN TYPE: TYPE: ACUTE PAIN

## 2023-10-28 NOTE — THERAPY
Occupational Therapy  Daily Treatment     Patient Name: Hieu Duong  Age:  68 y.o., Sex:  male  Medical Record #: [id number]  Today's Date: 10/28/2023     Precautions  Precautions: Fall Risk, Weight Bearing As Tolerated Right Lower Extremity, Posterior Hip Precautions    Subjective    Pt pleasant and motivated to participate in OT, requesting to take a shower     Objective     10/28/23 1031   OT Charge Group   Charges Yes   OT Self Care / ADL (Units) 4   OT Total Time Spent   OT Individual Total Time Spent (Mins) 60   Precautions   Precautions Fall Risk;Weight Bearing As Tolerated Right Lower Extremity;Posterior Hip Precautions   Vitals   Pulse 84   Patient BP Position Sitting   Blood Pressure  139/72   Pulse Oximetry 96 %   O2 Delivery Device None - Room Air   Pain   Intervention Medication (see MAR)   Pain 0 - 10 Group   Location Hip   Location Orientation Right   Cognition    Level of Consciousness Alert   ABS (Agitated Behavior Scale)   Agitated Behavior Scale Performed No   Sleep/Wake Cycle   Sleep & Rest Resting   Vision Screen   Vision   (Pt wears glasses)   Functional Level of Assist   Grooming Supervision;Seated   Grooming Description Increased time;Initial preparation for task;Seated in wheelchair at sink;Set-up of equipment;Supervision for safety;Verbal cueing   Bathing Minimal Assist   Bathing Description Grab bar;Hand held shower;Assit with back;Long handled bath tool;Increased time;Initial preparation for task;Set-up of equipment;Set up for shower sleeve;Supervision for safety   Upper Body Dressing Stand by Assist   Upper Body Dressing Description Increased time;Initial preparation for task;Supervision for safety   Lower Body Dressing Contact Guard Assist   Lower Body Dressing Description Grab bar;Reacher;Sock aid;Increased time;Initial preparation for task;Set-up of equipment;Supervision for safety;Verbal cueing   Toileting Standby Assist   Toileting Description Assist for standing balance;Grab  bar;Increased time;Initial preparation for task;Set-up of equipment;Supervision for safety   Bed, Chair, Wheelchair Transfer Standby Assist   Bed Chair Wheelchair Transfer Description Increased time;Set-up of equipment;Supervision for safety;Verbal cueing   Toilet Transfers Standby Assist   Toilet Transfer Description Grab bar;Increased time;Initial preparation for task;Set-up of equipment;Supervision for safety  (BSC over toilet)   Tub / Shower Transfers Contact Guard Assist   Tub Shower Transfer Description Grab bar;Shower bench;Increased time;Initial preparation for task;Set-up of equipment;Supervision for safety   Bed Mobility    Supine to Sit Standby Assist   Sit to Supine Standby Assist   Scooting Standby Assist   Rolling Standby Assist   Interdisciplinary Plan of Care Collaboration   Patient Position at End of Therapy In Bed;Bed Alarm On;Call Light within Reach;Tray Table within Reach;Phone within Reach   Strengths & Barriers   Strengths Willingly participates in therapeutic activities;Supportive family;Pleasant and cooperative;Motivated for self care and independence;Independent prior level of function;Good insight into deficits/needs;Alert and oriented;Adequate strength;Effective communication skills     Assessment    Pt seen for tx, addressing shower routine. Pt tolerated activity well w/ some pain and dizziness toward the end of session. Vitals taken WFL. Pt progressing towards goals. Continue OT POC.    Strengths: Willingly participates in therapeutic activities, Supportive family, Pleasant and cooperative, Motivated for self care and independence, Independent prior level of function, Good insight into deficits/needs, Alert and oriented, Adequate strength, Effective communication skills    Plan    ADLs - pants over knees prior to standing  Standing tolerance/balance  Functional mobility w/ FWW  Strength/Endurance    DME  OT DME Recommendations  Bathroom Equipment:  (shower chair and grab  bars)    Passport items to be completed:  Perform bathroom transfers, complete dressing, complete feeding, get ready for the day, prepare a simple meal, participate in household tasks, adapt home for safety needs, demonstrate home exercise program, complete caregiver training     Occupational Therapy Goals (Active)       There are no active problems.

## 2023-10-28 NOTE — THERAPY
Physical Therapy   Daily Treatment     Patient Name: Hieu Duong  Age:  68 y.o., Sex:  male  Medical Record #: [id number]  Today's Date: 10/28/2023     Precautions  Precautions: Fall Risk, Weight Bearing As Tolerated Right Lower Extremity, Posterior Hip Precautions    Subjective    Pt was in bed w/ SANDI presented, agreeable to session.     Objective       10/28/23 1231   PT Charge Group   PT Gait Training (Units) 2   PT Therapeutic Activities (Units) 2   Supervising Physical Therapist Ebony Tracey   PT Total Time Spent   PT Individual Total Time Spent (Mins) 60   Cognition    Level of Consciousness Alert   Gait Functional Level of Assist    Gait Level Of Assist Standby Assist  (to SPV)   Assistive Device Front Wheel Walker   Distance (Feet)   (240+160)   # of Times Distance was Traveled 2   Deviation Antalgic;Bradykinetic   Transfer Functional Level of Assist   Bed, Chair, Wheelchair Transfer Supervised   Bed Chair Wheelchair Transfer Description Adaptive equipment;Initial preparation for task;Supervision for safety  (SPT w/ FWW)   Bed Mobility    Supine to Sit Supervised   Sit to Supine Supervised   Sit to Stand Standby Assist  (FWW)   Scooting Supervised   Interdisciplinary Plan of Care Collaboration   IDT Collaboration with  Family / Caregiver   Patient Position at End of Therapy In Bed;Tray Table within Reach;Call Light within Reach;Phone within Reach   Collaboration Comments SANDI presented in room at the beginning of session     Pt completed static/dynamic standing balance by the edge of table while playing guitar and harmonica at the same time. 5 mins x2 w/ SBA/SPV for balance.    Assessment    Pt tolerated session well,  was very pleasant and is progressing towards SPV. R knee pain continues to be the barrier and he stated he is getting xray later today.       Strengths: Able to follow instructions, Effective communication skills, Independent prior level of function, Motivated for self care and  independence, Pleasant and cooperative, Supportive family, Willingly participates in therapeutic activities  Barriers: Generalized weakness, Home accessibility, Impaired activity tolerance, Impaired balance, Pain poorly managed, Pain    Plan    Ambulation with FWW  Reinforce LE strengthening  Transfers for consistent sequencing  Begin stair training    DME  PT DME Recommendations  Assistive Device: Front Wheeled Walker    Passport items to be completed:  Get in/out of bed safely, in/out of a vehicle, safely use mobility device, walk or wheel around home/community, navigate up and down stairs, show how to get up/down from the ground, ensure home is accessible, demonstrate HEP, complete caregiver training    Physical Therapy Problems (Active)       Problem: Mobility       Dates: Start:  10/26/23         Goal: STG-Within one week, patient will ambulate community distances x 150 feet with FWW and SBA       Dates: Start:  10/26/23            Goal: STG-Within one week, patient will ambulate up/down a curb with FWW and SBA       Dates: Start:  10/26/23               Problem: Mobility Transfers       Dates: Start:  10/26/23         Goal: STG-Within one week, patient will transfer bed to chair with FWW and SBA       Dates: Start:  10/26/23               Problem: PT-Long Term Goals       Dates: Start:  10/26/23         Goal: LTG-By discharge, patient will ambulate x 150 feet with FWW, mod I        Dates: Start:  10/26/23            Goal: LTG-By discharge, patient will transfer one surface to another with FWW, mod I        Dates: Start:  10/26/23            Goal: LTG-By discharge, patient will ambulate up/down 4-6 stairs with BHR and SPV       Dates: Start:  10/26/23            Goal: LTG-By discharge, patient will transfer in/out of a car with FWW, mod I        Dates: Start:  10/26/23

## 2023-10-28 NOTE — CARE PLAN
"  Problem: Pain - Standard  Goal: Alleviation of pain or a reduction in pain to the patient’s comfort goal  Note: Educate patient of non-pharmacological comfort measures: repositioning, relaxation/breathing technique, cold compress and activities.      Problem: Fall Risk - Rehab  Goal: Patient will remain free from falls  Note: Nancy Tracey Fall risk Assessment Score: 16    High fall risk Interventions   - Alarming seatbelt  - Wander guard  - Bed and strip alarm   - Yellow sign by the door   - Yellow wrist band \"Fall risk\"  - Room near to the nurse station  - Do not leave patient unattended in the bathroom  - Fall risk education provided      Problem: Diabetes Management  Goal: Patient's ability to maintain appropriate glucose levels will be maintained or improve  Note: FS ac and hs. Insulin given as ordered. Will monitor.          The patient is Stable - Low risk of patient condition declining or worsening    Shift Goals  Clinical Goals: safety, pain mgt        "

## 2023-10-28 NOTE — PROGRESS NOTES
Hospital Medicine Daily Progress Note        Chief Complaint  Diabetes Mellitus    Interval Problem Update  FSBS tend to be lowest in the mornings and trend upwards throughout the day.    Review of Systems  Review of Systems   Constitutional:  Negative for chills and fever.   HENT: Negative.     Eyes: Negative.    Respiratory:  Negative for cough and shortness of breath.    Cardiovascular:  Negative for chest pain and palpitations.   Gastrointestinal:  Negative for abdominal pain, nausea and vomiting.   Musculoskeletal:  Positive for joint pain.   Skin:  Negative for itching and rash.   Endo/Heme/Allergies:  Negative for polydipsia. Does not bruise/bleed easily.        Physical Exam  Temp:  [36.4 °C (97.5 °F)-37 °C (98.6 °F)] 36.4 °C (97.5 °F)  Pulse:  [82-85] 85  Resp:  [18] 18  BP: (100-135)/(59-66) 135/66  SpO2:  [95 %-96 %] 96 %    Physical Exam  Vitals reviewed.   Constitutional:       General: He is not in acute distress.     Appearance: Normal appearance. He is not ill-appearing.   HENT:      Head: Normocephalic and atraumatic.      Right Ear: External ear normal.      Left Ear: External ear normal.      Nose: Nose normal.      Mouth/Throat:      Pharynx: Oropharynx is clear.   Eyes:      General:         Right eye: No discharge.         Left eye: No discharge.      Extraocular Movements: Extraocular movements intact.      Conjunctiva/sclera: Conjunctivae normal.   Cardiovascular:      Rate and Rhythm: Normal rate and regular rhythm.   Pulmonary:      Effort: Pulmonary effort is normal. No respiratory distress.      Breath sounds: Normal breath sounds. No wheezing.   Abdominal:      General: Bowel sounds are normal. There is no distension.      Palpations: Abdomen is soft.      Tenderness: There is no abdominal tenderness.   Musculoskeletal:      Cervical back: Normal range of motion and neck supple.      Right lower leg: Edema present.      Left lower leg: No edema.   Skin:     General: Skin is warm and dry.    Neurological:      Mental Status: He is alert and oriented to person, place, and time.         Fluids    Intake/Output Summary (Last 24 hours) at 10/28/2023 0949  Last data filed at 10/28/2023 0900  Gross per 24 hour   Intake 2780 ml   Output 1220 ml   Net 1560 ml       Laboratory  Recent Labs     10/26/23  0559   WBC 6.4   RBC 3.55*   HEMOGLOBIN 11.2*   HEMATOCRIT 33.2*   MCV 93.5   MCH 31.5   MCHC 33.7   RDW 45.6   PLATELETCT 278   MPV 9.8     Recent Labs     10/26/23  0559   SODIUM 134*   POTASSIUM 4.5   CHLORIDE 97   CO2 29   GLUCOSE 222*   BUN 18   CREATININE 0.54   CALCIUM 8.2*     Recent Labs     10/27/23  0558   INR 2.84*               Assessment/Plan  * Closed right hip fracture, initial encounter (HCC)- (present on admission)  Assessment & Plan  2/2 fall  S/P R hip hemiarthroplasty on 10/21/23 at [facility]  Wound care and pain control per Physiatry    Depression  Assessment & Plan  On Lexapro    Anemia  Assessment & Plan  Had post-op anemia  S/P PRBC at [facility]  Fe 23, continue supplement  Follow H/H    Azotemia  Assessment & Plan  Renal function resolved w/ PO fluids    Type 1 diabetes mellitus without complication (HCC)- (present on admission)  Assessment & Plan  HbA1c 6.3  Continue to titrate up qam Lantus for diurnal hyperglycemic trends  Will discontinue qhs SSI coverage for morning hypoglycemia  Outpt meds include Tresiba 14 u qam and SSI    History of CVA (cerebrovascular accident)- (present on admission)  Assessment & Plan  On Coumadin and Lipitor       Full Code

## 2023-10-28 NOTE — PROGRESS NOTES
NURSING DAILY NOTE    Name: Hieu Duong   Date of Admission: 10/25/2023   Admitting Diagnosis: Closed right hip fracture, initial encounter ([facility])  Attending Physician: Montserrat Farnsworth D.o.  Allergies: Tdap [dipth, acell pertus, tetanus]    Safety  Patient Assist     Patient Precautions  Fall Risk, Weight Bearing As Tolerated Right Lower Extremity, Posterior Hip Precautions  Precaution Comments     Bed Transfer Status  Standby Assist  Toilet Transfer Status   Standby Assist (pt stands to use toilet)  Assistive Devices  Rails  Oxygen  None - Room Air  Diet/Therapeutic Dining  Current Diet Order   Procedures    Diet Order Diet: Consistent CHO (Diabetic)     Pill Administration  whole  Agitated Behavioral Scale     ABS Level of Severity       Fall Risk  Has the patient had a fall this admission?   No  Nancy Tracey Fall Risk Scoring  16, HIGH RISK  Fall Risk Safety Measures  bed alarm, chair alarm, and low vision/ hearing    Vitals  Temperature: 37 °C (98.6 °F)  Temp src: Temporal  Pulse: 83  Respiration: 18  Blood Pressure : 113/59  Blood Pressure MAP (Calculated): 77 MM HG  BP Location: Left, Upper Arm  Patient BP Position: Felix's Position     Oxygen  Pulse Oximetry: 95 %  O2 (LPM): 0  O2 Delivery Device: None - Room Air    Bowel and Bladder  Last Bowel Movement  10/26/23  Stool Type  Type 5: Soft blob with clear cut edges (passed easily)  Bowel Device     Continent  Bladder: Did not void   Bowel: No movement  Bladder Function  Urine Void (mL): 100 ml  Number of Times Voided: 1  Urine Color: Yellow  Number of Times Incontinent of Urine: 0  Genitourinary Assessment   Bladder Assessment (WDL):  WDL Except  Hagan Catheter: Not Applicable  Urine Color: Yellow  Number of Bladder Accidents: 0  Total Number of Bladder of Accidents in Last 7 Days: 0  Number of Times Incontinent of Urine: 0  Bladder Device: Urinal  Time Void: Yes  Bladder Scan: Post Void  $  Bladder Scan Results (mL): 285    Skin  West Score   19  Sensory Interventions   Bed Types: Standard/Trauma Mattress  Skin Preventative Measures: Pillows in Use for Support / Positioning, Waffle Overlay  Moisture Interventions         Pain  Pain Rating Scale  4 - Distracts me, can do usual activities  Pain Location  Hip  Pain Location Orientation  Right  Pain Interventions   Medication (see MAR)    ADLs    Bathing      Linen Change      Personal Hygiene     Chlorhexidine Bath      Oral Care  Brushed Teeth  Teeth/Dentures     Shave     Nutrition Percentage Eaten  Lunch, Between % Consumed  Environmental Precautions  Treaded Slipper Socks on Patient, Personal Belongings, Wastebasket, Call Bell etc. in Easy Reach, Transferred to Stronger Side, Report Given to Other Health Care Providers Regarding Fall Risk, Bed in Low Position, Communication Sign for Patients & Families, Mobility Assessed & Appropriate Sign Placed  Patient Turns/Positioning  Patient Turns Self from Side to Side  Patient Turns Assistance/Tolerance     Bed Positions  Bed Controls On, Bed Locked  Head of Bed Elevated  Self regulated      Psychosocial/Neurologic Assessment  Psychosocial Assessment  Psychosocial (WDL):  Within Defined Limits  Neurologic Assessment  Neuro (WDL): Exceptions to WDL  Level of Consciousness: Alert  Orientation Level: Oriented X4  Cognition: Appropriate judgement, Follows commands  Motor Function/Sensation Assessment: Sensation  RUE Sensation: Full sensation  LUE Sensation: Full sensation  RLE Sensation:  (foot)  LLE Sensation: Full sensation  EENT (WDL):  WDL Except    Cardio/Pulmonary Assessment  Edema      Respiratory Breath Sounds     Cardiac Assessment   Cardiac (WDL):  Within Defined Limits

## 2023-10-28 NOTE — CARE PLAN
The patient is Stable - Low risk of patient condition declining or worsening    Shift Goals  Clinical Goals: safety, pain mgt    Progress made toward(s) clinical / shift goals:  2    Problem: Pain - Standard  Goal: Alleviation of pain or a reduction in pain to the patient’s comfort goal  Outcome: Progressing: Scheduled pain medication administered per MAR with pt report adequate reduction in pain. Participating in therapies.     Problem: Hemodynamics  Goal: Patient's hemodynamics, fluid balance and neurologic status will be stable or improve  Outcome: Progressing

## 2023-10-28 NOTE — PROGRESS NOTES
NURSING DAILY NOTE    Name: Hieu Duong   Date of Admission: 10/25/2023   Admitting Diagnosis: Closed right hip fracture, initial encounter ([facility])  Attending Physician: Montserrat Farnsworth D.o.  Allergies: Tdap [dipth, acell pertus, tetanus]    Safety  Patient Assist     Patient Precautions  Fall Risk, Weight Bearing As Tolerated Right Lower Extremity, Posterior Hip Precautions  Precaution Comments     Bed Transfer Status  Standby Assist  Toilet Transfer Status   Standby Assist (pt stands to use toilet)  Assistive Devices  Rails, Wheelchair, Gait Belt  Oxygen  None - Room Air  Diet/Therapeutic Dining  Current Diet Order   Procedures    Diet Order Diet: Consistent CHO (Diabetic)     Pill Administration  whole  Agitated Behavioral Scale     ABS Level of Severity       Fall Risk  Has the patient had a fall this admission?   No  Nancy Tracey Fall Risk Scoring  16, HIGH RISK  Fall Risk Safety Measures  bed alarm, chair alarm, and low vision/ hearing    Vitals  Temperature: 36.4 °C (97.6 °F)  Temp src: Temporal  Pulse: 82  Respiration: 18  Blood Pressure : 100/62  Blood Pressure MAP (Calculated): 75 MM HG  BP Location: Left, Upper Arm  Patient BP Position: Supine     Oxygen  Pulse Oximetry: 96 %  O2 (LPM): 0  O2 Delivery Device: None - Room Air    Bowel and Bladder  Last Bowel Movement  10/26/23  Stool Type  Type 5: Soft blob with clear cut edges (passed easily)  Bowel Device     Continent  Bladder: Did not void   Bowel: No movement  Bladder Function  Urine Void (mL): 120 ml  Number of Times Voided: 1  Urine Color: Straw  Number of Times Incontinent of Urine: 0  Genitourinary Assessment   Bladder Assessment (WDL):  WDL Except  Hagan Catheter: Not Applicable  Urine Color: Straw  Number of Bladder Accidents: 0  Total Number of Bladder of Accidents in Last 7 Days: 0  Number of Times Incontinent of Urine: 0  Bladder Device: Urinal  Time Void: Yes  Bladder Scan:  Post Void  $ Bladder Scan Results (mL): 285    Skin  West Score   19  Sensory Interventions   Bed Types: Standard/Trauma Mattress  Skin Preventative Measures: Waffle Overlay, Pillows in Use for Support / Positioning  Moisture Interventions         Pain  Pain Rating Scale  4 - Distracts me, can do usual activities  Pain Location  Hip  Pain Location Orientation  Right  Pain Interventions   Declines (ice)    ADLs    Bathing      Linen Change      Personal Hygiene     Chlorhexidine Bath      Oral Care  Brushed Teeth  Teeth/Dentures     Shave     Nutrition Percentage Eaten  Lunch, Between % Consumed  Environmental Precautions  Treaded Slipper Socks on Patient, Personal Belongings, Wastebasket, Call Bell etc. in Easy Reach, Transferred to Stronger Side, Report Given to Other Health Care Providers Regarding Fall Risk, Bed in Low Position, Communication Sign for Patients & Families, Mobility Assessed & Appropriate Sign Placed  Patient Turns/Positioning  Patient Turns Self from Side to Side  Patient Turns Assistance/Tolerance     Bed Positions  Bed Controls On, Bed Locked  Head of Bed Elevated  Self regulated      Psychosocial/Neurologic Assessment  Psychosocial Assessment  Psychosocial (WDL):  Within Defined Limits  Neurologic Assessment  Neuro (WDL): Exceptions to WDL  Level of Consciousness: Alert  Orientation Level: Oriented X4  Cognition: Appropriate judgement, Follows commands  Motor Function/Sensation Assessment: Sensation  RUE Sensation: Full sensation  LUE Sensation: Full sensation  RLE Sensation:  (foot)  LLE Sensation: Full sensation  EENT (WDL):  WDL Except    Cardio/Pulmonary Assessment  Edema      Respiratory Breath Sounds     Cardiac Assessment   Cardiac (WDL):  Within Defined Limits

## 2023-10-28 NOTE — CARE PLAN
The patient is Stable - Low risk of patient condition declining or worsening    Problem: Knowledge Deficit - Standard  Goal: Patient and family/care givers will demonstrate understanding of plan of care, disease process/condition, diagnostic tests and medications  Outcome: Progressing. Reviewed POC, all questions answered.        Problem: Skin Integrity  Goal: Skin integrity is maintained or improved  Outcome: Progressing. Pt's skin remains free from new or accidental injury.        Shift Goals  Clinical Goals: Safety  Patient Goals: Participate in therapy

## 2023-10-29 ENCOUNTER — APPOINTMENT (OUTPATIENT)
Dept: PHYSICAL THERAPY | Facility: REHABILITATION | Age: 68
DRG: 560 | End: 2023-10-29
Attending: PHYSICAL MEDICINE & REHABILITATION
Payer: MEDICARE

## 2023-10-29 LAB
GLUCOSE BLD STRIP.AUTO-MCNC: 103 MG/DL (ref 65–99)
GLUCOSE BLD STRIP.AUTO-MCNC: 114 MG/DL (ref 65–99)
GLUCOSE BLD STRIP.AUTO-MCNC: 140 MG/DL (ref 65–99)
GLUCOSE BLD STRIP.AUTO-MCNC: 168 MG/DL (ref 65–99)
GLUCOSE BLD STRIP.AUTO-MCNC: 84 MG/DL (ref 65–99)

## 2023-10-29 PROCEDURE — 770010 HCHG ROOM/CARE - REHAB SEMI PRIVAT*

## 2023-10-29 PROCEDURE — 97150 GROUP THERAPEUTIC PROCEDURES: CPT

## 2023-10-29 PROCEDURE — 99231 SBSQ HOSP IP/OBS SF/LOW 25: CPT | Performed by: PHYSICAL MEDICINE & REHABILITATION

## 2023-10-29 PROCEDURE — 82962 GLUCOSE BLOOD TEST: CPT | Mod: 91

## 2023-10-29 PROCEDURE — A9270 NON-COVERED ITEM OR SERVICE: HCPCS | Performed by: HOSPITALIST

## 2023-10-29 PROCEDURE — 97530 THERAPEUTIC ACTIVITIES: CPT

## 2023-10-29 PROCEDURE — 97110 THERAPEUTIC EXERCISES: CPT

## 2023-10-29 PROCEDURE — 99232 SBSQ HOSP IP/OBS MODERATE 35: CPT | Performed by: HOSPITALIST

## 2023-10-29 PROCEDURE — 700102 HCHG RX REV CODE 250 W/ 637 OVERRIDE(OP): Performed by: HOSPITALIST

## 2023-10-29 PROCEDURE — A9270 NON-COVERED ITEM OR SERVICE: HCPCS | Performed by: PHYSICAL MEDICINE & REHABILITATION

## 2023-10-29 PROCEDURE — 97116 GAIT TRAINING THERAPY: CPT

## 2023-10-29 PROCEDURE — 700102 HCHG RX REV CODE 250 W/ 637 OVERRIDE(OP): Performed by: PHYSICAL MEDICINE & REHABILITATION

## 2023-10-29 RX ADMIN — INSULIN GLARGINE-YFGN 18 UNITS: 100 INJECTION, SOLUTION SUBCUTANEOUS at 08:50

## 2023-10-29 RX ADMIN — ACETAMINOPHEN 500 MG: 500 TABLET ORAL at 13:20

## 2023-10-29 RX ADMIN — WARFARIN SODIUM 5 MG: 5 TABLET ORAL at 17:25

## 2023-10-29 RX ADMIN — Medication 324 MG: at 08:48

## 2023-10-29 RX ADMIN — OXYCODONE HYDROCHLORIDE 5 MG: 5 TABLET ORAL at 17:26

## 2023-10-29 RX ADMIN — OXYCODONE HYDROCHLORIDE 5 MG: 5 TABLET ORAL at 02:53

## 2023-10-29 RX ADMIN — OXYCODONE HYDROCHLORIDE 5 MG: 5 TABLET ORAL at 20:10

## 2023-10-29 RX ADMIN — ATORVASTATIN CALCIUM 20 MG: 10 TABLET, FILM COATED ORAL at 20:10

## 2023-10-29 RX ADMIN — OXYCODONE HYDROCHLORIDE 5 MG: 5 TABLET ORAL at 06:15

## 2023-10-29 RX ADMIN — OXYCODONE HYDROCHLORIDE 5 MG: 5 TABLET ORAL at 13:19

## 2023-10-29 RX ADMIN — OXYCODONE HYDROCHLORIDE 5 MG: 5 TABLET ORAL at 11:16

## 2023-10-29 RX ADMIN — ACETAMINOPHEN 500 MG: 500 TABLET ORAL at 17:25

## 2023-10-29 RX ADMIN — Medication 6 MG: at 20:11

## 2023-10-29 RX ADMIN — INSULIN HUMAN 2 UNITS: 100 INJECTION, SOLUTION PARENTERAL at 11:23

## 2023-10-29 RX ADMIN — ACETAMINOPHEN 500 MG: 500 TABLET ORAL at 06:16

## 2023-10-29 RX ADMIN — OMEPRAZOLE 40 MG: 20 CAPSULE, DELAYED RELEASE ORAL at 08:48

## 2023-10-29 RX ADMIN — ACETAMINOPHEN 500 MG: 500 TABLET ORAL at 08:48

## 2023-10-29 RX ADMIN — OXYCODONE HYDROCHLORIDE AND ACETAMINOPHEN 500 MG: 500 TABLET ORAL at 08:48

## 2023-10-29 RX ADMIN — ESCITALOPRAM OXALATE 10 MG: 10 TABLET ORAL at 20:11

## 2023-10-29 ASSESSMENT — FIBROSIS 4 INDEX: FIB4 SCORE: 1.48

## 2023-10-29 ASSESSMENT — PAIN DESCRIPTION - PAIN TYPE
TYPE: ACUTE PAIN
TYPE: ACUTE PAIN

## 2023-10-29 ASSESSMENT — ENCOUNTER SYMPTOMS
POLYDIPSIA: 0
CHILLS: 0
FEVER: 0
VOMITING: 0
NAUSEA: 0
PALPITATIONS: 0
BRUISES/BLEEDS EASILY: 0
SHORTNESS OF BREATH: 0
COUGH: 0
EYES NEGATIVE: 1
ABDOMINAL PAIN: 0

## 2023-10-29 NOTE — CARE PLAN
Problem: Pain - Standard  Goal: Alleviation of pain or a reduction in pain to the patient’s comfort goal  Outcome: Progressing  Patient verbalized understanding to alert staff prior to pain becoming unbearable and to participate in his pain management regimen.     Problem: Fall Risk - Rehab  Goal: Patient will remain free from falls  Outcome: Progressing  Patient verbalized understanding to notify staff for needs and for ambulating/toileting.     The patient is Stable - Low risk of patient condition declining or worsening    Shift Goals  Clinical Goals: Safety  Patient Goals: Pain management    Progress made toward(s) clinical / shift goals:  Fall risk reduction, pain management.    Patient is not progressing towards the following goals:

## 2023-10-29 NOTE — THERAPY
Physical Therapy   Daily Treatment     Patient Name: Hieu Duong  Age:  68 y.o., Sex:  male  Medical Record #: [id number]  Today's Date: 10/29/2023     Precautions  Precautions: Fall Risk, Weight Bearing As Tolerated Right Lower Extremity, Posterior Hip Precautions    Subjective    Pt reports feeling good.     Objective       10/29/23 1331   PT Charge Group   PT Group Therapy Group Activities   PT Gait Training (Units) 1   PT Therapeutic Exercise (Units) 1   PT Therapeutic Activities (Units) 2   PT Total Time Spent   PT Group Total Time Spent (Mins) 60   Reason for Group Therapy   Reason for Group Therapy To Enhance Motivation;To Validate Increased Edgar with Skills;To Reinforce Strengthening and Movement Patterns through Group Format Demonstration       Seated therex BLE:  - 30x ankle pumps  - 10x LAQ   - 10x marches    Standing therex with BUE support:   - 10x B hip abduction     Pt stands at table for ~ 20-25 min with intermittent seated rest breaks to play jenga. SV provided throughout, no LOB.     Pt ambulates 3x bouts of 100-150ft with FWW and SV.     Assessment    Pt tolerated session well.     Strengths: Able to follow instructions, Effective communication skills, Independent prior level of function, Motivated for self care and independence, Pleasant and cooperative, Supportive family, Willingly participates in therapeutic activities  Barriers: Generalized weakness, Home accessibility, Impaired activity tolerance, Impaired balance, Pain poorly managed, Pain    Plan    Continue POC    DME  PT DME Recommendations  Assistive Device: Front Wheeled Walker    Passport items to be completed:  Get in/out of bed safely, in/out of a vehicle, safely use mobility device, walk or wheel around home/community, navigate up and down stairs, show how to get up/down from the ground, ensure home is accessible, demonstrate HEP, complete caregiver training    Physical Therapy Problems (Active)       Problem: Mobility        Dates: Start:  10/26/23         Goal: STG-Within one week, patient will ambulate community distances x 150 feet with FWW and SBA       Dates: Start:  10/26/23            Goal: STG-Within one week, patient will ambulate up/down a curb with FWW and SBA       Dates: Start:  10/26/23               Problem: Mobility Transfers       Dates: Start:  10/26/23         Goal: STG-Within one week, patient will transfer bed to chair with FWW and SBA       Dates: Start:  10/26/23               Problem: PT-Long Term Goals       Dates: Start:  10/26/23         Goal: LTG-By discharge, patient will ambulate x 150 feet with FWW, mod I        Dates: Start:  10/26/23            Goal: LTG-By discharge, patient will transfer one surface to another with FWW, mod I        Dates: Start:  10/26/23            Goal: LTG-By discharge, patient will ambulate up/down 4-6 stairs with BHR and SPV       Dates: Start:  10/26/23            Goal: LTG-By discharge, patient will transfer in/out of a car with FWW, mod I        Dates: Start:  10/26/23

## 2023-10-29 NOTE — PROGRESS NOTES
Hypoglycemia Intervention    Hypoglycemia protocol intervention:  Blood glucose 45 at 2117.  Intervention: Gave patient snack as requested   Repeat blood glucose 103 at 0240.  Intervention: Provided snack as requested

## 2023-10-29 NOTE — PROGRESS NOTES
NURSING DAILY NOTE    Name: Hieu Duong   Date of Admission: 10/25/2023   Admitting Diagnosis: Closed right hip fracture, initial encounter ([facility])  Attending Physician: Montserrat Farnsworth D.o.  Allergies: Tdap [dipth, acell pertus, tetanus]    Safety  Patient Assist     Patient Precautions  Fall Risk, Weight Bearing As Tolerated Right Lower Extremity, Posterior Hip Precautions  Precaution Comments     Bed Transfer Status  Supervised  Toilet Transfer Status   Standby Assist  Assistive Devices  Rails, Wheelchair, Walker - front wheel  Oxygen  None - Room Air  Diet/Therapeutic Dining  Current Diet Order   Procedures    Diet Order Diet: Consistent CHO (Diabetic)     Pill Administration  whole  Agitated Behavioral Scale     ABS Level of Severity       Fall Risk  Has the patient had a fall this admission?   No  Nancy Tracey Fall Risk Scoring  16, HIGH RISK  Fall Risk Safety Measures  bed alarm and chair alarm    Vitals  Temperature: 36.4 °C (97.5 °F)  Temp src: Oral  Pulse: 81  Respiration: 18  Blood Pressure : 111/56  Blood Pressure MAP (Calculated): 74 MM HG  BP Location: Right, Upper Arm  Patient BP Position: Sitting     Oxygen  Pulse Oximetry: 96 %  O2 (LPM): 0  O2 Delivery Device: None - Room Air    Bowel and Bladder  Last Bowel Movement  10/28/23  Stool Type  Type 7: Watery, no solid pieces-entirely liquid  Bowel Device     Continent  Bladder: Did not void   Bowel: No movement  Bladder Function  Urine Void (mL): 100 ml  Number of Times Voided: 1  Urine Color: Unable To Evaluate  Number of Times Incontinent of Urine: 0  Genitourinary Assessment   Bladder Assessment (WDL):  Within Defined Limits  Hagan Catheter: Not Applicable  Urine Color: Unable To Evaluate  Number of Bladder Accidents: 0  Total Number of Bladder of Accidents in Last 7 Days: 0  Number of Times Incontinent of Urine: 0  Bladder Device: Urinal  Time Void: Yes  Bladder Scan: Post Void  $  Bladder Scan Results (mL): 285    Skin  West Score   19  Sensory Interventions   Bed Types: Standard/Trauma Mattress  Skin Preventative Measures: Pillows in Use for Support / Positioning  Moisture Interventions         Pain  Pain Rating Scale  3 - Sometimes distracts me  Pain Location  Hip  Pain Location Orientation  Right  Pain Interventions   Medication (see MAR)    ADLs    Bathing      Linen Change      Personal Hygiene  Change Cher Pads, Moist Cher Wipes, Perineal Care  Chlorhexidine Bath      Oral Care  Brushed Teeth  Teeth/Dentures     Shave     Nutrition Percentage Eaten  *  * Meal *  *, Lunch, Between 50-75% Consumed  Environmental Precautions  Treaded Slipper Socks on Patient, Personal Belongings, Wastebasket, Call Bell etc. in Easy Reach, Transferred to Stronger Side, Report Given to Other Health Care Providers Regarding Fall Risk, Bed in Low Position, Communication Sign for Patients & Families, Mobility Assessed & Appropriate Sign Placed  Patient Turns/Positioning  Patient Turns Self from Side to Side  Patient Turns Assistance/Tolerance     Bed Positions  Bed Controls On, Bed Locked  Head of Bed Elevated  Self regulated      Psychosocial/Neurologic Assessment  Psychosocial Assessment  Psychosocial (WDL):  Within Defined Limits  Neurologic Assessment  Neuro (WDL): Exceptions to WDL  Level of Consciousness: Alert  Orientation Level: Oriented X4  Cognition: Appropriate judgement, Follows commands  Motor Function/Sensation Assessment: Sensation  RUE Sensation: Full sensation  LUE Sensation: Full sensation  RLE Sensation:  (foot)  LLE Sensation: Full sensation  EENT (WDL):  WDL Except    Cardio/Pulmonary Assessment  Edema      Respiratory Breath Sounds     Cardiac Assessment   Cardiac (WDL):  Within Defined Limits

## 2023-10-29 NOTE — PROGRESS NOTES
Hospital Medicine Daily Progress Note        Chief Complaint  Diabetes Mellitus    Interval Problem Update  Pt wants to be given Lantus one hour before breakfast.  Low morning sugars better off qhs SSI.    Review of Systems  Review of Systems   Constitutional:  Negative for chills and fever.   HENT: Negative.     Eyes: Negative.    Respiratory:  Negative for cough and shortness of breath.    Cardiovascular:  Negative for chest pain and palpitations.   Gastrointestinal:  Negative for abdominal pain, nausea and vomiting.   Musculoskeletal:  Positive for joint pain.   Skin:  Negative for itching and rash.   Endo/Heme/Allergies:  Negative for polydipsia. Does not bruise/bleed easily.        Physical Exam  Temp:  [36.5 °C (97.7 °F)-36.6 °C (97.9 °F)] 36.5 °C (97.7 °F)  Pulse:  [79-85] 79  Resp:  [18] 18  BP: (111-137)/(56-68) 137/67  SpO2:  [95 %-97 %] 97 %    Physical Exam  Vitals reviewed.   Constitutional:       General: He is not in acute distress.     Appearance: Normal appearance. He is not ill-appearing.   HENT:      Head: Normocephalic and atraumatic.      Right Ear: External ear normal.      Left Ear: External ear normal.      Nose: Nose normal.      Mouth/Throat:      Pharynx: Oropharynx is clear.   Eyes:      General:         Right eye: No discharge.         Left eye: No discharge.      Extraocular Movements: Extraocular movements intact.      Conjunctiva/sclera: Conjunctivae normal.   Cardiovascular:      Rate and Rhythm: Normal rate and regular rhythm.   Pulmonary:      Effort: Pulmonary effort is normal. No respiratory distress.      Breath sounds: Normal breath sounds. No wheezing.   Abdominal:      General: Bowel sounds are normal. There is no distension.      Palpations: Abdomen is soft.      Tenderness: There is no abdominal tenderness.   Musculoskeletal:      Cervical back: Normal range of motion and neck supple.      Right lower leg: Edema present.      Left lower leg: No edema.   Skin:     General:  Skin is warm and dry.   Neurological:      Mental Status: He is alert and oriented to person, place, and time.         Fluids    Intake/Output Summary (Last 24 hours) at 10/29/2023 1430  Last data filed at 10/29/2023 0900  Gross per 24 hour   Intake 240 ml   Output 650 ml   Net -410 ml       Laboratory              Recent Labs     10/27/23  0558   INR 2.84*               Assessment/Plan  * Closed right hip fracture, initial encounter ([facility])- (present on admission)  Assessment & Plan  2/2 fall  S/P R hip hemiarthroplasty on 10/21/23 at [facility]  Wound care and pain control per Physiatry    Depression  Assessment & Plan  On Lexapro    Anemia  Assessment & Plan  Had post-op anemia  S/P PRBC at [facility]  Fe 23, continue supplement  Follow H/H  Check F/U labs in AM    Azotemia  Assessment & Plan  Renal function resolved w/ PO fluids    Type 1 diabetes mellitus without complication ([facility])- (present on admission)  Assessment & Plan  HbA1c 6.3  Continue Lantus qam, to be given 1 hr prior to breakfast per pt request  Also on SSI qac only, no qhs dose  Outpt meds include Tresiba 14 u qam and SSI    History of CVA (cerebrovascular accident)- (present on admission)  Assessment & Plan  On Coumadin and Lipitor       Full Code    Discussed w/ pt and RN (Feli)

## 2023-10-29 NOTE — PROGRESS NOTES
NURSING DAILY NOTE    Name: Hieu Duong   Date of Admission: 10/25/2023   Admitting Diagnosis: Closed right hip fracture, initial encounter ([facility])  Attending Physician: Montserrat Farnsworth D.o.  Allergies: Tdap [dipth, acell pertus, tetanus]    Safety  Patient Assist     Patient Precautions  Fall Risk, Weight Bearing As Tolerated Right Lower Extremity, Posterior Hip Precautions  Precaution Comments     Bed Transfer Status  Supervised  Toilet Transfer Status   Standby Assist  Assistive Devices  Rails, Wheelchair, Walker - front wheel  Oxygen  None - Room Air  Diet/Therapeutic Dining  Current Diet Order   Procedures    Diet Order Diet: Consistent CHO (Diabetic)     Pill Administration  whole  Agitated Behavioral Scale     ABS Level of Severity       Fall Risk  Has the patient had a fall this admission?   No  Nancy Tracey Fall Risk Scoring  16, HIGH RISK  Fall Risk Safety Measures  bed alarm, poor balance, and low vision/ hearing    Vitals  Temperature: 36.6 °C (97.9 °F)  Temp src: Oral  Pulse: 85  Respiration: 18  Blood Pressure : 135/68  Blood Pressure MAP (Calculated): 90 MM HG  BP Location: Right, Upper Arm  Patient BP Position: Supine     Oxygen  Pulse Oximetry: 95 %  O2 (LPM): 0  O2 Delivery Device: None - Room Air    Bowel and Bladder  Last Bowel Movement  10/28/23  Stool Type  Type 7: Watery, no solid pieces-entirely liquid  Bowel Device     Continent  Bladder: Did not void   Bowel: No movement  Bladder Function  Urine Void (mL): 300 ml  Number of Times Voided: 1  Urine Color: Yellow  Number of Times Incontinent of Urine: 0  Genitourinary Assessment   Bladder Assessment (WDL):  Within Defined Limits  Hagan Catheter: Not Applicable  Urine Color: Yellow  Number of Bladder Accidents: 0  Total Number of Bladder of Accidents in Last 7 Days: 0  Number of Times Incontinent of Urine: 0  Bladder Device: Urinal  Time Void: Yes  Bladder Scan: Post Void  $  Bladder Scan Results (mL): 285    Skin  West Score   19  Sensory Interventions   Bed Types: Standard/Trauma Mattress  Skin Preventative Measures: Pillows in Use for Support / Positioning  Moisture Interventions         Pain  Pain Rating Scale  7 - Focus of attention, prevents doing daily activities  Pain Location  Hip  Pain Location Orientation  Right  Pain Interventions   Medication (see MAR)    ADLs    Bathing      Linen Change      Personal Hygiene  Change Cher Pads, Moist Cher Wipes, Perineal Care  Chlorhexidine Bath      Oral Care  Brushed Teeth  Teeth/Dentures     Shave     Nutrition Percentage Eaten  *  * Meal *  *, Lunch, Between 50-75% Consumed  Environmental Precautions  Treaded Slipper Socks on Patient, Personal Belongings, Wastebasket, Call Bell etc. in Easy Reach, Report Given to Other Health Care Providers Regarding Fall Risk, Bed in Low Position, Communication Sign for Patients & Families  Patient Turns/Positioning  Patient Turns Self from Side to Side  Patient Turns Assistance/Tolerance     Bed Positions  Bed Controls On, Bed Locked  Head of Bed Elevated  Self regulated      Psychosocial/Neurologic Assessment  Psychosocial Assessment  Psychosocial (WDL):  Within Defined Limits  Neurologic Assessment  Neuro (WDL): Exceptions to WDL  Level of Consciousness: Alert  Orientation Level: Oriented X4  Cognition: Appropriate judgement, Follows commands  Speech: Clear  Motor Function/Sensation Assessment: Sensation  RUE Sensation: Full sensation  LUE Sensation: Full sensation  RLE Sensation: Tingling (Foot)  LLE Sensation: Full sensation  EENT (WDL):  WDL Except    Cardio/Pulmonary Assessment  Edema      Respiratory Breath Sounds     Cardiac Assessment   Cardiac (WDL):  Within Defined Limits

## 2023-10-29 NOTE — PROGRESS NOTES
Physical Medicine & Rehabilitation Progress Note  Encounter Date: 10/28/2023    Chief Complaint:   Hip pain    Interval Events (Subjective):  We discussed the patient's insomnia.  He would rather have insomnia than increase any risk of falling which she does attribute Ambien to likely contributing to this.    Objective:  VITAL SIGNS: VITAL SIGNS:   Vitals:    10/27/23 1926 10/28/23 0725 10/28/23 1031 10/28/23 1505   BP: 113/59 135/66 139/72 111/56   Pulse: 83 85 84 81   Resp: 18 18  18   Temp: 37 °C (98.6 °F) 36.4 °C (97.5 °F)     TempSrc: Temporal Oral     SpO2: 95% 96% 96% 96%   Weight:       Height:             Gen: NAD  Psych: Mood and affect appropriate  CV: RRR,  Resp: CTAB, no upper airway sounds  Abd: NTND  Neuro: Alert and oriented x3    Laboratory Values:  Recent Results (from the past 72 hour(s))   POCT glucose device results    Collection Time: 10/25/23  8:47 PM   Result Value Ref Range    POC Glucose, Blood 191 (H) 65 - 99 mg/dL   Comp Metabolic Panel (CMP)    Collection Time: 10/26/23  5:59 AM   Result Value Ref Range    Sodium 134 (L) 135 - 145 mmol/L    Potassium 4.5 3.6 - 5.5 mmol/L    Chloride 97 96 - 112 mmol/L    Co2 29 20 - 33 mmol/L    Anion Gap 8.0 7.0 - 16.0    Glucose 222 (H) 65 - 99 mg/dL    Bun 18 8 - 22 mg/dL    Creatinine 0.54 0.50 - 1.40 mg/dL    Calcium 8.2 (L) 8.5 - 10.5 mg/dL    Correct Calcium 9.2 8.5 - 10.5 mg/dL    AST(SGOT) 31 12 - 45 U/L    ALT(SGPT) 27 2 - 50 U/L    Alkaline Phosphatase 74 30 - 99 U/L    Total Bilirubin 1.6 (H) 0.1 - 1.5 mg/dL    Albumin 2.8 (L) 3.2 - 4.9 g/dL    Total Protein 5.7 (L) 6.0 - 8.2 g/dL    Globulin 2.9 1.9 - 3.5 g/dL    A-G Ratio 1.0 g/dL   CBC with Differential    Collection Time: 10/26/23  5:59 AM   Result Value Ref Range    WBC 6.4 4.8 - 10.8 K/uL    RBC 3.55 (L) 4.70 - 6.10 M/uL    Hemoglobin 11.2 (L) 14.0 - 18.0 g/dL    Hematocrit 33.2 (L) 42.0 - 52.0 %    MCV 93.5 81.4 - 97.8 fL    MCH 31.5 27.0 - 33.0 pg    MCHC 33.7 32.3 - 36.5 g/dL     RDW 45.6 35.9 - 50.0 fL    Platelet Count 278 164 - 446 K/uL    MPV 9.8 9.0 - 12.9 fL    Neutrophils-Polys 68.60 44.00 - 72.00 %    Lymphocytes 16.80 (L) 22.00 - 41.00 %    Monocytes 9.80 0.00 - 13.40 %    Eosinophils 3.90 0.00 - 6.90 %    Basophils 0.60 0.00 - 1.80 %    Immature Granulocytes 0.30 0.00 - 0.90 %    Nucleated RBC 0.00 0.00 - 0.20 /100 WBC    Neutrophils (Absolute) 4.40 1.82 - 7.42 K/uL    Lymphs (Absolute) 1.08 1.00 - 4.80 K/uL    Monos (Absolute) 0.63 0.00 - 0.85 K/uL    Eos (Absolute) 0.25 0.00 - 0.51 K/uL    Baso (Absolute) 0.04 0.00 - 0.12 K/uL    Immature Granulocytes (abs) 0.02 0.00 - 0.11 K/uL    NRBC (Absolute) 0.00 K/uL   Magnesium    Collection Time: 10/26/23  5:59 AM   Result Value Ref Range    Magnesium 1.7 1.5 - 2.5 mg/dL   Phosphorus    Collection Time: 10/26/23  5:59 AM   Result Value Ref Range    Phosphorus 2.9 2.5 - 4.5 mg/dL   TSH with Reflex to FT4    Collection Time: 10/26/23  5:59 AM   Result Value Ref Range    TSH 1.860 0.380 - 5.330 uIU/mL   HEMOGLOBIN A1C    Collection Time: 10/26/23  5:59 AM   Result Value Ref Range    Glycohemoglobin 6.3 (H) 4.0 - 5.6 %    Est Avg Glucose 134 mg/dL   IRON/TOTAL IRON BIND    Collection Time: 10/26/23  5:59 AM   Result Value Ref Range    Iron 23 (L) 50 - 180 ug/dL    Total Iron Binding 146 (L) 250 - 450 ug/dL    Unsat Iron Binding 123 110 - 370 ug/dL    % Saturation 16 15 - 55 %   ESTIMATED GFR    Collection Time: 10/26/23  5:59 AM   Result Value Ref Range    GFR (CKD-EPI) 108 >60 mL/min/1.73 m 2   POCT glucose device results    Collection Time: 10/26/23  8:21 AM   Result Value Ref Range    POC Glucose, Blood 257 (H) 65 - 99 mg/dL   POCT glucose device results    Collection Time: 10/26/23 11:34 AM   Result Value Ref Range    POC Glucose, Blood 265 (H) 65 - 99 mg/dL   URINALYSIS    Collection Time: 10/26/23 11:52 AM    Specimen: Urine, Clean Catch   Result Value Ref Range    Color Yellow     Character Clear     Specific Gravity 1.036 <1.035     Ph 6.5 5.0 - 8.0    Glucose >=1000 (A) Negative mg/dL    Ketones 15 (A) Negative mg/dL    Protein Negative Negative mg/dL    Bilirubin Negative Negative    Urobilinogen, Urine 1.0 Negative    Nitrite Negative Negative    Leukocyte Esterase Negative Negative    Occult Blood Negative Negative    Micro Urine Req see below    POCT glucose device results    Collection Time: 10/26/23  5:27 PM   Result Value Ref Range    POC Glucose, Blood 117 (H) 65 - 99 mg/dL   POCT glucose device results    Collection Time: 10/26/23  7:44 PM   Result Value Ref Range    POC Glucose, Blood 202 (H) 65 - 99 mg/dL   Prothrombin Time    Collection Time: 10/27/23  5:58 AM   Result Value Ref Range    PT 30.2 (H) 12.0 - 14.6 sec    INR 2.84 (H) 0.87 - 1.13   POCT glucose device results    Collection Time: 10/27/23  7:44 AM   Result Value Ref Range    POC Glucose, Blood 149 (H) 65 - 99 mg/dL   POCT glucose device results    Collection Time: 10/27/23 11:03 AM   Result Value Ref Range    POC Glucose, Blood 246 (H) 65 - 99 mg/dL   POCT glucose device results    Collection Time: 10/27/23  5:22 PM   Result Value Ref Range    POC Glucose, Blood 257 (H) 65 - 99 mg/dL   POCT glucose device results    Collection Time: 10/27/23  9:31 PM   Result Value Ref Range    POC Glucose, Blood 238 (H) 65 - 99 mg/dL   POCT glucose device results    Collection Time: 10/28/23  8:04 AM   Result Value Ref Range    POC Glucose, Blood 78 65 - 99 mg/dL   POCT glucose device results    Collection Time: 10/28/23 12:08 PM   Result Value Ref Range    POC Glucose, Blood 260 (H) 65 - 99 mg/dL   POCT glucose device results    Collection Time: 10/28/23  5:22 PM   Result Value Ref Range    POC Glucose, Blood 89 65 - 99 mg/dL       Medications:  Scheduled Medications   Medication Dose Frequency    [START ON 10/29/2023] insulin GLARGINE  18 Units DAILY    insulin regular  2-12 Units TID AC    [START ON 10/29/2023] warfarin  5 mg ONCE AT 1800    melatonin  6 mg QHS    oxyCODONE  immediate-release  5 mg 5X/DAY    ferrous gluconate  324 mg QDAY with Breakfast    ascorbic acid  500 mg QDAY with Breakfast    Pharmacy Consult Request  1 Each PHARMACY TO DOSE    escitalopram  10 mg QHS    atorvastatin  20 mg Q EVENING    omeprazole  40 mg DAILY    acetaminophen  500 mg 4X/DAY    MD Alert...Warfarin per Pharmacy   PHARMACY TO DOSE     PRN medications: insulin regular **AND** POC blood glucose manual result **AND** NOTIFY MD and PharmD **AND** Administer 20 grams of glucose (approximately 8 ounces of fruit juice) every 15 minutes PRN FSBG less than 70 mg/dL **AND** dextrose bolus, senna-docusate **AND** polyethylene glycol/lytes **AND** magnesium hydroxide **AND** bisacodyl, oxyCODONE immediate-release, Respiratory Therapy Consult, lidocaine, hydrALAZINE, acetaminophen, carboxymethylcellulose, benzocaine-menthol, mag hydrox-al hydrox-simeth, ondansetron **OR** ondansetron, sodium chloride, melatonin    Bowel:  Last Documented BM Date:Last BM: 10/28/23  Last Documented BM Description: Stool Description: Large;Brown    Bladder:  Last Documented Urine Void (mL): 100 ml  Last Documented Bladder Scan:    Last Documented Bladder Scan Results (mL):      Physical Therapy:  Bed Mobility    Transfers Supervised  Adaptive equipment;Initial preparation for task;Supervision for safety (SPT w/ FWW)   Mobility Standby Assist (to SPV)   Stairs    Barriers to Discharge Home:      Occupational Therapy:  Grooming Supervision;Seated   Bathing Minimal Assist   UB Dressing Stand by Assist   LB Dressing Contact Guard Assist   Toileting Standby Assist   Shower & Transfer Contact Guard Assist   Barriers to Discharge Home:    Diet:  Current Diet Order   Procedures    Diet Order Diet: Consistent CHO (Diabetic)     Medical Decision Making and Plan:  Right femur fracture status post surgery done by Dr. Hernandez 10/21/2023  Continue physical therapy and Occupational Therapy minimum 15 hours/week    Pain  Continue  acetaminophen, lidocaine patches, oxycodone as needed.    Type 1 diabetes  Appreciate hospitalist consultation    Insomnia  Questionable if Ambien contributed to fall.  Will continue to avoid Ambien    GI prophylaxis on senna docusate for constipation prophylaxis.  Continue PPI.    At risk for skin breakdown.  Monitor closely.    DVT prophylaxis on warfarin for history of stroke    Labs reviewed.  Vitals reviewed.    _____________________________________    Everton Pearce MD  [facility] - Physical Medicine & Rehabilitation     _____________________________________

## 2023-10-29 NOTE — CARE PLAN
The patient is Stable - Low risk of patient condition declining or worsening    Problem: Knowledge Deficit - Standard  Goal: Patient and family/care givers will demonstrate understanding of plan of care, disease process/condition, diagnostic tests and medications  Outcome: Progressing. Reviewed POC, all questions answered.        Problem: Pain - Standard  Goal: Alleviation of pain or a reduction in pain to the patient’s comfort goal  Outcome: Progressing. Medicated per MAR and repositioned for comfort.       Shift Goals  Clinical Goals: Safety  Patient Goals: Participate in therapy, pain managment

## 2023-10-29 NOTE — PROGRESS NOTES
At 0200, observed notes from staff on reports that patient had an episode of hypoglycemia. Charge nurse checked and examined patient, he is not in any distress or feeling weak. He has his own glucometer and own snack at bedside. His glucometer trends upward since 2100 from the time when he had the hypoglycemia. Patient verbalized that he had 153 after having snack. Staff rechecked his blood glucose and reads 103 at 0240. Patient had more snacks. Will relay to day RN and MD. Will continue to monitor.

## 2023-10-30 ENCOUNTER — APPOINTMENT (OUTPATIENT)
Dept: PHYSICAL THERAPY | Facility: REHABILITATION | Age: 68
DRG: 560 | End: 2023-10-30
Attending: PHYSICAL MEDICINE & REHABILITATION
Payer: MEDICARE

## 2023-10-30 ENCOUNTER — APPOINTMENT (OUTPATIENT)
Dept: OCCUPATIONAL THERAPY | Facility: REHABILITATION | Age: 68
DRG: 560 | End: 2023-10-30
Attending: PHYSICAL MEDICINE & REHABILITATION
Payer: MEDICARE

## 2023-10-30 PROBLEM — D75.839 THROMBOCYTOSIS: Status: ACTIVE | Noted: 2023-10-30

## 2023-10-30 LAB
ANION GAP SERPL CALC-SCNC: 6 MMOL/L (ref 7–16)
BASOPHILS # BLD AUTO: 0.8 % (ref 0–1.8)
BASOPHILS # BLD: 0.05 K/UL (ref 0–0.12)
BUN SERPL-MCNC: 21 MG/DL (ref 8–22)
CALCIUM SERPL-MCNC: 8.4 MG/DL (ref 8.5–10.5)
CHLORIDE SERPL-SCNC: 103 MMOL/L (ref 96–112)
CO2 SERPL-SCNC: 28 MMOL/L (ref 20–33)
CREAT SERPL-MCNC: 0.62 MG/DL (ref 0.5–1.4)
EOSINOPHIL # BLD AUTO: 0.2 K/UL (ref 0–0.51)
EOSINOPHIL NFR BLD: 3 % (ref 0–6.9)
ERYTHROCYTE [DISTWIDTH] IN BLOOD BY AUTOMATED COUNT: 46.7 FL (ref 35.9–50)
GFR SERPLBLD CREATININE-BSD FMLA CKD-EPI: 103 ML/MIN/1.73 M 2
GLUCOSE BLD STRIP.AUTO-MCNC: 142 MG/DL (ref 65–99)
GLUCOSE BLD STRIP.AUTO-MCNC: 181 MG/DL (ref 65–99)
GLUCOSE BLD STRIP.AUTO-MCNC: 184 MG/DL (ref 65–99)
GLUCOSE BLD STRIP.AUTO-MCNC: 76 MG/DL (ref 65–99)
GLUCOSE SERPL-MCNC: 151 MG/DL (ref 65–99)
HCT VFR BLD AUTO: 32.9 % (ref 42–52)
HGB BLD-MCNC: 11 G/DL (ref 14–18)
IMM GRANULOCYTES # BLD AUTO: 0.02 K/UL (ref 0–0.11)
IMM GRANULOCYTES NFR BLD AUTO: 0.3 % (ref 0–0.9)
INR PPP: 4.41 (ref 0.87–1.13)
INR PPP: 4.6 (ref 0.87–1.13)
LYMPHOCYTES # BLD AUTO: 1.18 K/UL (ref 1–4.8)
LYMPHOCYTES NFR BLD: 17.7 % (ref 22–41)
MCH RBC QN AUTO: 31.6 PG (ref 27–33)
MCHC RBC AUTO-ENTMCNC: 33.4 G/DL (ref 32.3–36.5)
MCV RBC AUTO: 94.5 FL (ref 81.4–97.8)
MONOCYTES # BLD AUTO: 0.64 K/UL (ref 0–0.85)
MONOCYTES NFR BLD AUTO: 9.6 % (ref 0–13.4)
NEUTROPHILS # BLD AUTO: 4.56 K/UL (ref 1.82–7.42)
NEUTROPHILS NFR BLD: 68.6 % (ref 44–72)
NRBC # BLD AUTO: 0 K/UL
NRBC BLD-RTO: 0 /100 WBC (ref 0–0.2)
PHOSPHATE SERPL-MCNC: 2.7 MG/DL (ref 2.5–4.5)
PLATELET # BLD AUTO: 458 K/UL (ref 164–446)
PMV BLD AUTO: 9.3 FL (ref 9–12.9)
POTASSIUM SERPL-SCNC: 4.1 MMOL/L (ref 3.6–5.5)
PROTHROMBIN TIME: 42.8 SEC (ref 12–14.6)
PROTHROMBIN TIME: 44.2 SEC (ref 12–14.6)
RBC # BLD AUTO: 3.48 M/UL (ref 4.7–6.1)
SODIUM SERPL-SCNC: 137 MMOL/L (ref 135–145)
WBC # BLD AUTO: 6.7 K/UL (ref 4.8–10.8)

## 2023-10-30 PROCEDURE — 85610 PROTHROMBIN TIME: CPT

## 2023-10-30 PROCEDURE — 700102 HCHG RX REV CODE 250 W/ 637 OVERRIDE(OP): Performed by: HOSPITALIST

## 2023-10-30 PROCEDURE — 36415 COLL VENOUS BLD VENIPUNCTURE: CPT

## 2023-10-30 PROCEDURE — 770010 HCHG ROOM/CARE - REHAB SEMI PRIVAT*

## 2023-10-30 PROCEDURE — 99232 SBSQ HOSP IP/OBS MODERATE 35: CPT | Performed by: HOSPITALIST

## 2023-10-30 PROCEDURE — 97110 THERAPEUTIC EXERCISES: CPT

## 2023-10-30 PROCEDURE — 85025 COMPLETE CBC W/AUTO DIFF WBC: CPT

## 2023-10-30 PROCEDURE — 97530 THERAPEUTIC ACTIVITIES: CPT

## 2023-10-30 PROCEDURE — 84100 ASSAY OF PHOSPHORUS: CPT

## 2023-10-30 PROCEDURE — 700102 HCHG RX REV CODE 250 W/ 637 OVERRIDE(OP): Performed by: PHYSICAL MEDICINE & REHABILITATION

## 2023-10-30 PROCEDURE — 82962 GLUCOSE BLOOD TEST: CPT | Mod: 91

## 2023-10-30 PROCEDURE — 99233 SBSQ HOSP IP/OBS HIGH 50: CPT | Performed by: PHYSICAL MEDICINE & REHABILITATION

## 2023-10-30 PROCEDURE — A9270 NON-COVERED ITEM OR SERVICE: HCPCS | Performed by: PHYSICAL MEDICINE & REHABILITATION

## 2023-10-30 PROCEDURE — A9270 NON-COVERED ITEM OR SERVICE: HCPCS | Performed by: HOSPITALIST

## 2023-10-30 PROCEDURE — 80048 BASIC METABOLIC PNL TOTAL CA: CPT

## 2023-10-30 PROCEDURE — 97116 GAIT TRAINING THERAPY: CPT

## 2023-10-30 PROCEDURE — 97535 SELF CARE MNGMENT TRAINING: CPT

## 2023-10-30 RX ORDER — OXYCODONE HYDROCHLORIDE 5 MG/1
5 TABLET ORAL EVERY 4 HOURS PRN
Qty: 42 TABLET | Refills: 0 | Status: SHIPPED | OUTPATIENT
Start: 2023-10-30 | End: 2023-11-06

## 2023-10-30 RX ORDER — OMEPRAZOLE 20 MG/1
20 CAPSULE, DELAYED RELEASE ORAL DAILY
Qty: 30 CAPSULE | Refills: 2 | Status: SHIPPED | OUTPATIENT
Start: 2023-10-30

## 2023-10-30 RX ORDER — ESCITALOPRAM OXALATE 10 MG/1
10 TABLET ORAL
Qty: 30 TABLET | Refills: 2 | Status: SHIPPED | OUTPATIENT
Start: 2023-10-30 | End: 2023-11-02 | Stop reason: DRUGHIGH

## 2023-10-30 RX ORDER — ATORVASTATIN CALCIUM 20 MG/1
20 TABLET, FILM COATED ORAL
Qty: 30 TABLET | Refills: 2 | Status: SHIPPED | OUTPATIENT
Start: 2023-10-30

## 2023-10-30 RX ORDER — LANOLIN ALCOHOL/MO/W.PET/CERES
6 CREAM (GRAM) TOPICAL
Qty: 60 TABLET | Refills: 2 | Status: SHIPPED | OUTPATIENT
Start: 2023-10-30

## 2023-10-30 RX ORDER — FERROUS GLUCONATE 324(38)MG
324 TABLET ORAL
Qty: 30 TABLET | Refills: 2 | Status: SHIPPED | OUTPATIENT
Start: 2023-10-31

## 2023-10-30 RX ADMIN — ACETAMINOPHEN 500 MG: 500 TABLET ORAL at 05:47

## 2023-10-30 RX ADMIN — ACETAMINOPHEN 500 MG: 500 TABLET ORAL at 10:59

## 2023-10-30 RX ADMIN — OXYCODONE HYDROCHLORIDE 5 MG: 5 TABLET ORAL at 17:58

## 2023-10-30 RX ADMIN — OXYCODONE HYDROCHLORIDE 5 MG: 5 TABLET ORAL at 10:58

## 2023-10-30 RX ADMIN — Medication 6 MG: at 20:38

## 2023-10-30 RX ADMIN — ACETAMINOPHEN 500 MG: 500 TABLET ORAL at 17:57

## 2023-10-30 RX ADMIN — OXYCODONE HYDROCHLORIDE 5 MG: 5 TABLET ORAL at 12:28

## 2023-10-30 RX ADMIN — OXYCODONE HYDROCHLORIDE 5 MG: 5 TABLET ORAL at 21:00

## 2023-10-30 RX ADMIN — SENNOSIDES AND DOCUSATE SODIUM 2 TABLET: 8.6; 5 TABLET ORAL at 08:10

## 2023-10-30 RX ADMIN — OMEPRAZOLE 40 MG: 20 CAPSULE, DELAYED RELEASE ORAL at 08:10

## 2023-10-30 RX ADMIN — ACETAMINOPHEN 500 MG: 500 TABLET ORAL at 14:03

## 2023-10-30 RX ADMIN — ATORVASTATIN CALCIUM 20 MG: 10 TABLET, FILM COATED ORAL at 20:38

## 2023-10-30 RX ADMIN — OXYCODONE HYDROCHLORIDE 5 MG: 5 TABLET ORAL at 05:47

## 2023-10-30 RX ADMIN — Medication 324 MG: at 08:10

## 2023-10-30 RX ADMIN — ESCITALOPRAM OXALATE 10 MG: 10 TABLET ORAL at 20:39

## 2023-10-30 RX ADMIN — OXYCODONE HYDROCHLORIDE 5 MG: 5 TABLET ORAL at 01:06

## 2023-10-30 RX ADMIN — OXYCODONE HYDROCHLORIDE AND ACETAMINOPHEN 500 MG: 500 TABLET ORAL at 08:10

## 2023-10-30 ASSESSMENT — ACTIVITIES OF DAILY LIVING (ADL): BED_CHAIR_WHEELCHAIR_TRANSFER_DESCRIPTION: ADAPTIVE EQUIPMENT;SET-UP OF EQUIPMENT

## 2023-10-30 ASSESSMENT — ENCOUNTER SYMPTOMS
ABDOMINAL PAIN: 0
VOMITING: 0
COUGH: 0
FEVER: 0
CHILLS: 0
BRUISES/BLEEDS EASILY: 0
EYES NEGATIVE: 1
POLYDIPSIA: 0
NAUSEA: 0
PALPITATIONS: 0
SHORTNESS OF BREATH: 0

## 2023-10-30 ASSESSMENT — GAIT ASSESSMENTS
ASSISTIVE DEVICE: FRONT WHEEL WALKER
DISTANCE (FEET): 250
DEVIATION: ANTALGIC
ASSISTIVE DEVICE: FRONT WHEEL WALKER
DISTANCE (FEET): 250
DISTANCE (FEET): 175
GAIT LEVEL OF ASSIST: MODIFIED INDEPENDENT
GAIT LEVEL OF ASSIST: SUPERVISED
GAIT LEVEL OF ASSIST: MODIFIED INDEPENDENT
ASSISTIVE DEVICE: FRONT WHEEL WALKER
DEVIATION: ANTALGIC
DEVIATION: ANTALGIC

## 2023-10-30 ASSESSMENT — PATIENT HEALTH QUESTIONNAIRE - PHQ9
SUM OF ALL RESPONSES TO PHQ9 QUESTIONS 1 AND 2: 0
1. LITTLE INTEREST OR PLEASURE IN DOING THINGS: NOT AT ALL
2. FEELING DOWN, DEPRESSED, IRRITABLE, OR HOPELESS: NOT AT ALL

## 2023-10-30 ASSESSMENT — PAIN DESCRIPTION - PAIN TYPE: TYPE: ACUTE PAIN

## 2023-10-30 NOTE — DISCHARGE PLANNING
Case management  Reviewed signed copy of IMM and answered all questions.    Dc date /disposition: home with home health on 10/31/23.

## 2023-10-30 NOTE — PROGRESS NOTES
Pharmacy Warfarin Consult   10/30/2023     68 y.o.   male     Indication for anticoagulation: Stroke, Chronic anticoagulation     Goal INR = 2 - 3    Recent Labs     10/30/23  0544 10/30/23  0910   INR 4.60* 4.41*   HEMOGLOBIN 11.0*  --    HEMATOCRIT 32.9*  --        Pertinent Drug/Drug Interactions:  PPI  Outpatient Warfarin Regimen:  2.5 mg (5 mg x 0.5) every Tue, Thu, Sat; 5 mg (5 mg x 1) all other days  Recent Warfarin Dosing:    Dose from last 7 days       Date/Time Dose (mg)    10/30/23 1038 0    10/29/23 1050 5    10/29/23 0800 5    10/28/23 1050 2.5    10/27/23 0844 5    10/26/23 1138 2.5    10/25/23 1338 5            Bridge Therapy: no           1.  Hold coumadin tonight per INR = 4.60, rpeeat INR  = 4.41  2.  Add INR on 10/31/2023        Reg Barron [facility]

## 2023-10-30 NOTE — PROGRESS NOTES
Hospital Medicine Daily Progress Note        Chief Complaint  Diabetes Mellitus    Interval Problem Update  Pt wants to manage his diabetes on his own.  No other complaints.  Labs reviewed.    Review of Systems  Review of Systems   Constitutional:  Negative for chills and fever.   HENT: Negative.     Eyes: Negative.    Respiratory:  Negative for cough and shortness of breath.    Cardiovascular:  Negative for chest pain and palpitations.   Gastrointestinal:  Negative for abdominal pain, nausea and vomiting.   Musculoskeletal:  Positive for joint pain.   Skin:  Negative for itching and rash.   Endo/Heme/Allergies:  Negative for polydipsia. Does not bruise/bleed easily.        Physical Exam  Temp:  [36.6 °C (97.8 °F)-37 °C (98.6 °F)] 36.6 °C (97.8 °F)  Pulse:  [78-86] 78  Resp:  [16-18] 18  BP: ()/(56-75) 146/75  SpO2:  [96 %] 96 %    Physical Exam  Vitals reviewed.   Constitutional:       General: He is not in acute distress.     Appearance: Normal appearance. He is not ill-appearing.   HENT:      Head: Normocephalic and atraumatic.      Right Ear: External ear normal.      Left Ear: External ear normal.      Nose: Nose normal.      Mouth/Throat:      Pharynx: Oropharynx is clear.   Eyes:      General:         Right eye: No discharge.         Left eye: No discharge.      Extraocular Movements: Extraocular movements intact.      Conjunctiva/sclera: Conjunctivae normal.   Cardiovascular:      Rate and Rhythm: Normal rate and regular rhythm.   Pulmonary:      Effort: Pulmonary effort is normal. No respiratory distress.      Breath sounds: Normal breath sounds. No wheezing.   Abdominal:      General: Bowel sounds are normal. There is no distension.      Palpations: Abdomen is soft.      Tenderness: There is no abdominal tenderness.   Musculoskeletal:      Cervical back: Normal range of motion and neck supple.      Right lower leg: Edema present.      Left lower leg: No edema.   Skin:     General: Skin is warm and  dry.   Neurological:      Mental Status: He is alert and oriented to person, place, and time.         Fluids    Intake/Output Summary (Last 24 hours) at 10/30/2023 0901  Last data filed at 10/30/2023 0825  Gross per 24 hour   Intake 660 ml   Output 750 ml   Net -90 ml       Laboratory  Recent Labs     10/30/23  0544   WBC 6.7   RBC 3.48*   HEMOGLOBIN 11.0*   HEMATOCRIT 32.9*   MCV 94.5   MCH 31.6   MCHC 33.4   RDW 46.7   PLATELETCT 458*   MPV 9.3       Recent Labs     10/30/23  0544   SODIUM 137   POTASSIUM 4.1   CHLORIDE 103   CO2 28   GLUCOSE 151*   BUN 21   CREATININE 0.62   CALCIUM 8.4*       Recent Labs     10/30/23  0544   INR 4.60*               Assessment/Plan  * Closed right hip fracture, initial encounter (HCC)- (present on admission)  Assessment & Plan  2/2 fall  S/P R hip hemiarthroplasty on 10/21/23 at [facility]  Wound care and pain control per Physiatry    Thrombocytosis  Assessment & Plan  Likely reactive  Follow PLT    Depression  Assessment & Plan  On Lexapro    Anemia  Assessment & Plan  Had post-op anemia  S/P PRBC at [facility]  Fe 23, continue supplement  H/H stable    Azotemia  Assessment & Plan  Renal function resolved w/ PO fluids    Type 1 diabetes mellitus without complication (HCC)- (present on admission)  Assessment & Plan  HbA1c 6.3  Continue Lantus qam, to be given 1 hr prior to breakfast per pt request  Also on SSI qac only, no qhs dose  Outpt meds include Tresiba 14 u qam and SSI    History of CVA (cerebrovascular accident)- (present on admission)  Assessment & Plan  On Coumadin and Lipitor       Full Code    Pt prefers to manage Type I DM on his own.  He has no other acute medical issues requiring Hospitalist services at this time.  Will sign off.  Please re-consult as needed.  Discussed w/ Dr. Farnsworth.

## 2023-10-30 NOTE — DISCHARGE SUMMARY
Physical Medicine & Rehabilitation Discharge Summary    Admission Date: 10/25/2023    Discharge Date: 10/31/2023     Attending Provider: Montserrat Farnsworth DO, MS    Admission Diagnosis:   Active Hospital Problems    Diagnosis     *Closed right hip fracture, initial encounter ([facility])     Thrombocytosis     Azotemia     Anemia     Depression     Hypophosphatemia     Type 1 diabetes mellitus without complication (HCC)     Benign prostatic hyperplasia without lower urinary tract symptoms     Essential hypertension     Mixed hyperlipidemia     Other insomnia     Chronic anticoagulation     History of CVA (cerebrovascular accident)     History of GI bleed     Vision loss        Discharge Diagnosis:  Active Hospital Problems    Diagnosis     *Closed right hip fracture, initial encounter ([facility])     Thrombocytosis     Azotemia     Anemia     Depression     Hypophosphatemia     Type 1 diabetes mellitus without complication (HCC)     Benign prostatic hyperplasia without lower urinary tract symptoms     Essential hypertension     Mixed hyperlipidemia     Other insomnia     Chronic anticoagulation     History of CVA (cerebrovascular accident)     History of GI bleed     Vision loss        HPI per Admission History & Physical:  Hieu Duong is a 67 yo male with PMH significant for history of stroke on anticoagulation, GERD, hyperlipidemia, type 1 diabetes mellitus, insomnia, BPH, hypertension who presented to [facility] after sustaining a fall found to have right femoral neck fracture and is s/p right hip hemiarthroplasty 10/21/2023 Dr. Hernandez.  His postoperative course was complicated by acute blood loss anemia for which she received a transfusion, his hemoglobin improved into the tens.  It was also complicated by orthostatic hypotension and his enalapril was held.     Patient was admitted to the acute rehab unit 10/25/2023.  On admission he reports that he is happy to be here.  His wife is at home on  hospice.  Adult children who could be around if needed and he also has a good support system of friends.  His goal is to get as independent as possible so he can go home and spend time with his wife.    Patient was admitted to [facility] on 10/25/2023.     Hospital Course by Problem List:  Right femoral neck fracture s/p right hip hemiarthroplasty 10/21/2023 Dr. Hernandez  PT and OT for mobility and ADLs. Per guidelines, 15 hours per week between PT, OT and/or SLP.  Follow-up orthopedics  Stable/sutures out 14 days postoperatively  Weightbearing as tolerated right lower extremity     Pain  Scheduled Tylenol  As needed oxycodone and Tylenol  Lidocaine patches as needed  10/27 schedule oxycodone 5 mg 5 times daily, change as needed oxycodone to 2.5-5 mg every 2 hours.    I discussed the risks and benefits of using opiate medications for pain control.  I discussed the risk of addiction, potential for overdose, and respiratory depression (and the potential need for opiate antagonist therapy if this occurs).  I encouraged the patient to take this medication sparingly with the expressed goal of weaning off the medication as soon as is clinically appropriate.  I informed the patient that we are only able to provide a 7 day supply of these medications at discharge and that they will be responsible for requesting any refills needed from their primary care provider or their surgeon.  We discussed the need to safely secure these medications to prevent theft, inadvertent ingestion, or misuse.  Any unused medication should be immediately disposed of through a sanctioned medication disposal program.  We discussed adjunctive pain medications and conservative therapies at length.      I answered the patient's questions regarding this treatment, and the patient indicated understanding and willingness to proceed.       Acute blood loss anemia  Required transfusion 10/24  Improved to 11.2 10/26  Anemia stable in  the 11's 10/30     Hyponatremia  Mildly low 134  Monitor  Sodium normal at 1 3710/30     Hypophosphatemia  Mildly low, on supplement  Phosphorus normal at 2.710/30     H/o GI bleed on ASA and Plavix ~2014  Monitor H/H     Hypertension  Orthostatic hypotension  Enalapril on hold since Clif Garcia  Enalapril still on hold, blood pressure currently controlled     Type 1 diabetes mellitus with hyperglycemia  Restart insulin -at home he had been on Lyumjev KwikPen 4 units 3 times daily with meals and sliding scale + Tresiba 14 units once daily  Consult hospitalist -hospitalist no longer following due to patient preference  Hgb A1c 6.3  Consult pharmacy regarding starting patient's Tresiba     History of GERD  Continue PPI     History of stroke  Right eye vision loss   mild aphasia  Being treated as if cardioembolic though loop recorder has never shown arrhythmia  Continue warfarin  INR goal 2-3     Skin  Patient at risk for skin breakdown due to debility in areas including sacrum, achilles, elbows and head in addition to other sites. Nursing to assess skin daily.      Bowel   Patient on Senna-docusate as needed for constipation prophylaxis.      Bladder  History of BPH  Dysuria  Monitor PVRs and bladder scans  Timed voids  Had been on sildenafil  UA 10/26 - (-)  Start Macrobid after UA until Cx returns  10/27 culture negative, stop Macrobid     Insomnia  Had been on Ambien 10 mg  Discontinue as needed Ambien, start scheduled melatonin 6 mg.        Avoid polypharmacy -record review and history indicate that patient had taken Ambien at night before his fall     DVT PROPHYLAXIS: Warfarin (history of stroke)     HOSPITALIST FOLLOWING: NO - patient prefers to manage his diabetes himself.     CODE STATUS: FULL CODE d/w patient 10/25/2023      DISPO: 10/31/23. Home with wife who is on hospice.      M2B ELIGIBLE: TBD     DISCHARGE FOLLOW UP: Orthopedics (NEHA Hernandez)    Functional Status at Discharge  Eating:  Stand by  Assist  Eating Description:     Grooming:  Supervision, Standing (Distant supervision to brush teeth while standing @ sink)  Grooming Description:  Increased time, Initial preparation for task, Seated in wheelchair at sink, Set-up of equipment, Supervision for safety, Verbal cueing  Bathing:  Standby Assist (while incorporating sitting and standing, used LH sponge to facilitate independence w/ LB bathing tasks)  Bathing Description:  Grab bar, Hand held shower, Assit with back, Long handled bath tool, Increased time, Initial preparation for task, Set-up of equipment, Set up for shower sleeve, Supervision for safety  Upper Body Dressing:  Stand by Assist (Set-up to don/[facility] gown)  Upper Body Dressing Description:  Increased time, Initial preparation for task, Supervision for safety  Lower Body Dressing:  Standby Assist (set-up to don scrub bottoms, disposable briefs and non skid socks w/ AE (reacher, sock aid); incorporated sitting and standing w/ GB)  Lower Body Dressing Description:  Grab bar, Reacher, Sock aid, Increased time, Initial preparation for task, Set-up of equipment, Supervision for safety, Verbal cueing     Walk:  Modified Independent (vc for pathfinding only)  Distance Walked:  250  Number of Times Distance Was Traveled:  2  Assistive Device:  Front Wheel Walker  Gait Deviation:  Antalgic  Wheelchair:   (N/A as pt is ambulatory)  Distance Propelled:      Wheelchair Description:     Stairs Standby Assist  Stairs Description Extra time, Hand rails, Verbal cueing     Comprehension:     Comprehension Description:     Expression:     Expression Description:     Social Interaction:     Social Interaction Description:     Problem Solving:     Problem Solving Description:     Memory:     Memory Description:          IMontserrat D.O., personally performed a complete drug regimen review and no potential clinically significant medication issues were identified.   Discharge Medication:      Medication List        START taking these medications        Instructions   escitalopram 10 MG Tabs  Commonly known as: Lexapro   Take 1 Tablet by mouth at bedtime.  Dose: 10 mg     ferrous gluconate 324 (38 Fe) MG Tabs  Commonly known as: Fergon   Take 1 Tablet by mouth every morning with breakfast.  Dose: 324 mg     melatonin 3 MG Tabs   Take 2 Tablets by mouth at bedtime.  Dose: 6 mg     oxyCODONE immediate-release 5 MG Tabs  Commonly known as: Roxicodone   Take 1 Tablet by mouth every four hours as needed for Severe Pain for up to 7 days.  Dose: 5 mg            CONTINUE taking these medications        Instructions   atorvastatin 20 MG Tabs  Commonly known as: Lipitor   Take 1 Tablet by mouth every day.  Dose: 20 mg     Gvoke PFS 1 MG/0.2ML Sosy  Generic drug: Glucagon   Inject 1 Each under the skin.  Dose: 1 Each     insulin lispro 100 UNIT/ML  Commonly known as: HumaLOG   Inject  as instructed 3 times a day before meals.     omeprazole 20 MG delayed-release capsule  Commonly known as: PriLOSEC   Take 1 Capsule by mouth every day.  Dose: 20 mg     PREVIDENT 5000 BOOSTER DT   Apply  to teeth.     Tresiba 100 UNIT/ML Soln  Generic drug: Insulin Degludec   Inject 24 Units as instructed.  Dose: 24 Units     warfarin 5 MG Tabs  Commonly known as: Coumadin   Doctor's comments: This prescription is transmitted by a pharmacist under the authority of a collaborative practice agreement.  TAKE ONE-HALF TO ONE TABLET BY MOUTH DAILY OR AS DIRECTED BY ANTICOAGULATION CLINIC            STOP taking these medications      ciclopirox 8 % solution  Commonly known as: Penlac     enalapril 5 MG Tabs  Commonly known as: Vasotec              Discharge Diet:  Current Diet Order   Procedures    Diet Order Diet: Consistent CHO (Diabetic)       Discharge Activity:  Per PT/OT    Disposition:  Patient to discharge home with family support and community resources.    Equipment:  Per PT/OT    Follow-up & Discharge Instructions:  Follow up  with your primary care provider (PCP) within 7-10 days of discharge to review your medications and take over your care.     If you develop chest pain, fever, chills, change in neurologic function (weakness, sensation changes, vision changes), or other concerning sxs, seek immediate medical attention or call 911.      No future appointments.    Condition on Discharge:  Good    More than 35 minutes was spent on discharging this patient, including face-to-face time, prescription management, and the dictation of this note.    Dr. Montserrat Farnsworth DO, MS  Department of Physical Medicine & Rehabilitation    Date of Service: 10/31/2023

## 2023-10-30 NOTE — PROGRESS NOTES
NURSING DAILY NOTE    Name: Hieu Duong   Date of Admission: 10/25/2023   Admitting Diagnosis: Closed right hip fracture, initial encounter ([facility])  Attending Physician: Montserrat Farnsworth D.o.  Allergies: Tdap [dipth, acell pertus, tetanus]    Safety  Patient Assist     Patient Precautions  Fall Risk, Weight Bearing As Tolerated Right Lower Extremity, Posterior Hip Precautions  Precaution Comments     Bed Transfer Status  Supervised  Toilet Transfer Status   Standby Assist  Assistive Devices  Rails, Walker - front wheel  Oxygen  None - Room Air  Diet/Therapeutic Dining  Current Diet Order   Procedures    Diet Order Diet: Consistent CHO (Diabetic)     Pill Administration  whole  Agitated Behavioral Scale     ABS Level of Severity       Fall Risk  Has the patient had a fall this admission?   No  Nancy Tracey Fall Risk Scoring  16, HIGH RISK  Fall Risk Safety Measures  bed alarm and chair alarm    Vitals  Temperature: 36.9 °C (98.4 °F)  Temp src: Oral  Pulse: 86  Respiration: 18  Blood Pressure : 97/56  Blood Pressure MAP (Calculated): 70 MM HG  BP Location: Right, Upper Arm  Patient BP Position: Supine     Oxygen  Pulse Oximetry: 96 %  O2 (LPM): 0  O2 Delivery Device: None - Room Air    Bowel and Bladder  Last Bowel Movement  10/28/23  Stool Type  Type 7: Watery, no solid pieces-entirely liquid  Bowel Device     Continent  Bladder: Did not void   Bowel: No movement  Bladder Function  Urine Void (mL): 200 ml  Number of Times Voided: 1  Urine Color: Yellow  Number of Times Incontinent of Urine: 0  Genitourinary Assessment   Bladder Assessment (WDL):  Within Defined Limits  Hagan Catheter: Not Applicable  Urine Color: Yellow  Number of Bladder Accidents: 0  Total Number of Bladder of Accidents in Last 7 Days: 0  Number of Times Incontinent of Urine: 0  Bladder Device: Urinal  Time Void: Yes  Bladder Scan: Post Void  $ Bladder Scan Results (mL):  162    Skin  West Score   19  Sensory Interventions   Bed Types: Standard/Trauma Mattress  Skin Preventative Measures: Pillows in Use for Support / Positioning  Moisture Interventions         Pain  Pain Rating Scale  7 - Focus of attention, prevents doing daily activities  Pain Location  Hip  Pain Location Orientation  Right  Pain Interventions   Medication (see MAR), Repositioned, Rest    ADLs    Bathing      Linen Change      Personal Hygiene  Change Cher Pads, Moist Cher Wipes, Perineal Care  Chlorhexidine Bath      Oral Care  Brushed Teeth  Teeth/Dentures     Shave     Nutrition Percentage Eaten  *  * Meal *  *, Lunch, Between % Consumed  Environmental Precautions  Treaded Slipper Socks on Patient, Personal Belongings, Wastebasket, Call Bell etc. in Easy Reach, Report Given to Other Health Care Providers Regarding Fall Risk, Bed in Low Position, Communication Sign for Patients & Families  Patient Turns/Positioning  Patient Turns Self from Side to Side  Patient Turns Assistance/Tolerance     Bed Positions  Bed Controls On, Bed Locked  Head of Bed Elevated  Self regulated      Psychosocial/Neurologic Assessment  Psychosocial Assessment  Psychosocial (WDL):  Within Defined Limits  Neurologic Assessment  Neuro (WDL): Exceptions to WDL  Level of Consciousness: Alert  Orientation Level: Oriented X4  Cognition: Appropriate judgement, Follows commands  Speech: Clear  Motor Function/Sensation Assessment: Sensation  RUE Sensation: Full sensation  LUE Sensation: Full sensation  RLE Sensation: Tingling (Foot)  LLE Sensation: Full sensation  EENT (WDL):  WDL Except    Cardio/Pulmonary Assessment  Edema      Respiratory Breath Sounds     Cardiac Assessment   Cardiac (WDL):  Within Defined Limits

## 2023-10-30 NOTE — PROGRESS NOTES
Physical Medicine & Rehabilitation Progress Note  Encounter Date: 10/29/2023    Chief Complaint:   Hip pain    Interval Events (Subjective):  Patient wishes to discuss using his own glucometer and doing his own injections for diabetes however his glucometer had different measurements from our glucose measurements.  We discussed that he would have this discussion with the inpatient pharmacist and with the primary team tomorrow.    The patient desires to go home soon as possible and may need to leave earlier as his wife has a terminal illness and he reports she is not currently doing well.    Objective:  VITAL SIGNS: VITAL SIGNS:   Vitals:    10/28/23 1505 10/28/23 1830 10/29/23 0645 10/29/23 1450   BP: 111/56 135/68 137/67 108/65   Pulse: 81 85 79 82   Resp: 18 18 18 16   Temp:  36.6 °C (97.9 °F) 36.5 °C (97.7 °F) 37 °C (98.6 °F)   TempSrc:  Oral Oral Oral   SpO2: 96% 95% 97%    Weight:   58 kg (127 lb 13.9 oz)    Height:             Gen: NAD  Psych: Mood and affect appropriate  CV: RRR,  Resp: CTAB, no upper airway sounds  Abd: NTND  Neuro: Alert and oriented x3    Laboratory Values:  Recent Results (from the past 72 hour(s))   POCT glucose device results    Collection Time: 10/26/23  7:44 PM   Result Value Ref Range    POC Glucose, Blood 202 (H) 65 - 99 mg/dL   Prothrombin Time    Collection Time: 10/27/23  5:58 AM   Result Value Ref Range    PT 30.2 (H) 12.0 - 14.6 sec    INR 2.84 (H) 0.87 - 1.13   POCT glucose device results    Collection Time: 10/27/23  7:44 AM   Result Value Ref Range    POC Glucose, Blood 149 (H) 65 - 99 mg/dL   POCT glucose device results    Collection Time: 10/27/23 11:03 AM   Result Value Ref Range    POC Glucose, Blood 246 (H) 65 - 99 mg/dL   POCT glucose device results    Collection Time: 10/27/23  5:22 PM   Result Value Ref Range    POC Glucose, Blood 257 (H) 65 - 99 mg/dL   POCT glucose device results    Collection Time: 10/27/23  9:31 PM   Result Value Ref Range    POC Glucose, Blood  238 (H) 65 - 99 mg/dL   POCT glucose device results    Collection Time: 10/28/23  8:04 AM   Result Value Ref Range    POC Glucose, Blood 78 65 - 99 mg/dL   POCT glucose device results    Collection Time: 10/28/23 12:08 PM   Result Value Ref Range    POC Glucose, Blood 260 (H) 65 - 99 mg/dL   POCT glucose device results    Collection Time: 10/28/23  5:22 PM   Result Value Ref Range    POC Glucose, Blood 89 65 - 99 mg/dL   POCT glucose device results    Collection Time: 10/28/23  9:17 PM   Result Value Ref Range    POC Glucose, Blood 45 (L) 65 - 99 mg/dL   POCT glucose device results    Collection Time: 10/29/23  2:41 AM   Result Value Ref Range    POC Glucose, Blood 103 (H) 65 - 99 mg/dL   POCT glucose device results    Collection Time: 10/29/23  8:01 AM   Result Value Ref Range    POC Glucose, Blood 140 (H) 65 - 99 mg/dL   POCT glucose device results    Collection Time: 10/29/23 11:17 AM   Result Value Ref Range    POC Glucose, Blood 168 (H) 65 - 99 mg/dL   POCT glucose device results    Collection Time: 10/29/23  5:24 PM   Result Value Ref Range    POC Glucose, Blood 114 (H) 65 - 99 mg/dL       Medications:  Scheduled Medications   Medication Dose Frequency    insulin GLARGINE  18 Units DAILY    insulin regular  2-12 Units TID AC    melatonin  6 mg QHS    oxyCODONE immediate-release  5 mg 5X/DAY    ferrous gluconate  324 mg QDAY with Breakfast    ascorbic acid  500 mg QDAY with Breakfast    Pharmacy Consult Request  1 Each PHARMACY TO DOSE    escitalopram  10 mg QHS    atorvastatin  20 mg Q EVENING    omeprazole  40 mg DAILY    acetaminophen  500 mg 4X/DAY    MD Alert...Warfarin per Pharmacy   PHARMACY TO DOSE     PRN medications: insulin regular **AND** POC blood glucose manual result **AND** NOTIFY MD and PharmD **AND** Administer 20 grams of glucose (approximately 8 ounces of fruit juice) every 15 minutes PRN FSBG less than 70 mg/dL **AND** dextrose bolus, senna-docusate **AND** polyethylene glycol/lytes **AND**  magnesium hydroxide **AND** bisacodyl, oxyCODONE immediate-release, Respiratory Therapy Consult, lidocaine, hydrALAZINE, acetaminophen, carboxymethylcellulose, benzocaine-menthol, mag hydrox-al hydrox-simeth, ondansetron **OR** ondansetron, sodium chloride, melatonin    Bowel:  Last Documented BM Date:   Last Documented BM Description:      Bladder:  Last Documented Urine Void (mL): 200 ml  Last Documented Bladder Scan:    Last Documented Bladder Scan Results (mL):      Physical Therapy:  Bed Mobility    Transfers        Mobility     Stairs    Barriers to Discharge Home:      Occupational Therapy:  Grooming     Bathing     UB Dressing     LB Dressing     Toileting     Shower & Transfer     Barriers to Discharge Home:    Diet:  Current Diet Order   Procedures    Diet Order Diet: Consistent CHO (Diabetic)     Medical Decision Making and Plan:   Right femur fracture status post surgery done by Dr. Hernandez 10/21/2023  Continue physical therapy and Occupational Therapy minimum 15 hours/week    Pain  Continue acetaminophen, lidocaine patches, oxycodone as needed.    Type 1 diabetes  Appreciate hospitalist consultation  Patient wishes to discuss using his own glucometer and doing his own injections for diabetes however his glucometer had different measurements from our glucose measurements.  We discussed that he would have this discussion with the inpatient pharmacist and with the primary team tomorrow.  Labs reviewed.  Vitals reviewed.  Continue insulin sliding scale for now.  Continue glargine.    Insomnia  Questionable if Ambien contributed to fall.  Will continue to avoid Ambien    GI prophylaxis on senna docusate for constipation prophylaxis.  Continue PPI.    At risk for skin breakdown.  Monitor closely.    DVT prophylaxis on warfarin for history of stroke    Labs reviewed.  Vitals reviewed.            _____________________________________    Everton Pearce MD  [facility] - Physical Medicine & Rehabilitation      _____________________________________

## 2023-10-30 NOTE — THERAPY
Physical Therapy   Daily Treatment     Patient Name: Hieu Duong  Age:  68 y.o., Sex:  male  Medical Record #: [id number]  Today's Date: 10/30/2023     Precautions  Precautions: Fall Risk, Weight Bearing As Tolerated Right Lower Extremity, Posterior Hip Precautions    Subjective    Pt resting in bed, willing to participate.      Objective       10/30/23 1031   PT Charge Group   PT Gait Training (Units) 1   PT Therapeutic Exercise (Units) 2   PT Therapeutic Activities (Units) 1   PT Total Time Spent   PT Individual Total Time Spent (Mins) 60   Gait Functional Level of Assist    Gait Level Of Assist Supervised   Assistive Device Front Wheel Walker   Distance (Feet) 175   # of Times Distance was Traveled 2   Deviation Antalgic   Stairs Functional Level of Assist   Level of Assist with Stairs Standby Assist   # of Stairs Climbed 8  (4 standard rise steps x 2 trials)   Stairs Description Extra time;Hand rails;Verbal cueing   Transfer Functional Level of Assist   Bed, Chair, Wheelchair Transfer Supervised  (approaching modified independent)   Bed Chair Wheelchair Transfer Description Adaptive equipment;Set-up of equipment   Supine Lower Body Exercise   Bridges Two Legged;2 sets of 10   Hip Abduction 2 sets of 10   Heel Slide 2 sets of 10   Gluteal Isometrics 2 sets of 10   Standing Lower Body Exercises   Hip Flexion 2 sets of 10   Hip Extension 2 sets of 10   Hip Abduction 2 sets of 10   Heel Rise 2 sets of 10   Mini Squat 2 sets of 10   Bed Mobility    Supine to Sit Supervised   Sit to Supine Supervised   Sit to Stand Supervised     Gait/ posture re-ed with // bars and mirror for visual feedback/ verbal cues for symmetry for walking forward/ backward and side stepping.     Assessment    Pt tolerated gait/ standing and mobility training well, reports greatest area of pain/ discomfort is with sitting 2* incisional pain and bruising throughout R hip.     Strengths: Able to follow instructions, Effective communication  skills, Independent prior level of function, Motivated for self care and independence, Pleasant and cooperative, Supportive family, Willingly participates in therapeutic activities  Barriers: Generalized weakness, Home accessibility, Impaired activity tolerance, Impaired balance, Pain poorly managed, Pain    Plan    Ambulation with FWW  Reinforce LE strengthening  Transfers for consistent sequencing  Continue stair training    DME  PT DME Recommendations  Assistive Device: Front Wheeled Walker    Passport items to be completed:  Get in/out of bed safely, in/out of a vehicle, safely use mobility device, walk or wheel around home/community, navigate up and down stairs, show how to get up/down from the ground, ensure home is accessible, demonstrate HEP, complete caregiver training    Physical Therapy Problems (Active)       Problem: Mobility       Dates: Start:  10/26/23         Goal: STG-Within one week, patient will ambulate community distances x 150 feet with FWW and SBA       Dates: Start:  10/26/23            Goal: STG-Within one week, patient will ambulate up/down a curb with FWW and SBA       Dates: Start:  10/26/23               Problem: Mobility Transfers       Dates: Start:  10/26/23         Goal: STG-Within one week, patient will transfer bed to chair with FWW and SBA       Dates: Start:  10/26/23               Problem: PT-Long Term Goals       Dates: Start:  10/26/23         Goal: LTG-By discharge, patient will ambulate x 150 feet with FWW, mod I        Dates: Start:  10/26/23            Goal: LTG-By discharge, patient will transfer one surface to another with FWW, mod I        Dates: Start:  10/26/23            Goal: LTG-By discharge, patient will ambulate up/down 4-6 stairs with BHR and SPV       Dates: Start:  10/26/23            Goal: LTG-By discharge, patient will transfer in/out of a car with FWW, mod I        Dates: Start:  10/26/23

## 2023-10-30 NOTE — DISCHARGE PLANNING
CM met with patient to update on IDT and DC date of 10/31/23.  Answered questions.  CM also spoke with patients daughter Stacie to update on DC.  CM will continue to monitor for DC needs.

## 2023-10-30 NOTE — THERAPY
Occupational Therapy  Daily Treatment     Patient Name: Hieu Duong  Age:  68 y.o., Sex:  male  Medical Record #: [id number]  Today's Date: 10/30/2023     Precautions  Precautions: (P) Fall Risk, Weight Bearing As Tolerated Right Lower Extremity, Posterior Hip Precautions    Subjective    Patient eager to take shower. Motivated to actively participate throughout session.      Objective     10/30/23 0901   Therapy Missed   Missed Therapy (Minutes) 15   Reason For Missed Therapy Medical - Patient on Hold from Therapy;Medical - Patient with Nursing  (lab draw)   OT Charge Group   OT Self Care / ADL (Units) 3   OT Total Time Spent   OT Individual Total Time Spent (Mins) 45   Precautions   Precautions Fall Risk;Weight Bearing As Tolerated Right Lower Extremity;Posterior Hip Precautions   Vitals   O2 Delivery Device None - Room Air   Sleep/Wake Cycle   Sleep & Rest Awake   Functional Level of Assist   Grooming Supervision;Standing  (Distant supervision to brush teeth while standing @ sink)   Bathing Standby Assist  (while incorporating sitting and standing, used LH sponge to facilitate independence w/ LB bathing tasks)   Upper Body Dressing Stand by Assist  (Set-up to don/[facility] gown)   Lower Body Dressing Standby Assist  (set-up to don scrub bottoms, disposable briefs and non skid socks w/ AE (reacher, sock aid); incorporated sitting and standing w/ GB)   Bed, Chair, Wheelchair Transfer Supervised  (FWW level)   Tub / Shower Transfers Standby Assist  (FWW level)   Interdisciplinary Plan of Care Collaboration   IDT Collaboration with  Nursing;Physician   Patient Position at End of Therapy In Bed;Call Light within Reach;Tray Table within Reach;Phone within Reach   Collaboration Comments Rec'd patient from RN (lab draw); MD approved partial hold     SBA-supervision for FWW mobility room <> shower, no overt LOB observed     Assessment    Patient making excellent functional gains toward DC goals. Supervision to  mod I for ADL routine while incorporating sitting and standing (w/ FWW or GB for UE support.) Good adherence to posterior hip precautions noted. Patient remains highly motivated/participatory.   Strengths: Willingly participates in therapeutic activities, Supportive family, Pleasant and cooperative, Motivated for self care and independence, Independent prior level of function, Good insight into deficits/needs, Alert and oriented, Adequate strength, Effective communication skills    Plan    This afternoon: review toilet DME    Standing endurance, balance, ADLs/IADLs FWW level     Home shower set-up: WIS w/ small threshold, shower chair/GBs    DME  OT DME Recommendations  Bathroom Equipment:  patient owns shower chair and grab bars, hip kit recommended (handout provided)    Passport items to be completed:  Perform bathroom transfers, complete dressing, complete feeding, get ready for the day, prepare a simple meal, participate in household tasks, adapt home for safety needs, demonstrate home exercise program, complete caregiver training     Occupational Therapy Goals (Active)       There are no active problems.

## 2023-10-30 NOTE — CARE PLAN
The patient is Stable - Low risk of patient condition declining or worsening    Problem: Skin Integrity  Goal: Skin integrity is maintained or improved  Outcome: Progressing   Note: Patient's skin is maintained and free from new or accidental injury this shift.  Will continue to monitor.         Problem: Diabetes Management  Goal: Patient's ability to maintain appropriate glucose levels will be maintained or improve  Outcome: Progressing   Note: Patient's bedtime blood sugar was 84, own glucometer machine showed 86. No insulin coverage per MAR. Snacks provided. Checked patient's glucometer machine 1 hr after, showed a result of 191. Will continue to monitor.

## 2023-10-30 NOTE — THERAPY
Physical Therapy   Daily Treatment     Patient Name: Hieu Duong  Age:  68 y.o., Sex:  male  Medical Record #: [id number]  Today's Date: 10/30/2023     Precautions  Precautions: Fall Risk, Weight Bearing As Tolerated Right Lower Extremity, Posterior Hip Precautions    Subjective    Pt excited to be going home tomorrow.      Objective       10/30/23 1501   PT Charge Group   PT Gait Training (Units) 1   PT Therapeutic Exercise (Units) 1   PT Total Time Spent   PT Individual Total Time Spent (Mins) 30   Gait Functional Level of Assist    Gait Level Of Assist Modified Independent   Assistive Device Front Wheel Walker   Distance (Feet) 250   # of Times Distance was Traveled 2   Deviation Antalgic   Supine Lower Body Exercise   Other Exercises big shuttle [id number] 6BB in staggered stance with RLE prioritized   Bed Mobility    Supine to Sit Independent   Sit to Supine Independent   Sit to Stand Modified Independent  (to FWW)   Interdisciplinary Plan of Care Collaboration   Patient Position at End of Therapy In Bed;Bed Alarm On;Call Light within Reach;Tray Table within Reach         Assessment    Pt tolerated session well, feels ready to go home.     Strengths: Able to follow instructions, Effective communication skills, Independent prior level of function, Motivated for self care and independence, Pleasant and cooperative, Supportive family, Willingly participates in therapeutic activities  Barriers: Generalized weakness, Home accessibility, Impaired activity tolerance, Impaired balance, Pain poorly managed, Pain    Plan    D/c home tomorrow 10/31    DME  PT DME Recommendations  Assistive Device: Front Wheeled Walker    Passport items to be completed:  Get in/out of bed safely, in/out of a vehicle, safely use mobility device, walk or wheel around home/community, navigate up and down stairs, show how to get up/down from the ground, ensure home is accessible, demonstrate HEP, complete caregiver training    Physical Therapy  Problems (Active)       Problem: PT-Long Term Goals       Dates: Start:  10/26/23         Goal: LTG-By discharge, patient will ambulate x 150 feet with FWW, mod I        Dates: Start:  10/26/23            Goal: LTG-By discharge, patient will transfer one surface to another with FWW, mod I        Dates: Start:  10/26/23            Goal: LTG-By discharge, patient will ambulate up/down 4-6 stairs with BHR and SPV       Dates: Start:  10/26/23            Goal: LTG-By discharge, patient will transfer in/out of a car with FWW, mod I        Dates: Start:  10/26/23

## 2023-10-30 NOTE — PROGRESS NOTES
"  Physical Medicine & Rehabilitation Progress Note    Encounter Date: 10/30/2023    Chief Complaint: Would like to manage his own diabetes    Interval Events (Subjective):  Vital signs stable, 1 episode of elevated blood pressure in the 140s  Voiding volitionally with low PVRs  Bowel movement 10/28    Patient seen and examined working out in the therapy gym.  States that he would like to use his own diabetes medication.  He has it with him today.  Otherwise pain is fairly controlled.      ROS: 14 point ROS negative unless otherwise specified in the HPI    Objective:  VITAL SIGNS: BP (!) 146/75   Pulse 78   Temp 36.6 °C (97.8 °F) (Oral)   Resp 18   Ht 1.702 m (5' 7\")   Wt 58 kg (127 lb 13.9 oz)   SpO2 96%   BMI 20.03 kg/m²     GEN: No apparent distress  HEENT: Head normocephalic, atraumatic.  Sclera nonicteric bilaterally, no ocular discharge appreciated bilaterally.  CV: Extremities warm and well-perfused, no peripheral edema appreciated bilaterally.  PULMONARY: Breathing nonlabored on room air, no respiratory accessory muscle use.  Not requiring supplemental oxygen.  SKIN: No appreciable skin breakdown on exposed areas of skin.  PSYCH: Mood and affect within normal limits.  NEURO: Awake alert.  Conversational.  Logical thought content.      Laboratory Values:  Recent Results (from the past 72 hour(s))   POCT glucose device results    Collection Time: 10/27/23  5:22 PM   Result Value Ref Range    POC Glucose, Blood 257 (H) 65 - 99 mg/dL   POCT glucose device results    Collection Time: 10/27/23  9:31 PM   Result Value Ref Range    POC Glucose, Blood 238 (H) 65 - 99 mg/dL   POCT glucose device results    Collection Time: 10/28/23  8:04 AM   Result Value Ref Range    POC Glucose, Blood 78 65 - 99 mg/dL   POCT glucose device results    Collection Time: 10/28/23 12:08 PM   Result Value Ref Range    POC Glucose, Blood 260 (H) 65 - 99 mg/dL   POCT glucose device results    Collection Time: 10/28/23  5:22 PM "   Result Value Ref Range    POC Glucose, Blood 89 65 - 99 mg/dL   POCT glucose device results    Collection Time: 10/28/23  9:17 PM   Result Value Ref Range    POC Glucose, Blood 45 (L) 65 - 99 mg/dL   POCT glucose device results    Collection Time: 10/29/23  2:41 AM   Result Value Ref Range    POC Glucose, Blood 103 (H) 65 - 99 mg/dL   POCT glucose device results    Collection Time: 10/29/23  8:01 AM   Result Value Ref Range    POC Glucose, Blood 140 (H) 65 - 99 mg/dL   POCT glucose device results    Collection Time: 10/29/23 11:17 AM   Result Value Ref Range    POC Glucose, Blood 168 (H) 65 - 99 mg/dL   POCT glucose device results    Collection Time: 10/29/23  5:24 PM   Result Value Ref Range    POC Glucose, Blood 114 (H) 65 - 99 mg/dL   POCT glucose device results    Collection Time: 10/29/23  8:09 PM   Result Value Ref Range    POC Glucose, Blood 84 65 - 99 mg/dL   Prothrombin Time    Collection Time: 10/30/23  5:44 AM   Result Value Ref Range    PT 44.2 (H) 12.0 - 14.6 sec    INR 4.60 (H) 0.87 - 1.13   CBC WITH DIFFERENTIAL    Collection Time: 10/30/23  5:44 AM   Result Value Ref Range    WBC 6.7 4.8 - 10.8 K/uL    RBC 3.48 (L) 4.70 - 6.10 M/uL    Hemoglobin 11.0 (L) 14.0 - 18.0 g/dL    Hematocrit 32.9 (L) 42.0 - 52.0 %    MCV 94.5 81.4 - 97.8 fL    MCH 31.6 27.0 - 33.0 pg    MCHC 33.4 32.3 - 36.5 g/dL    RDW 46.7 35.9 - 50.0 fL    Platelet Count 458 (H) 164 - 446 K/uL    MPV 9.3 9.0 - 12.9 fL    Neutrophils-Polys 68.60 44.00 - 72.00 %    Lymphocytes 17.70 (L) 22.00 - 41.00 %    Monocytes 9.60 0.00 - 13.40 %    Eosinophils 3.00 0.00 - 6.90 %    Basophils 0.80 0.00 - 1.80 %    Immature Granulocytes 0.30 0.00 - 0.90 %    Nucleated RBC 0.00 0.00 - 0.20 /100 WBC    Neutrophils (Absolute) 4.56 1.82 - 7.42 K/uL    Lymphs (Absolute) 1.18 1.00 - 4.80 K/uL    Monos (Absolute) 0.64 0.00 - 0.85 K/uL    Eos (Absolute) 0.20 0.00 - 0.51 K/uL    Baso (Absolute) 0.05 0.00 - 0.12 K/uL    Immature Granulocytes (abs) 0.02 0.00 -  0.11 K/uL    NRBC (Absolute) 0.00 K/uL   Basic Metabolic Panel    Collection Time: 10/30/23  5:44 AM   Result Value Ref Range    Sodium 137 135 - 145 mmol/L    Potassium 4.1 3.6 - 5.5 mmol/L    Chloride 103 96 - 112 mmol/L    Co2 28 20 - 33 mmol/L    Glucose 151 (H) 65 - 99 mg/dL    Bun 21 8 - 22 mg/dL    Creatinine 0.62 0.50 - 1.40 mg/dL    Calcium 8.4 (L) 8.5 - 10.5 mg/dL    Anion Gap 6.0 (L) 7.0 - 16.0   PHOSPHORUS    Collection Time: 10/30/23  5:44 AM   Result Value Ref Range    Phosphorus 2.7 2.5 - 4.5 mg/dL   ESTIMATED GFR    Collection Time: 10/30/23  5:44 AM   Result Value Ref Range    GFR (CKD-EPI) 103 >60 mL/min/1.73 m 2   POCT glucose device results    Collection Time: 10/30/23  7:11 AM   Result Value Ref Range    POC Glucose, Blood 142 (H) 65 - 99 mg/dL   Prothrombin Time    Collection Time: 10/30/23  9:10 AM   Result Value Ref Range    PT 42.8 (H) 12.0 - 14.6 sec    INR 4.41 (H) 0.87 - 1.13   POCT glucose device results    Collection Time: 10/30/23 11:04 AM   Result Value Ref Range    POC Glucose, Blood 184 (H) 65 - 99 mg/dL       Medications:  Scheduled Medications   Medication Dose Frequency    insulin regular  2-12 Units TID AC    melatonin  6 mg QHS    oxyCODONE immediate-release  5 mg 5X/DAY    ferrous gluconate  324 mg QDAY with Breakfast    ascorbic acid  500 mg QDAY with Breakfast    Pharmacy Consult Request  1 Each PHARMACY TO DOSE    escitalopram  10 mg QHS    atorvastatin  20 mg Q EVENING    omeprazole  40 mg DAILY    acetaminophen  500 mg 4X/DAY    MD Alert...Warfarin per Pharmacy   PHARMACY TO DOSE     PRN medications: insulin regular **AND** POC blood glucose manual result **AND** NOTIFY MD and PharmD **AND** Administer 20 grams of glucose (approximately 8 ounces of fruit juice) every 15 minutes PRN FSBG less than 70 mg/dL **AND** dextrose bolus, senna-docusate **AND** polyethylene glycol/lytes **AND** magnesium hydroxide **AND** bisacodyl, oxyCODONE immediate-release, Respiratory Therapy  Consult, lidocaine, hydrALAZINE, acetaminophen, carboxymethylcellulose, benzocaine-menthol, mag hydrox-al hydrox-simeth, ondansetron **OR** ondansetron, sodium chloride, melatonin    Diet:  Current Diet Order   Procedures    Diet Order Diet: Consistent CHO (Diabetic)       Medical Decision Making and Plan:  Right femoral neck fracture s/p right hip hemiarthroplasty 10/21/2023 Dr. Hernandez  PT and OT for mobility and ADLs. Per guidelines, 15 hours per week between PT, OT and/or SLP.  Follow-up orthopedics  Stable/sutures out 14 days postoperatively  Weightbearing as tolerated right lower extremity     Pain  Scheduled Tylenol  As needed oxycodone and Tylenol  Lidocaine patches as needed  10/27 schedule oxycodone 5 mg 5 times daily, change as needed oxycodone to 2.5-5 mg every 2 hours.     Acute blood loss anemia  Required transfusion 10/24  Improved to 11.2 10/26  Anemia stable in the 11's 10/30    Hyponatremia  Mildly low 134  Monitor  Sodium normal at 1 3710/30    Hypophosphatemia  Mildly low, on supplement  Phosphorus normal at 2.710/30     H/o GI bleed on ASA and Plavix ~2014  Monitor H/H     Hypertension  Orthostatic hypotension  Enalapril on hold since Clif Garcia  Enalapril still on hold, blood pressure currently controlled    Type 1 diabetes mellitus with hyperglycemia  Restart insulin -at home he had been on Lyumjev KwikPen 4 units 3 times daily with meals and sliding scale + Tresiba 14 units once daily  Consult hospitalist -hospitalist no longer following due to patient preference  Hgb A1c 6.3  Consult pharmacy regarding starting patient's Tresiba     History of GERD  Continue PPI     History of stroke  Right eye vision loss   mild aphasia  Being treated as if cardioembolic though loop recorder has never shown arrhythmia  Continue warfarin  INR goal 2-3     Skin  Patient at risk for skin breakdown due to debility in areas including sacrum, achilles, elbows and head in addition to other sites. Nursing to  assess skin daily.      Bowel   Patient on Senna-docusate as needed for constipation prophylaxis.      Bladder  History of BPH  Dysuria  Monitor PVRs and bladder scans  Timed voids  Had been on sildenafil  UA 10/26 - (-)  Start Macrobid after UA until Cx returns  10/27 culture negative, stop Macrobid     Insomnia  Had been on Ambien 10 mg  Discontinue as needed Ambien, start scheduled melatonin 6 mg.       Avoid polypharmacy -record review and history indicate that patient had taken Ambien at night before his fall     DVT PROPHYLAXIS: Warfarin (history of stroke)     HOSPITALIST FOLLOWING: NO - patient prefers to manage his diabetes himself.     CODE STATUS: FULL CODE d/w patient 10/25/2023      DISPO: 10/31/23. Home with wife who is on hospice.      M2B ELIGIBLE: TBD     DISCHARGE FOLLOW UP: Orthopedics ([facility])  ____________________________________    Dr. Montserrat Farnsworth DO, MS  [facility] - Physical Medicine & Rehabilitation   ____________________________________    _____________________________________  Interdisciplinary Team Conference   Most recent IDT on 10/30/2023    I, Dr. Montserrat Farnsworth DO, MS, was present and led the interdisciplinary team conference on 10/30/2023.  I led the IDT conference and agree with the IDT conference documentation and plan of care as noted below.     Nursing:  Diet Current Diet Order   Procedures    Diet Order Diet: Consistent CHO (Diabetic)       Eating ADL Stand by Assist      % of Last Meal  Oral Nutrition: Lunch, Between 25-50% Consumed   Sleep    Bowel Last BM: 10/28/23   Bladder      Physical Therapy:  Bed Mobility    Transfers Supervised (approaching modified independent)  Adaptive equipment, Set-up of equipment   Mobility Supervised   Stairs    Doing well. Has met all of his goals.     Occupational Therapy:  Grooming Supervision, Standing (Distant supervision to brush teeth while standing @ sink)   Bathing Standby Assist (while incorporating sitting and standing,  used LH sponge to facilitate independence w/ LB bathing tasks)   UB Dressing Stand by Assist (Set-up to don/[facility] gown)   LB Dressing Standby Assist (set-up to don scrub bottoms, disposable briefs and non skid socks w/ AE (reacher, sock aid); incorporated sitting and standing w/ GB)   Toileting Standby Assist   Shower & Transfer    Walking well. No loss of balance.     Respiratory Therapy:  O2 (LPM): 0  O2 Delivery Device: None - Room Air    Case Management:  Continues to work on disposition and DME needs.     Discharge Date/Disposition:  10/31/23  _____________________________________  Total time:  52 minutes. Time spent included pre-rounding review of vitals and tests, unit/floor time, face-to-face time with the patient including physical examination, care coordination, counseling of patient and/or family, ordering medications/procedures/tests, discussion with CM, PT, OT, SLP and/or other healthcare providers, and documentation in the electronic medical record.

## 2023-10-30 NOTE — THERAPY
Occupational Therapy  Daily Treatment     Patient Name: Hieu Duong  Age:  68 y.o., Sex:  male  Medical Record #: [id number]  Today's Date: 10/30/2023     Precautions  Precautions: (P) Fall Risk, Weight Bearing As Tolerated Right Lower Extremity, Posterior Hip Precautions    Subjective    Patient in bed upon arrival, agreeable to participate in OT.      Objective     10/30/23 1301   OT Charge Group   OT Self Care / ADL (Units) 2   OT Total Time Spent   OT Individual Total Time Spent (Mins) 30   Precautions   Precautions Fall Risk;Weight Bearing As Tolerated Right Lower Extremity;Posterior Hip Precautions   Vitals   O2 Delivery Device None - Room Air   Cognition    Level of Consciousness Alert   Sleep/Wake Cycle   Sleep & Rest Awake   Functional Level of Assist   Bed, Chair, Wheelchair Transfer Supervised  (FWW level)   Interdisciplinary Plan of Care Collaboration   Patient Position at End of Therapy In Bed;Call Light within Reach     SBA for outdoor FWW mobility > 500' over various terrain/elevations.     Supervision to mod I for indoor FWW mobility     Assessment    SBA for outdoor mobility @ FWW level w/ no overt LOB noted.   Strengths: Willingly participates in therapeutic activities, Supportive family, Pleasant and cooperative, Motivated for self care and independence, Independent prior level of function, Good insight into deficits/needs, Alert and oriented, Adequate strength, Effective communication skills    Plan    Anticipate DC home tomorrow, provide patient w/ toilet DME options     DME  OT DME Recommendations  Bathroom Equipment:  (shower chair and grab bars)    Passport items to be completed:  Perform bathroom transfers, complete dressing, complete feeding, get ready for the day, prepare a simple meal, participate in household tasks, adapt home for safety needs, demonstrate home exercise program, complete caregiver training     Occupational Therapy Goals (Active)       Problem: OT Long Term Goals        Dates: Start:  10/30/23         Goal: LTG-By discharge, patient will complete basic self care tasks w/ supervision for LB ADLs, Independent for UB ADLs       Dates: Start:  10/30/23            Goal: LTG-By discharge, patient will perform bathroom transfers w/supervision to mod I w/ LRAD        Dates: Start:  10/30/23            Goal: LTG-By discharge, patient will complete basic home management w/ supervision to mod I w/ LRAD        Dates: Start:  10/30/23

## 2023-10-30 NOTE — CARE PLAN
Problem: Mobility  Goal: STG-Within one week, patient will ambulate community distances x 150 feet with FWW and SBA  Outcome: Met  Goal: STG-Within one week, patient will ambulate up/down a curb with FWW and SBA  Outcome: Met     Problem: Mobility Transfers  Goal: STG-Within one week, patient will transfer bed to chair with FWW and SBA  Outcome: Met

## 2023-10-30 NOTE — THERAPY
Physical Therapy   Daily Treatment     Patient Name: Hieu Duong  Age:  68 y.o., Sex:  male  Medical Record #: [id number]  Today's Date: 10/30/2023     Precautions  Precautions: Fall Risk, Weight Bearing As Tolerated Right Lower Extremity, Posterior Hip Precautions    Subjective    Pt resting in bed. Willing to participate     Objective       10/30/23 1401   PT Charge Group   PT Gait Training (Units) 1   PT Therapeutic Activities (Units) 1   PT Total Time Spent   PT Individual Total Time Spent (Mins) 30   Gait Functional Level of Assist    Gait Level Of Assist Modified Independent  (vc for pathfinding only)   Assistive Device Front Wheel Walker   Distance (Feet) 250   # of Times Distance was Traveled 2   Deviation Antalgic   Transfer Functional Level of Assist   Bed, Chair, Wheelchair Transfer Modified Independent   Bed Mobility    Supine to Sit Modified Independent   Sit to Supine Modified Independent   Sit to Stand Modified Independent     Pt completed car transfer with set-up and SPV for sequencing.   Pt completed outdoor ambulation/ uneven terrains with FWW modified independent.      Assessment    Pt presents with good safety and mobility skills with FWW at modified independent level. Provided with supine and standing exercises.    Strengths: Able to follow instructions, Effective communication skills, Independent prior level of function, Motivated for self care and independence, Pleasant and cooperative, Supportive family, Willingly participates in therapeutic activities  Barriers: Generalized weakness, Home accessibility, Impaired activity tolerance, Impaired balance, Pain poorly managed, Pain    Plan    D/c with home health PT    DME  PT DME Recommendations  Assistive Device: Front Wheeled Walker    Physical Therapy Problems (Active)       Problem: PT-Long Term Goals       Dates: Start:  10/26/23         Goal: LTG-By discharge, patient will ambulate x 150 feet with FWW, mod I        Dates: Start:   10/26/23            Goal: LTG-By discharge, patient will transfer one surface to another with FWW, mod I        Dates: Start:  10/26/23            Goal: LTG-By discharge, patient will ambulate up/down 4-6 stairs with BHR and SPV       Dates: Start:  10/26/23            Goal: LTG-By discharge, patient will transfer in/out of a car with FWW, mod I        Dates: Start:  10/26/23

## 2023-10-30 NOTE — PROGRESS NOTES
Patient care assumed. Report received from [facility] ROC[facility]O Ford. Patient is alert and calm, resting in bed. Call light and bedside table within reach. Will continue to monitor.

## 2023-10-30 NOTE — PROGRESS NOTES
NURSING DAILY NOTE    Name: Hieu Duong   Date of Admission: 10/25/2023   Admitting Diagnosis: Closed right hip fracture, initial encounter ([facility])  Attending Physician: Montserrat Farnsworth D.o.  Allergies: Tdap [dipth, acell pertus, tetanus]    Safety  Patient Assist     Patient Precautions  Fall Risk, Weight Bearing As Tolerated Right Lower Extremity, Posterior Hip Precautions  Precaution Comments     Bed Transfer Status  Supervised  Toilet Transfer Status   Standby Assist  Assistive Devices  Rails, Walker - front wheel  Oxygen  None - Room Air  Diet/Therapeutic Dining  Current Diet Order   Procedures    Diet Order Diet: Consistent CHO (Diabetic)     Pill Administration  whole  Agitated Behavioral Scale     ABS Level of Severity       Fall Risk  Has the patient had a fall this admission?   No  Nancy Tracey Fall Risk Scoring  16, HIGH RISK  Fall Risk Safety Measures  bed alarm and chair alarm    Vitals  Temperature: 37 °C (98.6 °F)  Temp src: Oral  Pulse: 82  Respiration: 16  Blood Pressure : 108/65  Blood Pressure MAP (Calculated): 79 MM HG  BP Location: Right, Upper Arm  Patient BP Position: Supine     Oxygen  Pulse Oximetry: 97 %  O2 (LPM): 0  O2 Delivery Device: None - Room Air    Bowel and Bladder  Last Bowel Movement  10/28/23  Stool Type  Type 7: Watery, no solid pieces-entirely liquid  Bowel Device     Continent  Bladder: Did not void   Bowel: No movement  Bladder Function  Urine Void (mL): 200 ml  Number of Times Voided: 1  Urine Color: Yellow  Number of Times Incontinent of Urine: 0  Genitourinary Assessment   Bladder Assessment (WDL):  Within Defined Limits  Hagan Catheter: Not Applicable  Urine Color: Yellow  Number of Bladder Accidents: 0  Total Number of Bladder of Accidents in Last 7 Days: 0  Number of Times Incontinent of Urine: 0  Bladder Device: Urinal  Time Void: Yes  Bladder Scan: Post Void  $ Bladder Scan Results (mL):  285    Skin  West Score   19  Sensory Interventions   Bed Types: Standard/Trauma Mattress  Skin Preventative Measures: Pillows in Use for Support / Positioning  Moisture Interventions         Pain  Pain Rating Scale  4 - Distracts me, can do usual activities  Pain Location  Hip  Pain Location Orientation  Right  Pain Interventions   Medication (see MAR), Repositioned, Rest    ADLs    Bathing      Linen Change      Personal Hygiene  Change Cher Pads, Moist Cher Wipes, Perineal Care  Chlorhexidine Bath      Oral Care  Brushed Teeth  Teeth/Dentures     Shave     Nutrition Percentage Eaten  *  * Meal *  *, Lunch, Between % Consumed  Environmental Precautions  Treaded Slipper Socks on Patient, Personal Belongings, Wastebasket, Call Bell etc. in Easy Reach, Report Given to Other Health Care Providers Regarding Fall Risk, Bed in Low Position, Communication Sign for Patients & Families  Patient Turns/Positioning  Patient Turns Self from Side to Side  Patient Turns Assistance/Tolerance     Bed Positions  Bed Controls On, Bed Locked  Head of Bed Elevated  Self regulated      Psychosocial/Neurologic Assessment  Psychosocial Assessment  Psychosocial (WDL):  Within Defined Limits  Neurologic Assessment  Neuro (WDL): Exceptions to WDL  Level of Consciousness: Alert  Orientation Level: Oriented X4  Cognition: Appropriate judgement, Follows commands  Speech: Clear  Motor Function/Sensation Assessment: Sensation  RUE Sensation: Full sensation  LUE Sensation: Full sensation  RLE Sensation: Tingling (Foot)  LLE Sensation: Full sensation  EENT (WDL):  WDL Except    Cardio/Pulmonary Assessment  Edema      Respiratory Breath Sounds     Cardiac Assessment   Cardiac (WDL):  Within Defined Limits

## 2023-10-30 NOTE — CARE PLAN
Problem: Bathing  Goal: STG-Within one week, patient will bathe w/ CGA and AE as needed  Outcome: Met     Problem: Dressing  Goal: STG-Within one week, patient will dress LB w/ min A and AE as needed  Outcome: Met     Problem: Functional Transfers  Goal: STG-Within one week, patient will transfer to step in shower w/ CGA w/ LRAD   Outcome: Met      Left message    GI reminder letter sent

## 2023-10-31 ENCOUNTER — APPOINTMENT (OUTPATIENT)
Dept: OCCUPATIONAL THERAPY | Facility: REHABILITATION | Age: 68
End: 2023-10-31
Attending: PHYSICAL MEDICINE & REHABILITATION
Payer: MEDICARE

## 2023-10-31 VITALS
SYSTOLIC BLOOD PRESSURE: 116 MMHG | HEART RATE: 82 BPM | OXYGEN SATURATION: 97 % | WEIGHT: 127.87 LBS | HEIGHT: 67 IN | TEMPERATURE: 97.7 F | RESPIRATION RATE: 18 BRPM | DIASTOLIC BLOOD PRESSURE: 69 MMHG | BODY MASS INDEX: 20.07 KG/M2

## 2023-10-31 LAB
GLUCOSE BLD STRIP.AUTO-MCNC: 137 MG/DL (ref 65–99)
INR PPP: 4.26 (ref 0.87–1.13)
PROTHROMBIN TIME: 41.7 SEC (ref 12–14.6)

## 2023-10-31 PROCEDURE — 99239 HOSP IP/OBS DSCHRG MGMT >30: CPT | Performed by: PHYSICAL MEDICINE & REHABILITATION

## 2023-10-31 PROCEDURE — 85610 PROTHROMBIN TIME: CPT

## 2023-10-31 PROCEDURE — 82962 GLUCOSE BLOOD TEST: CPT

## 2023-10-31 PROCEDURE — 700102 HCHG RX REV CODE 250 W/ 637 OVERRIDE(OP): Performed by: PHYSICAL MEDICINE & REHABILITATION

## 2023-10-31 PROCEDURE — A9270 NON-COVERED ITEM OR SERVICE: HCPCS | Performed by: PHYSICAL MEDICINE & REHABILITATION

## 2023-10-31 PROCEDURE — 36415 COLL VENOUS BLD VENIPUNCTURE: CPT

## 2023-10-31 PROCEDURE — 700102 HCHG RX REV CODE 250 W/ 637 OVERRIDE(OP): Performed by: HOSPITALIST

## 2023-10-31 PROCEDURE — 97535 SELF CARE MNGMENT TRAINING: CPT

## 2023-10-31 PROCEDURE — A9270 NON-COVERED ITEM OR SERVICE: HCPCS | Performed by: HOSPITALIST

## 2023-10-31 RX ADMIN — OXYCODONE HYDROCHLORIDE 5 MG: 5 TABLET ORAL at 09:03

## 2023-10-31 RX ADMIN — OXYCODONE HYDROCHLORIDE 5 MG: 5 TABLET ORAL at 05:56

## 2023-10-31 RX ADMIN — OXYCODONE HYDROCHLORIDE 5 MG: 5 TABLET ORAL at 02:35

## 2023-10-31 RX ADMIN — OXYCODONE HYDROCHLORIDE AND ACETAMINOPHEN 500 MG: 500 TABLET ORAL at 09:00

## 2023-10-31 RX ADMIN — ACETAMINOPHEN 500 MG: 500 TABLET ORAL at 10:00

## 2023-10-31 RX ADMIN — ACETAMINOPHEN 500 MG: 500 TABLET ORAL at 05:57

## 2023-10-31 RX ADMIN — Medication 324 MG: at 09:00

## 2023-10-31 RX ADMIN — OXYCODONE HYDROCHLORIDE 5 MG: 5 TABLET ORAL at 09:00

## 2023-10-31 RX ADMIN — OMEPRAZOLE 40 MG: 20 CAPSULE, DELAYED RELEASE ORAL at 09:00

## 2023-10-31 ASSESSMENT — BRIEF INTERVIEW FOR MENTAL STATUS (BIMS)
WHAT MONTH IS IT: ACCURATE WITHIN 5 DAYS
BIMS SUMMARY SCORE: 15
ASKED TO RECALL BED: YES, NO CUE REQUIRED
ASKED TO RECALL BLUE: YES, NO CUE REQUIRED
WHAT DAY OF THE WEEK IS IT: CORRECT
INITIAL REPETITION OF BED BLUE SOCK - FIRST ATTEMPT: 3
ASKED TO RECALL SOCK: YES, NO CUE REQUIRED
WHAT YEAR IS IT: CORRECT

## 2023-10-31 ASSESSMENT — ACTIVITIES OF DAILY LIVING (ADL)
TOILETING_LEVEL_OF_ASSIST: ABLE TO COMPLETE TOILETING WITHOUT ASSIST
TOILET_TRANSFER_LEVEL_OF_ASSIST: ABLE TO COMPLETE TOILET TRANSFER WITHOUT ASSIST
SHOWER_TRANSFER_LEVEL_OF_ASSIST: REQUIRES SUPERVISION WITH SHOWER TRANSFER

## 2023-10-31 NOTE — CARE PLAN
"The patient is Stable - Low risk of patient condition declining or worsening    Shift Goals  Clinical Goals: safety  Patient Goals: safety    Problem: Skin Integrity  Goal: Skin integrity is maintained or improved  Outcome: Progressing  Note:   West Score: 19    Patient's skin remains intact and free from new or accidental injury this shift; no s/s of infection. RN wound protocol checked. Encouraged hydration and educated about the importance of nutrition to keep skin integrity. Will continue to monitor.       Problem: Fall Risk - Rehab  Goal: Patient will remain free from falls  Outcome: Progressing  Note: Nancy Tracey Fall risk Assessment Score: 15    High fall risk Interventions   - Alarming seatbelt  - Bed and strip alarm   - Yellow sign by the door   - Yellow wrist band \"Fall risk\"  - Room near to the nurse station  - Do not leave patient unattended in the bathroom  - Fall risk education provided    Moderate fall risk Interventions  - Bed and strip alarm   - Yellow sign by the door   - Yellow wrist band \"Fall risk\"  - Room near to the nurse station  - Do not leave patient unattended in the bathroom  - Fall risk education provided    Low fall risk interventions   - Call light within reach   - Yellow  socks   - Belongings within reach   - Bed in the lowest position            "

## 2023-10-31 NOTE — PROGRESS NOTES
NURSING DAILY NOTE    Name: Hieu Duong   Date of Admission: 10/25/2023   Admitting Diagnosis: Closed right hip fracture, initial encounter ([facility])  Attending Physician: Montserrat Farnsworth D.o.  Allergies: Tdap [dipth, acell pertus, tetanus]    Safety  Patient Assist  min assist  Patient Precautions  Fall Risk, Weight Bearing As Tolerated Right Lower Extremity, Posterior Hip Precautions  Precaution Comments     Bed Transfer Status  Modified Independent  Toilet Transfer Status   Standby Assist  Assistive Devices  Rails, Walker - front wheel  Oxygen  None - Room Air  Diet/Therapeutic Dining  Current Diet Order   Procedures    Diet Order Diet: Consistent CHO (Diabetic)     Pill Administration  whole  Agitated Behavioral Scale     ABS Level of Severity       Fall Risk  Has the patient had a fall this admission?   No  Nancy Tracey Fall Risk Scoring  15, HIGH RISK  Fall Risk Safety Measures  bed alarm and chair alarm    Vitals  Temperature: 36.9 °C (98.5 °F)  Temp src: Oral  Pulse: 84  Respiration: 18  Blood Pressure : 127/60  Blood Pressure MAP (Calculated): 82 MM HG  BP Location: Right, Upper Arm  Patient BP Position: Supine     Oxygen  Pulse Oximetry: 97 %  O2 (LPM): 0  O2 Delivery Device: None - Room Air    Bowel and Bladder  Last Bowel Movement  10/30/23  Stool Type  Type 4: Like a sausage or snake, smooth and soft  Bowel Device     Continent  Bladder: Continent void   Bowel: Continent movement  Bladder Function  Urine Void (mL): 350 ml  Number of Times Voided: 1  Urine Color: Yellow  Number of Times Incontinent of Urine: 0  Genitourinary Assessment   Bladder Assessment (WDL):  Within Defined Limits  Hagan Catheter: Not Applicable  Urine Color: Yellow  Number of Bladder Accidents: 0  Total Number of Bladder of Accidents in Last 7 Days: 0  Number of Times Incontinent of Urine: 0  Bladder Device: Urinal  Time Void: Yes  Bladder Scan: Post Void  $ Bladder  Scan Results (mL): 162    Skin  West Score   19  Sensory Interventions   Bed Types: Standard/Trauma Mattress  Skin Preventative Measures: Pillows in Use for Support / Positioning  Moisture Interventions         Pain  Pain Rating Scale  7 - Focus of attention, prevents doing daily activities  Pain Location  Hip  Pain Location Orientation  Right  Pain Interventions   Declines    ADLs    Bathing      Linen Change      Personal Hygiene  Change Cher Pads, Moist Cher Wipes, Perineal Care  Chlorhexidine Bath      Oral Care  Brushed Teeth  Teeth/Dentures     Shave     Nutrition Percentage Eaten  Lunch, Between 25-50% Consumed  Environmental Precautions  Treaded Slipper Socks on Patient  Patient Turns/Positioning  Patient Turns Self from Side to Side  Patient Turns Assistance/Tolerance     Bed Positions  Bed Controls On, Bed Locked  Head of Bed Elevated  Self regulated      Psychosocial/Neurologic Assessment  Psychosocial Assessment  Psychosocial (WDL):  Within Defined Limits  Neurologic Assessment  Neuro (WDL): Exceptions to WDL  Level of Consciousness: Alert  Orientation Level: Oriented X4  Cognition: Appropriate judgement, Follows commands  Speech: Clear  Motor Function/Sensation Assessment: Sensation  RUE Sensation: Full sensation  LUE Sensation: Full sensation  RLE Sensation: Tingling  LLE Sensation: Full sensation  EENT (WDL):  WDL Except    Cardio/Pulmonary Assessment  Edema      Respiratory Breath Sounds     Cardiac Assessment   Cardiac (WDL):  Within Defined Limits

## 2023-10-31 NOTE — THERAPY
"Occupational Therapy   Discharge Summary     Patient Name: Hieu Duong  Age:  68 y.o., Sex:  male  Medical Record #: [id number]  Today's Date: 10/31/2023     Precautions  Precautions: Fall Risk, Weight Bearing As Tolerated Right Lower Extremity, Posterior Hip Precautions    Subjective    Patient in bed upon arrival, agreeable to participate in OT.      Objective     10/31/23 0931   OT Charge Group   OT Self Care / ADL (Units) 4   OT Total Time Spent   OT Individual Total Time Spent (Mins) 60   Precautions   Precautions Fall Risk;Weight Bearing As Tolerated Right Lower Extremity;Posterior Hip Precautions   Vitals   O2 Delivery Device None - Room Air   Functional Level of Assist   Grooming Modified Independent   Toileting Modified Independent   Toilet Transfers Modified Independent   Eating   Assistance Needed Independent   CARE Score - Eating 6   Oral Hygiene   Assistance Needed Independent   CARE Score - Oral Hygiene 6   Toileting Hygiene   Assistance Needed Independent   CARE Score - Toileting Hygiene 6   Shower/Bathe Self   Assistance Needed Supervision   CARE Score - Shower/Bathe Self 4   Upper Body Dressing   Assistance Needed Independent   CARE Score - Upper Body Dressing 6   Lower Body Dressing   Assistance Needed Independent   CARE Score - Lower Body Dressing 6   Putting On/Taking Off Footwear   Assistance Needed Independent   CARE Score - Putting On/Taking Off Footwear 6   Toilet Transfer   Assistance Needed Independent   CARE Score - Toilet Transfer 6   Cognitive Pattern Assessment   Cognitive Pattern Assessment Used BIMS   Brief Interview for Mental Status (BIMS)   Repetition of Three Words (First Attempt) 3   Temporal Orientation: Year Correct   Temporal Orientation: Month Accurate within 5 days   Temporal Orientation: Day Correct   Recall: \"Sock\" Yes, no cue required   Recall: \"Blue\" Yes, no cue required   Recall: \"Bed\" Yes, no cue required   BIMS Summary Score 15   Confusion Assessment Method " (CAM)   Is there evidence of an acute change in mental status from the patient's baseline? No   Inattention Behavior not present   Disorganized thinking Behavior not present   Altered level of consciousness Behavior not present   Discharge Summary    Discharge Location  Home   Patient Discharging with Assist of Family    Level of Supervision Required Intermittent Supervision   Recommended Equipment for Discharge Front-Wheeled Walker;Shower Chair;Grab Bars in Tub / Shower;Hand Held Shower Head;Sock Aid;Reacher;Long Handled Shoe Horn;Dressing Stick   Recommended Services Upon Discharge Home Health Occupational Therapy   Long Term Goals Met 3   Long Term Goals Not Met 0   Criteria for Termination of Services Maximum Function Achieved for Inpatient Rehabilitation   Discharge Instructions to Patient   Level of Assist Required for Eating Able to Complete Eating without Assist   Level of Assist Required for Grooming Able to Complete Grooming without Assist   Level of Assist Required for Dressing Able to Complete Dressing without Assist   Equipment for Dressing Sock Aid;Reacher;Long Handled Shoe Horn;Dressing Stick   Level of Assist Required for Toileting Able to Complete Toileting without Assist   Level of Assist Required for Toilet Transfer Able to Complete Toilet Transfer without Assist   Equipment for Toilet Transfer Grab Bars by Toilet   Level of Assist Required for Bathing Requires Supervision with Bathing   Level of Assist Required for Shower Transfer Requires Supervision with Shower Transfer   Level of Assist Required for Home Mgmt Requires Supervision with Home Management   Level of Assist Required for Meal Prep Requires Supervision with Meal Preparation   Driving Please Contact Physician Prior to Driving   Home Exercise Program Refer to Home Exercise Program Handout for Details     Caregiver training completed w/ patient's friend David. Reviewed all safety recommendations and adaptive techniques for ADL/IADL  routine and functional mobility.    Reviewed OT goals in passport. Patient verbalized understanding re: all recommendations.     Assessment    Patient ready to DC home today w/ family support. Verbalized understanding re: all recommendations.   Strengths: Willingly participates in therapeutic activities, Supportive family, Pleasant and cooperative, Motivated for self care and independence, Independent prior level of function, Good insight into deficits/needs, Alert and oriented, Adequate strength, Effective communication skills    Plan    DC home today w/ friend and family support.       Occupational Therapy Goals (Active)       There are no active problems.

## 2023-10-31 NOTE — PROGRESS NOTES
Pharmacy Warfarin Consult   10/31/2023     68 y.o.   male     Indication for anticoagulation: Stroke, Chronic anticoagulation    Goal INR = 2 to 3     Recent Labs     10/30/23  0544 10/30/23  0910 10/31/23  0539   INR 4.60* 4.41* 4.26*   HEMOGLOBIN 11.0*  --   --    HEMATOCRIT 32.9*  --   --        Pertinent Drug/Drug Interactions:  PPI    Outpatient Warfarin Regimen:  2.5 mg (5 mg x 0.5) every Tue, Thu, Sat; 5 mg (5 mg x 1) all other days  Recent Warfarin Dosing:    Dose from last 7 days       Date/Time Dose (mg)    10/31/23 0539 0    10/30/23 1038 0    10/29/23 1050 5    10/29/23 0800 5    10/28/23 1050 2.5    10/27/23 0844 5    10/26/23 1138 2.5    10/25/23 1338 5            Bridge Therapy: No        1. Hold coumadin tonight for INR = 4.26  2. Change to INR daily draws until INR stabilizes.        TKenyregz [facility]

## 2023-10-31 NOTE — PROGRESS NOTES
Pt medicated with Tylenol 650 & oxycodone 5 mg with pain 7/10 to Right hip prior to discharge.  Friend came to discharge patient.    Patient discharged to home per order.  Reviewed all discharge instructions, appointments, discharge medications, and wound care instructions with patient and friend; they verbalize understanding.  Education provided in discharge instructions about medications (patient's own Insulin pens returned) and Home Safety and Fall Prevention. Pt explained he has a different PCP/Addiction physician from Marcin Bonds who is retired to [employer] at [facility] in [place]. He will make an appointment and follow with them. Discharge paperwork completed; signed copies in chart.  Patient has education binder and all belongings; signed copy in chart.  Pt alert, calm, stable; no change in status from morning assessment.  Patient left facility at 1130 via wheelchair accompanied by friend; escorted to car by STACIE Tamayo.  Have enjoyed working with this pleasant patient.

## 2023-10-31 NOTE — DISCHARGE INSTRUCTIONS
[facility] NURSING DISCHARGE INSTRUCTIONS    Blood Pressure : 127/60  Weight: 58 kg (127 lb 13.9 oz)  Nursing recommendations for Hieu Duong at time of discharge are as follows:  Client verbalized understanding of all discharge instructions and prescriptions.     Review all your home medications and newly ordered medications with your doctor and/or pharmacist. Follow medication instructions as directed by your doctor and/or pharmacist.    Pain Management:   Discharge Pain Medication Instructions:  Comfort Goal: Comfort with Movement, Perform Activity  Intervention: Medication (see MAR), Repositioned, Rest  Notify your primary care provider if pain is unrelieved with these measures, if the pain is new, or increased in intensity.    Discharge Skin Characteristics: Warm, Dry  Discharge Skin Exam: Clear  Wound 10/25/23 Hip Right incision (Active)   Wound Image   10/25/23 1400   Site Assessment JESUS ALBERTO 10/30/23 0715   Periwound Assessment JESUS ALBERTO 10/30/23 0715   Margins JESUS ALBERTO 10/30/23 2100   Closure JESUS ALBERTO 10/30/23 0715   Drainage Amount JESUS ALBERTO 10/30/23 0715   Drainage Description Sanguineous 10/25/23 1400   Treatments Cleansed;Site care;Offloading 10/25/23 1400   Wound Cleansing Foam Cleanser/Washcloth 10/25/23 1400   Dressing Status Clean;Dry;Intact 10/27/23 0750   Dressing Changed Observed 10/26/23 2000   Dressing Options [place] Dressing 10/30/23 0715   Dressing Change/Treatment Frequency Every 48 hrs, and As Needed 10/30/23 2100   NEXT Weekly Photo (Inpatient Only) 10/30/23 10/25/23 1400       Wound 10/25/23 Elbow Right wound x2 (Active)   Wound Image   10/25/23 1400   Site Assessment JESUS ALBERTO 10/30/23 0715   Periwound Assessment JESUS ALBERTO 10/30/23 0715   Margins JESUS ALBERTO 10/30/23 2100   Closure JESUS ALBERTO 10/30/23 0715   Drainage Amount JESUS ALBERTO 10/30/23 0715   Drainage Description Tan;Sanguineous 10/25/23 1400   Treatments Cleansed;Site care 10/25/23 1400   Wound Cleansing Foam Cleanser/Washcloth 10/25/23 1400   Dressing  Status Clean;Dry;Intact 10/26/23 2000   Dressing Changed Observed 10/26/23 2000   Dressing Options Silicone Adhesive Foam 10/30/23 0715   Dressing Change/Treatment Frequency Every 48 hrs, and As Needed 10/30/23 2100   NEXT Weekly Photo (Inpatient Only) 10/30/23 10/25/23 1400     Skin / Wound Care Instructions: Please contact your primary care physician for any change in skin integrity.     If You Have Surgical Incisions / Wounds:  Monitor surgical site(s) for signs of increased swelling, redness or symptoms of drainage from the site or fever as this could indicate signs and symptoms of infection. If these symptoms are noted, notifiy your primary care provider.      Discharge Safety Instructions: Should Not Be Left Alone In The House     Discharge Safety Concerns: Weakness  The interdisciplinary team has made recommendation that you should not be left alone  in the house due to weakness  Anti-embolic stockings are not required to increase circulation to the lower extremities.    Discharge Diet: Diabetic diet     Discharge Liquids: Thin liquids  Discharge Bowel Function: Continent  Please contact your primary care physician for any changes in bowel habits.  Discharge Bowel Program:    Discharge Bladder Function: Continent  Discharge Urinary Devices: None      Nursing Discharge Plan:        Case Management Discharge Instructions:   Discharge Location:    Agency Name/Address/Phone:    Home Health:    Outpatient Services:    DME Provider/Phone:    Medical Equipment Ordered:    Prescription Faxed to:        Discharge Medication Instructions:  Below are the medications your physician expects you to take upon discharge:      Fall Prevention in the Home, Adult  Falls can cause injuries and affect people of all ages. There are many simple things that you can do to make your home safe and to help prevent falls. Ask for help when making these changes, if needed.  What actions can I take to prevent falls?  General  instructions  Use good lighting in all rooms. Replace any light bulbs that burn out, turn on lights if it is dark, and use night-lights.  Place frequently used items in easy-to-reach places. Lower the shelves around your home if necessary.  Set up furniture so that there are clear paths around it. Avoid moving your furniture around.  Remove throw rugs and other tripping hazards from the floor.  Avoid walking on wet floors.  Fix any uneven floor surfaces.  Add color or contrast paint or tape to grab bars and handrails in your home. Place contrasting color strips on the first and last steps of staircases.  When you use a stepladder, make sure that it is completely opened and that the sides and supports are firmly locked. Have someone hold the ladder while you are using it. Do not climb a closed stepladder.  Know where your pets are when moving through your home.  What can I do in the bathroom?         Keep the floor dry. Immediately clean up any water that is on the floor.  Remove soap buildup in the tub or shower regularly.  Use nonskid mats or decals on the floor of the tub or shower.  Attach bath mats securely with double-sided, nonslip rug tape.  If you need to sit down while you are in the shower, use a plastic, nonslip stool.  Install grab bars by the toilet and in the tub and shower. Do not use towel bars as grab bars.  What can I do in the bedroom?  Make sure that a bedside light is easy to reach.  Do not use oversized bedding that reaches the floor.  Have a firm chair that has side arms to use for getting dressed.  What can I do in the kitchen?  Clean up any spills right away.  If you need to reach for something above you, use a sturdy step stool that has a grab bar.  Keep electrical cables out of the way.  Do not use floor polish or wax that makes floors slippery. If you must use wax, make sure that it is non-skid floor wax.  What can I do with my stairs?  Do not leave any items on the stairs.  Make sure  that you have a light switch at the top and the bottom of the stairs. Have them installed if you do not have them.  Make sure that there are handrails on both sides of the stairs. Fix handrails that are broken or loose. Make sure that handrails are as long as the staircases.  Install non-slip stair treads on all stairs in your home.  Avoid having throw rugs at the top or bottom of stairs, or secure the rugs with carpet tape to prevent them from moving.  Choose a carpet design that does not hide the edge of steps on the stairs.  Check any carpeting to make sure that it is firmly attached to the stairs. Fix any carpet that is loose or worn.  What can I do on the outside of my home?  Use bright outdoor lighting.  Regularly repair the edges of walkways and driveways and fix any cracks.  Remove high doorway thresholds.  Trim any shrubbery on the main path into your home.  Regularly check that handrails are securely fastened and in good repair. Both sides of all steps should have handrails.  Install guardrails along the edges of any raised decks or porches.  Clear walkways of debris and clutter, including tools and rocks.  Have leaves, snow, and ice cleared regularly.  Use sand or salt on walkways during winter months.  In the garage, clean up any spills right away, including grease or oil spills.  What other actions can I take?  Wear closed-toe shoes that fit well and support your feet. Wear shoes that have rubber soles or low heels.  Use mobility aids as needed, such as canes, walkers, scooters, and crutches.  Review your medicines with your health care provider. Some medicines can cause dizziness or changes in blood pressure, which increase your risk of falling.  Talk with your health care provider about other ways that you can decrease your risk of falls. This may include working with a physical therapist or  to improve your strength, balance, and endurance.  Where to find more information  Centers for Disease  Control and Prevention, [place]: www.cdc.gov  National [place] on Aging: www.vale.nih.gov  Contact a health care provider if:  You are afraid of falling at home.  You feel weak, drowsy, or dizzy at home.  You fall at home.  Summary  There are many simple things that you can do to make your home safe and to help prevent falls.  Ways to make your home safe include removing tripping hazards and installing grab bars in the bathroom.  Ask for help when making these changes in your home.  This information is not intended to replace advice given to you by your health care provider. Make sure you discuss any questions you have with your health care provider.  Document Revised: 09/19/2022 Document Reviewed: 07/21/2021  [employer] Patient Education © 2023 [employer]      Depression / Suicide Risk    As you are discharged from this [facility] facility, it is important to learn how to keep safe from harming yourself.    Recognize the warning signs:  Abrupt changes in personality, positive or negative- including increase in energy   Giving away possessions  Change in eating patterns- significant weight changes-  positive or negative  Change in sleeping patterns- unable to sleep or sleeping all the time   Unwillingness or inability to communicate  Depression  Unusual sadness, discouragement and loneliness  Talk of wanting to die  Neglect of personal appearance   Rebelliousness- reckless behavior  Withdrawal from people/activities they love  Confusion- inability to concentrate     If you or a loved one observes any of these behaviors or has concerns about self-harm, here's what you can do:  Talk about it- your feelings and reasons for harming yourself  Remove any means that you might use to hurt yourself (examples: pills, rope, extension cords, firearm)  Get professional help from the community (Mental Health, Substance Abuse, psychological counseling)  Do not be alone:Call your Safe Contact- someone whom you trust who will be  there for you.  Call your local CRISIS HOTLINE 527-4994 or 394-699-9564  Call your local Children's Mobile Crisis Response Team [place] (505) 261-6170 or www.PHARMAJET  Call the toll free National Suicide Prevention Hotlines   National Suicide Prevention Lifeline 581-041-BRTZ (6077)  [facility] Line Network 800-SUICIDE (629-6527)    Physical Therapy Discharge Instructions for Hieu Duong    10/31/2023    Level of Assist Required for Ambulation: No Assist on Flat Surfaces, Supervision on Curbs, Supervision on Stairs  Distance Patient May Ambulate: as tolerated 150+ ft  Device Recommended for Ambulation: Front-Wheeled Walker  Level of Assist Required for Transfers: Requires No Assist  Device Recommended for Transfers: Front-Wheeled Walker  Home Exercise Program: Refer to Home Exercise Program Handout for Details

## 2023-11-02 RX ORDER — HYDROXYZINE 50 MG/1
50 TABLET, FILM COATED ORAL 3 TIMES DAILY PRN
Qty: 90 TABLET | Refills: 1 | Status: SHIPPED | OUTPATIENT
Start: 2023-11-02 | End: 2024-01-02

## 2023-11-02 RX ORDER — ESCITALOPRAM OXALATE 20 MG/1
20 TABLET ORAL EVERY MORNING
Qty: 30 TABLET | Refills: 3 | Status: SHIPPED | OUTPATIENT
Start: 2023-11-02 | End: 2024-03-04

## 2023-11-06 ENCOUNTER — TELEPHONE (OUTPATIENT)
Dept: VASCULAR LAB | Facility: MEDICAL CENTER | Age: 68
End: 2023-11-06
Payer: MEDICARE

## 2023-11-06 NOTE — TELEPHONE ENCOUNTER
Received VM from pt - he fell and was admitted. They stopped his warfarin as his INR was supratherapeutic and was wondering when he can restart it.    S/w pt - he spoke w/ Dr Molina who instructed him to restart warfarin 2.5mg daily on 11/4.     Will have pt continue w/ warfarin 2.5mg daily until INR check on 11/8    Amy Dyer, PharmD

## 2023-11-08 ENCOUNTER — ANTICOAGULATION VISIT (OUTPATIENT)
Dept: MEDICAL GROUP | Facility: PHYSICIAN GROUP | Age: 68
End: 2023-11-08
Payer: MEDICARE

## 2023-11-08 DIAGNOSIS — Z79.01 CHRONIC ANTICOAGULATION: ICD-10-CM

## 2023-11-08 LAB — INR PPP: 1.8 (ref 2–3.5)

## 2023-11-08 PROCEDURE — 85610 PROTHROMBIN TIME: CPT | Performed by: NURSE PRACTITIONER

## 2023-11-08 PROCEDURE — 99211 OFF/OP EST MAY X REQ PHY/QHP: CPT | Performed by: NURSE PRACTITIONER

## 2023-11-08 RX ORDER — ENALAPRIL MALEATE 2.5 MG/1
2.5 TABLET ORAL DAILY
COMMUNITY
Start: 2023-08-21 | End: 2023-12-11 | Stop reason: DRUGHIGH

## 2023-11-08 RX ORDER — INSULIN LISPRO-AABC 100 [IU]/ML
3-4 INJECTION, SOLUTION SUBCUTANEOUS
COMMUNITY
Start: 2023-09-20

## 2023-11-08 RX ORDER — OXYCODONE HYDROCHLORIDE 5 MG/1
5 TABLET ORAL
COMMUNITY
Start: 2023-10-25

## 2023-11-08 NOTE — PROGRESS NOTES
Anticoagulation Summary  As of 2023      INR goal:  2.0-3.0   TTR:  84.8 % (5.8 y)   INR used for dosin.80 (2023)   Warfarin maintenance plan:  2.5 mg (5 mg x 0.5) every e, Thu, Sat; 5 mg (5 mg x 1) all other days   Weekly warfarin total:  27.5 mg   Plan last modified:  Jordan So, PharmD (2023)   Next INR check:  11/15/2023   Target end date:  Indefinite    Indications    Stroke (CMS-HCC) [I63.9] (Resolved) [I63.9]  Chronic anticoagulation [Z79.01]                 Anticoagulation Episode Summary       INR check location:  Anticoagulation Clinic    Preferred lab:      Send INR reminders to:      Comments:            Anticoagulation Care Providers       Provider Role Specialty Phone number    [facility] Responsible  [phone]    Marshal Sethi M.D. Responsible Neurology [phone]          Anticoagulation Patient Findings  Patient Findings       Positives:  Change in activity (Decreased physical activity d/t recent events but doing physical therapy), Missed doses (Held warfarin Wed/Thu/Fri d/t elevated INR on Tue. Resumed warfarin 2.5 mg daily on Sat.), Change in diet/appetite (Decreased appetite), Hospital admission (Admitted to [facility] 10/20-10/25/23 d/t R femoral neck fracture secondary to GLF s/p R hip hemiarthosplasty. He was discharged to inpatient rehab on 10/25/23 then discharged home on 10/31/23)    Negatives:  Signs/symptoms of thrombosis, Signs/symptoms of bleeding, Laboratory test error suspected, Change in health, Change in alcohol use, Upcoming invasive procedure, Emergency department visit, Upcoming dental procedure, Extra doses, Change in medications, Bruising, Other complaints             HPI:   Hieu Duong seen in clinic today, on anticoagulation therapy with warfarin due to history of CVA.    Patient's previous INR was therapeutic at 2.5 on 23, at which time patient was instructed to continue regimen. Patient returns to clinic  today to recheck INR to ensure it is therapeutic and thus preventing possible clotting and/or bleeding/bruising complications. Of note, patient was admitted to [facility] 10/20-10/25/23 d/t R femoral neck fracture secondary to GLF s/p R hip hemiarthosplasty. He was discharged to inpatient rehab on 10/25/23 then discharged home on 10/31/23    Does patient have any changes to current medical/health status since last appt: Yes, see above  Does patient have any signs/symptoms of bleeding and/or thrombosis since the last appt: No  Does patient have any interval changes to diet or medications since last appt: No  Are there any complications or cost restrictions with current therapy: No     Does patient have [facility] PCP? Yes, Marcin Bonds M.D. (If not, please document discussion that patient must be seen at [facility])     Vitals:  There were no vitals filed for this visit.     Asssessment:      INR slightly subtherapeutic at 1.8, therefore at increased risk of stroke or VTE  Reason(s) for out of range INR today:  following held/reduced doses     Due to changes in appetite, activity level, and pain, warfarin requirements are likely to vary over the next weeks. Patient has been taking lower doses of warfarin the past days. As appetite and activity level begin to improve, INR may trend down on 2.5 mg daily. INRs have been at goal on historical regimen of 27.5-30 mg/week. Will reduce regimen to 25 mg/week (9.1% decrease) and follow closely.    Pt is not on antiplatelet therapy    Medication reconciliation completed today.    Plan:  Decrease to 5 mg Mon/Wed/Fri, 2.5 mg all other days     Follow up:  Because warfarin is a high risk medication and current CHEST guidelines recommend regular monitoring intervals (few days up to 12 weeks), will have patient return to clinic in 1 weeks to recheck INR.    Arron Quezada, MaryD, BCACP

## 2023-11-15 ENCOUNTER — ANTICOAGULATION VISIT (OUTPATIENT)
Dept: CARDIOLOGY | Facility: PHYSICIAN GROUP | Age: 68
End: 2023-11-15
Payer: MEDICARE

## 2023-11-15 DIAGNOSIS — Z79.01 CHRONIC ANTICOAGULATION: ICD-10-CM

## 2023-11-15 LAB — INR PPP: 2.7 (ref 2–3.5)

## 2023-11-15 PROCEDURE — 85610 PROTHROMBIN TIME: CPT | Performed by: NURSE PRACTITIONER

## 2023-11-15 PROCEDURE — 93793 ANTICOAG MGMT PT WARFARIN: CPT | Performed by: NURSE PRACTITIONER

## 2023-11-15 NOTE — PROGRESS NOTES
Anticoagulation Summary  As of 11/15/2023      INR goal:  2.0-3.0   TTR:  84.8 % (5.8 y)   INR used for dosin.70 (11/15/2023)   Warfarin maintenance plan:  2.5 mg (5 mg x 0.5) every Tue, Thu, Sat; 5 mg (5 mg x 1) all other days   Weekly warfarin total:  27.5 mg   Plan last modified:  Amy Dyer, PharmD (11/15/2023)   Next INR check:  2023   Target end date:  Indefinite    Indications    Stroke (CMS-HCC) [I63.9] (Resolved) [I63.9]  Chronic anticoagulation [Z79.01]                 Anticoagulation Episode Summary       INR check location:  Anticoagulation Clinic    Preferred lab:      Send INR reminders to:      Comments:            Anticoagulation Care Providers       Provider Role Specialty Phone number    [facility] Responsible  [phone]    Marshal Sethi M.D. Responsible Neurology [phone]          Anticoagulation Patient Findings  Patient Findings       Negatives:  Signs/symptoms of thrombosis, Signs/symptoms of bleeding, Laboratory test error suspected, Change in health, Change in alcohol use, Change in activity, Upcoming invasive procedure, Emergency department visit, Upcoming dental procedure, Missed doses, Extra doses, Change in medications, Change in diet/appetite, Hospital admission, Bruising, Other complaints                  HPI:   Hieu Duong seen in clinic today, on anticoagulation therapy with warfarin due to history of CVA.    Patient's previous INR was subtherapeutic at 1.8 on 11, at which time patient was instructed to take 5mg MWF, 2.5mg AOD. Patient returns to clinic today to recheck INR to ensure it is therapeutic and thus preventing possible clotting and/or bleeding/bruising complications.    CHADS-VASc = n/a    Does patient have any changes to current medical/health status since last appt: No  Does patient have any signs/symptoms of bleeding and/or thrombosis since the last appt: No  Does patient have any interval changes to diet or medications  since last appt: No  Are there any complications or cost restrictions with current therapy: No     Does patient have [facility] PCP? Yes, Marcin Bonds M.D. (If not, please document discussion that patient must be seen at [facility])     Vitals:  declined    There were no vitals filed for this visit.     Asssessment:      INR is therapeutic at 2.7, therefore decreasing risk of stroke/VTE and bleeding  Reason(s) for out of range INR today: N/A      Pt is not on antiplatelet therapy.    Medication reconciliation completed today.    Warfarin dosing recommendation:  Pt wants to resume his normal regimen of 2.5mg Sun/Tue/Thurs, 5mg AOD with alternating between 2.5mg and 5mg every other Thursday. INR was supratherapeutic 2 weeks ago d/t recent hip fracture.     Follow up:  Because warfarin is a high risk medication and current CHEST guidelines recommend regular monitoring intervals (few days up to 12 weeks), will have patient return to clinic in 3 weeks to recheck INR - pt unable to get INR checked sooner.    Amy Dyer, PharmD

## 2023-12-06 ENCOUNTER — ANTICOAGULATION VISIT (OUTPATIENT)
Dept: CARDIOLOGY | Facility: PHYSICIAN GROUP | Age: 68
End: 2023-12-06
Payer: MEDICARE

## 2023-12-06 DIAGNOSIS — Z79.01 CHRONIC ANTICOAGULATION: ICD-10-CM

## 2023-12-06 LAB — INR PPP: 3.5 (ref 2–3.5)

## 2023-12-06 PROCEDURE — 85610 PROTHROMBIN TIME: CPT | Performed by: NURSE PRACTITIONER

## 2023-12-06 PROCEDURE — 99211 OFF/OP EST MAY X REQ PHY/QHP: CPT | Performed by: FAMILY MEDICINE

## 2023-12-06 NOTE — PROGRESS NOTES
Anticoagulation Summary  As of 12/6/2023      INR goal:  2.0-3.0   TTR:  84.3 % (5.8 y)   INR used for dosing:  3.50 (12/6/2023)   Warfarin maintenance plan:  2.5 mg (5 mg x 0.5) every Tue, Thu, Sat; 5 mg (5 mg x 1) all other days   Weekly warfarin total:  27.5 mg   Plan last modified:  Amy Dyer, PharmD (11/15/2023)   Next INR check:  12/20/2023   Target end date:  Indefinite    Indications    Stroke (CMS-HCC) [I63.9] (Resolved) [I63.9]  Chronic anticoagulation [Z79.01]                 Anticoagulation Episode Summary       INR check location:  Anticoagulation Clinic    Preferred lab:      Send INR reminders to:      Comments:            Anticoagulation Care Providers       Provider Role Specialty Phone number    [facility] Responsible  [phone]    Marshal Sethi M.D. Responsible Neurology [phone]          Anticoagulation Patient Findings  Patient Findings       Positives:  Change in diet/appetite (possibly decreased greens)    Negatives:  Signs/symptoms of thrombosis, Signs/symptoms of bleeding, Laboratory test error suspected, Change in health, Change in alcohol use, Change in activity, Upcoming invasive procedure, Emergency department visit, Upcoming dental procedure, Missed doses, Extra doses, Change in medications, Hospital admission, Bruising, Other complaints              HPI:   Hieu Duong seen in clinic today, on anticoagulation therapy with warfarin due to history of CVA.    Patient's previous INR was therapeutic at 2.7 on 11/15/23, at which time patient was instructed to resume historical regimen of 2.5 mg Sun/Tue/Thurs, 5 mg AOD with alternating between 2.5 mg and 5 mg every other Thursday. Patient returns to clinic today to recheck INR to ensure it is therapeutic and thus preventing possible clotting and/or bleeding/bruising complications.    CHADS-VASc = n/a    Does patient have any changes to current medical/health status since last appt: No  Does patient  have any signs/symptoms of bleeding and/or thrombosis since the last appt: No  Does patient have any interval changes to diet or medications since last appt: No  Are there any complications or cost restrictions with current therapy: No     Does patient have [facility] PCP? Yes, Marcin Bonds M.D. (If not, please document discussion that patient must be seen at [facility])     Vitals:  declined  There were no vitals filed for this visit.     Asssessment:      INR is supratherapeutic at 3.5, therefore increasing risk of bleeding. Will hold x 1 then rechallenge regimen.  Reason(s) for out of range INR today:  decreased vitamin K intake       Pt is not on antiplatelet therapy.    Medication reconciliation completed today.    Warfarin dosing recommendation:  Hold today, then continue 2.5 mg Sun/Tue/Thu, 5 mg AOD with alternating between 2.5 mg and 5 mg every other Thu.     Follow up:  Because warfarin is a high risk medication and current CHEST guidelines recommend regular monitoring intervals (few days up to 12 weeks), will have patient return to clinic in 2 weeks to recheck INR.    Arron Quezada, PharmD

## 2023-12-11 ENCOUNTER — OFFICE VISIT (OUTPATIENT)
Dept: MEDICAL GROUP | Facility: PHYSICIAN GROUP | Age: 68
End: 2023-12-11
Payer: MEDICARE

## 2023-12-11 VITALS
HEIGHT: 67 IN | DIASTOLIC BLOOD PRESSURE: 62 MMHG | BODY MASS INDEX: 20.56 KG/M2 | TEMPERATURE: 97.7 F | HEART RATE: 94 BPM | RESPIRATION RATE: 12 BRPM | OXYGEN SATURATION: 96 % | SYSTOLIC BLOOD PRESSURE: 108 MMHG | WEIGHT: 131 LBS

## 2023-12-11 DIAGNOSIS — Z86.73 HISTORY OF CVA (CEREBROVASCULAR ACCIDENT): ICD-10-CM

## 2023-12-11 DIAGNOSIS — Z79.01 CHRONIC ANTICOAGULATION: ICD-10-CM

## 2023-12-11 DIAGNOSIS — S72.001A CLOSED RIGHT HIP FRACTURE, INITIAL ENCOUNTER (HCC): ICD-10-CM

## 2023-12-11 DIAGNOSIS — E10.9 TYPE 1 DIABETES MELLITUS WITHOUT COMPLICATION (HCC): ICD-10-CM

## 2023-12-11 PROCEDURE — 99214 OFFICE O/P EST MOD 30 MIN: CPT | Performed by: FAMILY MEDICINE

## 2023-12-11 PROCEDURE — 3074F SYST BP LT 130 MM HG: CPT | Performed by: FAMILY MEDICINE

## 2023-12-11 PROCEDURE — 3078F DIAST BP <80 MM HG: CPT | Performed by: FAMILY MEDICINE

## 2023-12-11 RX ORDER — OMEPRAZOLE 40 MG/1
40 CAPSULE, DELAYED RELEASE ORAL
Qty: 100 CAPSULE | Refills: 3 | Status: SHIPPED | OUTPATIENT
Start: 2023-12-11

## 2023-12-11 RX ORDER — OMEPRAZOLE 40 MG/1
40 CAPSULE, DELAYED RELEASE ORAL
COMMUNITY
Start: 2023-11-18 | End: 2023-12-11 | Stop reason: SDUPTHER

## 2023-12-11 RX ORDER — HYDROXYZINE 50 MG/1
1 TABLET, FILM COATED ORAL 3 TIMES DAILY PRN
COMMUNITY
Start: 2023-11-02

## 2023-12-11 RX ORDER — ENALAPRIL MALEATE 2.5 MG/1
2.5 TABLET ORAL DAILY
Qty: 100 TABLET | Refills: 3 | Status: SHIPPED | OUTPATIENT
Start: 2023-12-11

## 2023-12-11 RX ORDER — TIZANIDINE 4 MG/1
4 TABLET ORAL
COMMUNITY
Start: 2023-11-24

## 2023-12-11 RX ORDER — ESCITALOPRAM OXALATE 20 MG/1
20 TABLET ORAL DAILY
COMMUNITY
Start: 2023-12-01

## 2023-12-11 RX ORDER — ENALAPRIL MALEATE 5 MG/1
2.5 TABLET ORAL DAILY
COMMUNITY
Start: 2023-11-06 | End: 2023-12-11 | Stop reason: DRUGHIGH

## 2023-12-11 RX ORDER — INSULIN DEGLUDEC 200 U/ML
14 INJECTION, SOLUTION SUBCUTANEOUS DAILY
COMMUNITY
Start: 2023-09-20

## 2023-12-11 RX ORDER — SODIUM FLUORIDE 6 MG/ML
1 PASTE, DENTIFRICE DENTAL
COMMUNITY
Start: 2023-11-28

## 2023-12-11 RX ORDER — FERROUS GLUCONATE 324(38)MG
324 TABLET ORAL DAILY
COMMUNITY
Start: 2023-10-31

## 2023-12-11 ASSESSMENT — ENCOUNTER SYMPTOMS
HEMOPTYSIS: 0
HEADACHES: 0
CHILLS: 0
FEVER: 0
GASTROINTESTINAL NEGATIVE: 1
CONSTITUTIONAL NEGATIVE: 1
TINGLING: 0
CARDIOVASCULAR NEGATIVE: 1
DIZZINESS: 0
MYALGIAS: 0
HEARTBURN: 0
DEPRESSION: 0
RESPIRATORY NEGATIVE: 1
EYES NEGATIVE: 1
PALPITATIONS: 0
NEUROLOGICAL NEGATIVE: 1
DOUBLE VISION: 0
NAUSEA: 0
COUGH: 0
BLURRED VISION: 0
PSYCHIATRIC NEGATIVE: 1
BRUISES/BLEEDS EASILY: 0

## 2023-12-11 ASSESSMENT — FIBROSIS 4 INDEX: FIB4 SCORE: 0.9

## 2023-12-11 NOTE — PROGRESS NOTES
Subjective     Hieu Duong is a 68 y.o. male who presents with [facility]            1. Type 1 diabetes mellitus without complication ([facility])  Sees endo  Has tight control and very knowledgeable     Referral to Ophthalmology   HEMOGLOBIN A1C; Future   Lipid Profile; Future   CBC WITHOUT DIFFERENTIAL; Future   Comp Metabolic Panel; Future    2. Closed right hip fracture, initial encounter ([facility])  Last visit of home pt today and then will be going to outpatient, ambulating well with no aid    3. History of CVA (cerebrovascular accident)  On thinner    4. Chronic anticoagulation      Past Medical History:  No date: Diabetes ([facility])  No date: GI bleed      Comment:  after taking blood thinner  No date: Hyperlipidemia  No date: Hypertension  No date: Stroke ([facility])  No date: Vision loss      Comment:  peripheral right eye due to cva  No past surgical history on file.  Social History    Tobacco Use      Smoking status: Never      Smokeless tobacco: Never    Alcohol use: No    Drug use: No    Review of patient's family history indicates:  Problem: Diabetes      Relation: Brother          Age of Onset: (Not Specified)      Current Outpatient Medications: ·  enalapril (VASOTEC) 2.5 MG Tab, Take 1 Tablet by mouth every day., Disp: 100 Tablet, Rfl: 3·  omeprazole (PRILOSEC) 40 MG delayed-release capsule, Take 1 Capsule by mouth every morning before breakfast. EFORE BREAKFAST, Disp: 100 Capsule, Rfl: 3·  sildenafil citrate (VIAGRA) 100 MG tablet, Take 100 mg by mouth 1 time a day as needed for Erectile Dysfunction., Disp: , Rfl: ·  tizanidine (ZANAFLEX) 4 MG Tab, Take 1 Tablet by mouth every 6 hours as needed (spasm)., Disp: 30 Tablet, Rfl: 3·  diclofenac sodium (VOLTAREN) 1 % Gel, Apply 2 g topically 4 times a day as needed (spasm)., Disp: 350 g, Rfl: 3·  Insulin Lispro-aabc, 1 U Dial, (OPALUMARTIV KWDYLLANPEN) 100 UNIT/ML Solution Pen-injector, Inject 3-4 Units under the skin 3 times a day before meals., Disp: , Rfl: ·   escitalopram (LEXAPRO) 20 MG tablet, Take 1 Tablet by mouth every morning., Disp: 30 Tablet, Rfl: 3·  hydrOXYzine HCl (ATARAX) 50 MG Tab, Take 1 Tablet by mouth 3 times a day as needed for Anxiety., Disp: 90 Tablet, Rfl: 1·  atorvastatin (LIPITOR) 20 MG Tab, Take 1 Tablet by mouth every day., Disp: 30 Tablet, Rfl: 2·  ferrous gluconate (FERGON) 324 (38 Fe) MG Tab, Take 1 Tablet by mouth every morning with breakfast., Disp: 30 Tablet, Rfl: 2·  melatonin 3 MG Tab, Take 2 Tablets by mouth at bedtime., Disp: 60 Tablet, Rfl: 2·  warfarin (COUMADIN) 5 MG Tab, TAKE ONE-HALF TO ONE TABLET BY MOUTH DAILY OR AS DIRECTED BY ANTICOAGULATION CLINIC, Disp: 90 Tablet, Rfl: 1·  Glucagon (GVOKE PFS) 1 MG/0.2ML Solution Prefilled Syringe, Inject 1 Each under the skin., Disp: , Rfl: ·  Insulin Degludec (TRESIBA) 100 UNIT/ML Solution, Inject 14 Units under the skin every day., Disp: , Rfl: ·  diclofenac sodium (VOLTAREN) 1 % Gel, Apply 2 g topically. (Patient not taking: Reported on 12/11/2023), Disp: , Rfl: ·  escitalopram (LEXAPRO) 20 MG tablet, Take 20 mg by mouth every day. (Patient not taking: Reported on 12/11/2023), Disp: , Rfl: ·  ferrous gluconate (FERGON) 324 (38 Fe) MG Tab, Take 324 mg by mouth every day. (Patient not taking: Reported on 12/11/2023), Disp: , Rfl: ·  hydrOXYzine HCl (ATARAX) 50 MG Tab, Take 1 Tablet by mouth 3 times a day as needed. (Patient not taking: Reported on 12/11/2023), Disp: , Rfl: ·  TRESIBA FLEXTOUCH 200 UNIT/ML Solution Pen-injector, Inject 14 Units under the skin every day. (Patient not taking: Reported on 12/11/2023), Disp: , Rfl: ·  PREVIDENT 5000 BOOSTER PLUS 1.1 % Paste, Apply 1 Application to teeth. (Patient not taking: Reported on 12/11/2023), Disp: , Rfl: ·  tizanidine (ZANAFLEX) 4 MG Tab, Take 4 mg by mouth. (Patient not taking: Reported on 12/11/2023), Disp: , Rfl: ·  oxyCODONE immediate-release (ROXICODONE) 5 MG Tab, Take 5 mg by mouth. (Patient not taking: Reported on 12/11/2023),  "Disp: , Rfl: ·  omeprazole (PRILOSEC) 20 MG delayed-release capsule, Take 1 Capsule by mouth every day. (Patient not taking: Reported on 12/11/2023), Disp: 30 Capsule, Rfl: 2·  Sodium Fluoride (PREVIDENT 5000 BOOSTER DT), Apply  to teeth., Disp: , Rfl:     Patient was instructed on the use of medications, either prescriptions or OTC and informed on when the appropriate follow up time period should be. In addition, patient was also instructed that should any acute worsening occur that they should notify this clinic asap or call 911.              Review of Systems   Constitutional: Negative.  Negative for chills and fever.   HENT: Negative.  Negative for hearing loss.    Eyes: Negative.  Negative for blurred vision and double vision.   Respiratory: Negative.  Negative for cough and hemoptysis.    Cardiovascular: Negative.  Negative for chest pain and palpitations.   Gastrointestinal: Negative.  Negative for heartburn and nausea.   Genitourinary: Negative.  Negative for dysuria.   Musculoskeletal:  Positive for joint pain. Negative for myalgias.   Skin: Negative.  Negative for rash.   Neurological: Negative.  Negative for dizziness, tingling and headaches.   Endo/Heme/Allergies: Negative.  Does not bruise/bleed easily.   Psychiatric/Behavioral: Negative.  Negative for depression and suicidal ideas.    All other systems reviewed and are negative.             Objective     /62 (BP Location: Left arm, Patient Position: Sitting, BP Cuff Size: Adult)   Pulse 94   Temp 36.5 °C (97.7 °F) (Temporal)   Resp 12   Ht 1.702 m (5' 7\")   Wt 59.4 kg (131 lb)   SpO2 96%   BMI 20.52 kg/m²      Physical Exam  Vitals and nursing note reviewed.   Constitutional:       General: He is not in acute distress.     Appearance: He is well-developed. He is not diaphoretic.   HENT:      Head: Normocephalic and atraumatic.      Mouth/Throat:      Pharynx: No oropharyngeal exudate.   Eyes:      Pupils: Pupils are equal, round, and " reactive to light.   Cardiovascular:      Rate and Rhythm: Normal rate and regular rhythm.      Heart sounds: Normal heart sounds. No murmur heard.     No friction rub. No gallop.   Pulmonary:      Effort: Pulmonary effort is normal. No respiratory distress.      Breath sounds: Normal breath sounds. No wheezing or rales.   Chest:      Chest wall: No tenderness.   Neurological:      Mental Status: He is alert and oriented to person, place, and time.   Psychiatric:         Behavior: Behavior normal.         Thought Content: Thought content normal.         Judgment: Judgment normal.                             Assessment & Plan        1. Type 1 diabetes mellitus without complication (HCC)    - Referral to Ophthalmology  - HEMOGLOBIN A1C; Future  - Lipid Profile; Future  - CBC WITHOUT DIFFERENTIAL; Future  - Comp Metabolic Panel; Future    2. Closed right hip fracture, initial encounter ([facility])      3. History of CVA (cerebrovascular accident)      4. Chronic anticoagulation

## 2023-12-20 ENCOUNTER — ANTICOAGULATION VISIT (OUTPATIENT)
Dept: CARDIOLOGY | Facility: PHYSICIAN GROUP | Age: 68
End: 2023-12-20
Payer: MEDICARE

## 2023-12-20 DIAGNOSIS — Z79.01 CHRONIC ANTICOAGULATION: ICD-10-CM

## 2023-12-20 LAB — INR PPP: 2.4 (ref 2–3.5)

## 2023-12-20 PROCEDURE — 85610 PROTHROMBIN TIME: CPT | Performed by: NURSE PRACTITIONER

## 2023-12-20 PROCEDURE — 93793 ANTICOAG MGMT PT WARFARIN: CPT | Performed by: NURSE PRACTITIONER

## 2023-12-20 NOTE — PROGRESS NOTES
Anticoagulation Summary  As of 2023      INR goal:  2.0-3.0   TTR:  84.1 % (5.9 y)   INR used for dosin.40 (2023)   Warfarin maintenance plan:  2.5 mg (5 mg x 0.5) every Tue, Thu, Sat; 5 mg (5 mg x 1) all other days   Weekly warfarin total:  27.5 mg   Plan last modified:  Mary FariasD (11/15/2023)   Next INR check:  1/3/2024   Target end date:  Indefinite    Indications    Stroke (CMS-HCC) [I63.9] (Resolved) [I63.9]  Chronic anticoagulation [Z79.01]                 Anticoagulation Episode Summary       INR check location:  Anticoagulation Clinic    Preferred lab:      Send INR reminders to:      Comments:            Anticoagulation Care Providers       Provider Role Specialty Phone number    [facility] Responsible  [phone]    Marshal Sethi M.D. Responsible Neurology [phone]                  Refer to Patient Findings for HPI:  Patient Findings       Negatives:  Signs/symptoms of thrombosis, Signs/symptoms of bleeding, Laboratory test error suspected, Change in health, Change in alcohol use, Change in activity, Upcoming invasive procedure, Emergency department visit, Upcoming dental procedure, Missed doses, Extra doses, Change in medications, Change in diet/appetite, Hospital admission, Bruising, Other complaints            There were no vitals filed for this visit.  Pt declined vitals    Verified current warfarin dosing schedule.    Medications reconciled: No  Pt is not on antiplatelet therapy.      A/P   INR is therapeutic    Warfarin dosing recommendation: Continue regimen as listed above.    Pt educated to contact our clinic with any changes in medications or s/s of bleeding or thrombosis. Pt is aware to seek immediate medical attention for falls, head injury or deep cuts.    Request pt to return in 2 week(s). Pt agrees.    Sis Horne, MaryD

## 2024-01-01 DIAGNOSIS — G47.09 OTHER INSOMNIA: ICD-10-CM

## 2024-01-02 RX ORDER — HYDROXYZINE 50 MG/1
50 TABLET, FILM COATED ORAL 3 TIMES DAILY PRN
Qty: 90 TABLET | Refills: 1 | Status: SHIPPED | OUTPATIENT
Start: 2024-01-02 | End: 2024-01-30

## 2024-01-03 ENCOUNTER — ANTICOAGULATION VISIT (OUTPATIENT)
Dept: CARDIOLOGY | Facility: PHYSICIAN GROUP | Age: 69
End: 2024-01-03
Payer: MEDICARE

## 2024-01-03 VITALS
DIASTOLIC BLOOD PRESSURE: 63 MMHG | RESPIRATION RATE: 14 BRPM | HEART RATE: 70 BPM | SYSTOLIC BLOOD PRESSURE: 117 MMHG | OXYGEN SATURATION: 94 %

## 2024-01-03 DIAGNOSIS — Z79.01 CHRONIC ANTICOAGULATION: Primary | ICD-10-CM

## 2024-01-03 LAB — INR PPP: 2.2 (ref 2–3.5)

## 2024-01-03 PROCEDURE — 93793 ANTICOAG MGMT PT WARFARIN: CPT | Performed by: PHARMACIST

## 2024-01-03 PROCEDURE — 3078F DIAST BP <80 MM HG: CPT | Performed by: FAMILY MEDICINE

## 2024-01-03 PROCEDURE — 85610 PROTHROMBIN TIME: CPT | Performed by: NURSE PRACTITIONER

## 2024-01-03 PROCEDURE — 3074F SYST BP LT 130 MM HG: CPT | Performed by: FAMILY MEDICINE

## 2024-01-03 NOTE — PROGRESS NOTES
Anticoagulation Summary  As of 1/3/2024      INR goal:  2.0-3.0   TTR:  84.2 % (5.9 y)   INR used for dosin.20 (1/3/2024)   Warfarin maintenance plan:  2.5 mg (5 mg x 0.5) every Tue, Thu, Sat; 5 mg (5 mg x 1) all other days   Weekly warfarin total:  27.5 mg   Plan last modified:  Amy Dyer, PharmD (11/15/2023)   Next INR check:  2024   Target end date:  Indefinite    Indications    Stroke (CMS-HCC) [I63.9] (Resolved) [I63.9]  Chronic anticoagulation [Z79.01]                 Anticoagulation Episode Summary       INR check location:  Anticoagulation Clinic    Preferred lab:      Send INR reminders to:      Comments:            Anticoagulation Care Providers       Provider Role Specialty Phone number    [facility] Responsible  [phone]    Marshal Sethi M.D. Responsible Neurology [phone]          Anticoagulation Patient Findings  Patient Findings       Negatives:  Signs/symptoms of thrombosis, Signs/symptoms of bleeding, Laboratory test error suspected, Change in health, Change in alcohol use, Change in activity, Upcoming invasive procedure, Emergency department visit, Upcoming dental procedure, Missed doses, Extra doses, Change in medications, Change in diet/appetite, Hospital admission, Bruising, Other complaints                  HPI:   Hieu Duong seen in clinic today, on anticoagulation therapy with warfarin due to history of stroke.    Patient's previous INR was therapeutic at 2.4 on 23, at which time patient was instructed to continue with current warfarin regimen.  He returns to clinic today to recheck INR to ensure it is therapeutic and thus preventing possible clotting and/or bleeding/bruising complications.    CHADS-VASc = n/a  (unadjusted ischemic stroke risk/year:  n/a)    Does patient have any changes to current medical/health status since last appt (Y/N):  NO  Does patient have any signs/symptoms of bleeding and/or thrombosis since the last appt  (Y/N):  NO  Does patient have any interval changes to diet or medications since last appt (Y/N):  NO  Are there any complications or cost restrictions with current therapy (Y/N):  NO     Does patient have [facility] PCP? Yes, Doug Molina M.D. (If not, please document discussion that patient must be seen at [facility])       Vitals:      Vitals:    01/03/24 0910   BP: 117/63   BP Location: Right arm   Patient Position: Sitting   BP Cuff Size: Large adult   Pulse: 70   Resp: 14   SpO2: 94%        Asssessment:      INR therapeutic at 2.2, therefore decreasing stroke and/or bleeding risk.   Reason(s) for out of range INR today:  n/a      Pt is not on antiplatelet therapy    Medication reconciliation completed today.    Plan:  Pt is to continue with current warfarin dosing regimen.     Follow up:  Because warfarin is a high risk medication and current CHEST guidelines recommend regular monitoring intervals (few days up to 12 weeks), will have patient return to clinic in 4 weeks to recheck INR (per patient preference).    Dalton Taveras, PharmD, BCACP

## 2024-01-27 DIAGNOSIS — G47.09 OTHER INSOMNIA: ICD-10-CM

## 2024-01-30 RX ORDER — HYDROXYZINE 50 MG/1
50 TABLET, FILM COATED ORAL 3 TIMES DAILY PRN
Qty: 270 TABLET | Refills: 1 | Status: SHIPPED | OUTPATIENT
Start: 2024-01-30

## 2024-01-30 NOTE — TELEPHONE ENCOUNTER
Received request via: Patient    Was the patient seen in the last year in this department? Yes    Does the patient have an active prescription (recently filled or refills available) for medication(s) requested? No    [facility]    Does the patient have [facility] and need 100 day supply (blood pressure, diabetes and cholesterol meds only)? Medication is not for cholesterol, blood pressure or diabetes

## 2024-01-31 ENCOUNTER — ANTICOAGULATION VISIT (OUTPATIENT)
Dept: MEDICAL GROUP | Facility: PHYSICIAN GROUP | Age: 69
End: 2024-01-31
Payer: MEDICARE

## 2024-01-31 VITALS
OXYGEN SATURATION: 98 % | HEART RATE: 62 BPM | RESPIRATION RATE: 15 BRPM | DIASTOLIC BLOOD PRESSURE: 60 MMHG | SYSTOLIC BLOOD PRESSURE: 105 MMHG

## 2024-01-31 DIAGNOSIS — Z79.01 CHRONIC ANTICOAGULATION: Primary | ICD-10-CM

## 2024-01-31 LAB — INR PPP: 2.6 (ref 2–3.5)

## 2024-01-31 PROCEDURE — 3074F SYST BP LT 130 MM HG: CPT | Performed by: NURSE PRACTITIONER

## 2024-01-31 PROCEDURE — 3078F DIAST BP <80 MM HG: CPT | Performed by: NURSE PRACTITIONER

## 2024-01-31 PROCEDURE — 85610 PROTHROMBIN TIME: CPT | Performed by: NURSE PRACTITIONER

## 2024-01-31 PROCEDURE — 93793 ANTICOAG MGMT PT WARFARIN: CPT | Performed by: NURSE PRACTITIONER

## 2024-01-31 NOTE — PROGRESS NOTES
Anticoagulation Summary  As of 2024      INR goal:  2.0-3.0   TTR:  84.4 % (6 y)   INR used for dosin.60 (2024)   Warfarin maintenance plan:  2.5 mg (5 mg x 0.5) every Tue, Thu, Sat; 5 mg (5 mg x 1) all other days   Weekly warfarin total:  27.5 mg   Plan last modified:  Amy Dyer, PharmD (11/15/2023)   Next INR check:  3/13/2024   Target end date:  Indefinite    Indications    Stroke (CMS-HCC) [I63.9] (Resolved) [I63.9]  Chronic anticoagulation [Z79.01]                 Anticoagulation Episode Summary       INR check location:  Anticoagulation Clinic    Preferred lab:      Send INR reminders to:      Comments:            Anticoagulation Care Providers       Provider Role Specialty Phone number    [facility] Responsible  [phone]    Marshal Sethi M.D. Responsible Neurology [phone]          Anticoagulation Patient Findings  Patient Findings       Positives:  Missed doses, Extra doses, Change in medications    Negatives:  Signs/symptoms of thrombosis, Signs/symptoms of bleeding, Laboratory test error suspected, Change in health, Change in alcohol use, Change in activity, Upcoming invasive procedure, Emergency department visit, Upcoming dental procedure, Change in diet/appetite, Hospital admission, Bruising, Other complaints                  HPI:   Hieu Duong seen in clinic today, on anticoagulation therapy with warfarin due to history of stroke.    Patient's previous INR was therapeutic at 2.2 on 1-3-24, at which time patient was instructed to continue with current warfarin regimen.  He returns to clinic today to recheck INR to ensure it is therapeutic and thus preventing possible clotting and/or bleeding/bruising complications.    CHADS-VASc = n/a  (unadjusted ischemic stroke risk/year:  n/a)    Does patient have any changes to current medical/health status since last appt (Y/N):  NO  Does patient have any signs/symptoms of bleeding and/or thrombosis since the  last appt (Y/N):  NO  Does patient have any interval changes to diet or medications since last appt (Y/N):  missed dose this week, took extra the next day.  Started once weekly supplement, unsure of name.  Are there any complications or cost restrictions with current therapy (Y/N):  NO     Does patient have [facility] PCP? Yes, Doug Molina M.D. (If not, please document discussion that patient must be seen at [facility])       Vitals:      Vitals:    01/31/24 0913   BP: 105/60   BP Location: Right arm   Patient Position: Sitting   BP Cuff Size: Large adult   Pulse: 62   Resp: 15   SpO2: 98%        Asssessment:      INR therapeutic at 2.6, therefore decreasing stroke and/or bleeding  risk.   Reason(s) for out of range INR today:  n/a      Pt is not on antiplatelet therapy    Medication reconciliation completed today.    Plan:  Pt is to continue with current warfarin dosing regimen.     Follow up:  Because warfarin is a high risk medication and current CHEST guidelines recommend regular monitoring intervals (few days up to 12 weeks), will have patient return to clinic in 6 weeks to recheck INR.    Dalton Taveras, PharmD, BCACP

## 2024-02-21 ENCOUNTER — OFFICE VISIT (OUTPATIENT)
Dept: MEDICAL GROUP | Facility: PHYSICIAN GROUP | Age: 69
End: 2024-02-21
Payer: MEDICARE

## 2024-02-21 VITALS
TEMPERATURE: 97.9 F | BODY MASS INDEX: 21.5 KG/M2 | HEART RATE: 64 BPM | WEIGHT: 137 LBS | SYSTOLIC BLOOD PRESSURE: 110 MMHG | HEIGHT: 67 IN | DIASTOLIC BLOOD PRESSURE: 60 MMHG | RESPIRATION RATE: 18 BRPM | OXYGEN SATURATION: 97 %

## 2024-02-21 DIAGNOSIS — Z87.81 HISTORY OF HIP FRACTURE: ICD-10-CM

## 2024-02-21 DIAGNOSIS — Z00.00 MEDICARE ANNUAL WELLNESS VISIT, SUBSEQUENT: ICD-10-CM

## 2024-02-21 DIAGNOSIS — E10.9 TYPE 1 DIABETES MELLITUS WITHOUT COMPLICATION (HCC): ICD-10-CM

## 2024-02-21 DIAGNOSIS — Z23 NEED FOR VACCINATION: ICD-10-CM

## 2024-02-21 PROCEDURE — 3074F SYST BP LT 130 MM HG: CPT | Performed by: FAMILY MEDICINE

## 2024-02-21 PROCEDURE — G0009 ADMIN PNEUMOCOCCAL VACCINE: HCPCS | Performed by: FAMILY MEDICINE

## 2024-02-21 PROCEDURE — 90677 PCV20 VACCINE IM: CPT | Performed by: FAMILY MEDICINE

## 2024-02-21 PROCEDURE — G0438 PPPS, INITIAL VISIT: HCPCS | Mod: 25 | Performed by: FAMILY MEDICINE

## 2024-02-21 PROCEDURE — 3078F DIAST BP <80 MM HG: CPT | Performed by: FAMILY MEDICINE

## 2024-02-21 ASSESSMENT — PATIENT HEALTH QUESTIONNAIRE - PHQ9: CLINICAL INTERPRETATION OF PHQ2 SCORE: 0

## 2024-02-21 ASSESSMENT — ACTIVITIES OF DAILY LIVING (ADL): BATHING_REQUIRES_ASSISTANCE: 0

## 2024-02-21 ASSESSMENT — ENCOUNTER SYMPTOMS: GENERAL WELL-BEING: GOOD

## 2024-02-21 ASSESSMENT — FIBROSIS 4 INDEX: FIB4 SCORE: 1.39

## 2024-02-21 NOTE — PROGRESS NOTES
Chief Complaint   Patient presents with    Annual Exam       HPI:  Hieu Duong is a 68 y.o. here for Medicare Annual Wellness Visit     Patient Active Problem List    Diagnosis Date Noted    Thrombocytosis 10/30/2023    Closed right hip fracture, initial encounter (HCC) 10/25/2023    Azotemia 10/25/2023    Anemia 10/25/2023    Depression 10/25/2023    Hypophosphatemia 10/25/2023    Type 1 diabetes mellitus without complication (HCC) 07/12/2021    Late effect of stroke 04/12/2021    Benign prostatic hyperplasia without lower urinary tract symptoms 01/04/2021    Chronic anticoagulation 01/04/2021    Essential hypertension 01/04/2021    Mixed hyperlipidemia 01/04/2021    Other insomnia 01/04/2021    IDDM (insulin dependent diabetes mellitus) 06/19/2019    History of CVA (cerebrovascular accident) 06/19/2019    History of GI bleed 06/19/2019    Vision loss        Current Outpatient Medications   Medication Sig Dispense Refill    hydrOXYzine HCl (ATARAX) 50 MG Tab TAKE 1 TABLET BY MOUTH THREE TIMES A DAY AS NEEDED FOR ANXIETY 270 Tablet 1    enalapril (VASOTEC) 2.5 MG Tab Take 1 Tablet by mouth every day. 100 Tablet 3    omeprazole (PRILOSEC) 40 MG delayed-release capsule Take 1 Capsule by mouth every morning before breakfast. EFORE BREAKFAST 100 Capsule 3    sildenafil citrate (VIAGRA) 100 MG tablet Take 100 mg by mouth 1 time a day as needed for Erectile Dysfunction.      Insulin Lispro-aabc, 1 U Dial, (OPALUMJEAAKASH KWDYLLANPEN) 100 UNIT/ML Solution Pen-injector Inject 3-4 Units under the skin 3 times a day before meals.      atorvastatin (LIPITOR) 20 MG Tab Take 1 Tablet by mouth every day. 30 Tablet 2    melatonin 3 MG Tab Take 2 Tablets by mouth at bedtime. 60 Tablet 2    warfarin (COUMADIN) 5 MG Tab TAKE ONE-HALF TO ONE TABLET BY MOUTH DAILY OR AS DIRECTED BY ANTICOAGULATION CLINIC 90 Tablet 1    Glucagon (GVOKE PFS) 1 MG/0.2ML Solution Prefilled Syringe Inject 1 Each under the skin.      diclofenac sodium  (VOLTAREN) 1 % Gel Apply 2 g topically. (Patient not taking: Reported on 12/11/2023)      escitalopram (LEXAPRO) 20 MG tablet Take 20 mg by mouth every day. (Patient not taking: Reported on 12/11/2023)      ferrous gluconate (FERGON) 324 (38 Fe) MG Tab Take 324 mg by mouth every day. (Patient not taking: Reported on 12/11/2023)      hydrOXYzine HCl (ATARAX) 50 MG Tab Take 1 Tablet by mouth 3 times a day as needed. (Patient not taking: Reported on 12/11/2023)      TRESIBA FLEXTOUCH 200 UNIT/ML Solution Pen-injector Inject 14 Units under the skin every day. (Patient not taking: Reported on 12/11/2023)      PREVIDENT 5000 BOOSTER PLUS 1.1 % Paste Apply 1 Application to teeth. (Patient not taking: Reported on 12/11/2023)      tizanidine (ZANAFLEX) 4 MG Tab Take 4 mg by mouth. (Patient not taking: Reported on 12/11/2023)      tizanidine (ZANAFLEX) 4 MG Tab Take 1 Tablet by mouth every 6 hours as needed (spasm). 30 Tablet 3    diclofenac sodium (VOLTAREN) 1 % Gel Apply 2 g topically 4 times a day as needed (spasm). 350 g 3    oxyCODONE immediate-release (ROXICODONE) 5 MG Tab Take 5 mg by mouth. (Patient not taking: Reported on 12/11/2023)      escitalopram (LEXAPRO) 20 MG tablet Take 1 Tablet by mouth every morning. 30 Tablet 3    omeprazole (PRILOSEC) 20 MG delayed-release capsule Take 1 Capsule by mouth every day. (Patient not taking: Reported on 12/11/2023) 30 Capsule 2    ferrous gluconate (FERGON) 324 (38 Fe) MG Tab Take 1 Tablet by mouth every morning with breakfast. 30 Tablet 2    Sodium Fluoride (PREVIDENT 5000 BOOSTER DT) Apply  to teeth.      Insulin Degludec (TRESIBA) 100 UNIT/ML Solution Inject 14 Units under the skin every day.       No current facility-administered medications for this visit.          Current supplements as per medication list.     Allergies: Tetanus toxoids and Tdap [dipth, acell pertus, tetanus]    Current social contact/activities: family     He  reports that he has never smoked. He has  never used smokeless tobacco. He reports that he does not drink alcohol and does not use drugs.  Counseling given: Not Answered      ROS:    Gait: Uses no assistive device  Ostomy: No  Other tubes: No  Amputations: No  Chronic oxygen use: No  Last eye exam: 2023  Wears hearing aids: No   : Denies any urinary leakage during the last 6 months    Screening:    Depression Screening  Little interest or pleasure in doing things?  0 - not at all  Feeling down, depressed , or hopeless? 0 - not at all  Patient Health Questionnaire Score: 0     If depressive symptoms identified deferred to follow up visit unless specifically addressed in assessment and plan.    Interpretation of PHQ-9 Total Score   Score Severity   1-4 No Depression   5-9 Mild Depression   10-14 Moderate Depression   15-19 Moderately Severe Depression   20-27 Severe Depression    Screening for Cognitive Impairment  Do you or any of your friends or family members have any concern about your memory?    Three Minute Recall (Banana, Sunrise, Chair) 3/3    Richard clock face with all 12 numbers and set the hands to show 20 past 8.  Yes    Cognitive concerns identified deferred for follow up unless specifically addressed in assessment and plan.    Fall Risk Assessment  Has the patient had two or more falls in the last year or any fall with injury in the last year?  No    Safety Assessment  Do you always wear your seatbelt?  Yes  Any changes to home needed to function safely? Yes  Difficulty hearing.  No  Patient counseled about all safety risks that were identified.    Functional Assessment ADLs  Are there any barriers preventing you from cooking for yourself or meeting nutritional needs?  No.    Are there any barriers preventing you from driving safely or obtaining transportation?  No.    Are there any barriers preventing you from using a telephone or calling for help?  No    Are there any barriers preventing you from shopping?  No.    Are there any barriers  preventing you from taking care of your own finances?  No    Are there any barriers preventing you from managing your medications?  No    Are there any barriers preventing you from showering, bathing or dressing yourself? No    Are there any barriers preventing you from doing housework or laundry? No  Are there any barriers preventing you from using the toilet?No  Are you currently engaging in any exercise or physical activity?  Yes.      Self-Assessment of Health  What is your perception of your health? Good  Do you sleep more than six hours a night? Yes  In the past 7 days, how much did pain keep you from doing your normal work? Some  Do you spend quality time with family or friends (virtually or in person)? Yes  Do you usually eat a heart healthy diet that constists of a variety of fruits, vegetables, whole grains and fiber? Yes  Do you eat foods high in fat and/or Fast Food more than three times per week? Yes    Advance Care Planning  Do you have an Advance Directive, Living Will, Durable Power of , or POLST? Yes    Living Will            Health Maintenance Summary            Overdue - Diabetes: Retinopathy Screening (Yearly) Overdue since 11/12/2021 11/12/2020  Done              Overdue - Diabetes: Monofilament / LE Exam (Yearly) Overdue since 7/7/2022 07/07/2021  Done              Ordered - Fasting Lipid Profile (Yearly) Ordered on 12/11/2023 01/13/2022  Outside Procedure: LIPID PROFILE    05/13/2021  Outside Procedure: LIPID PROFILE              Overdue - Pneumococcal Vaccine: 65+ Years (3 of 3 - PPSV23 or PCV20) Overdue since 12/19/2023 12/19/2018  Imm Admin: Pneumococcal polysaccharide vaccine (PPSV-23)    12/07/2015  Imm Admin: Pneumococcal Conjugate Vaccine (Prevnar/PCV-13)    06/28/2012  Imm Admin: Pneumococcal polysaccharide vaccine (PPSV-23)              Overdue - Diabetes: Urine Protein Screening (Yearly) Overdue since 1/19/2024 01/19/2023  MICROALBUMIN CREAT RATIO  URINE    01/13/2022  MICROALBUMIN CREAT RATIO URINE    07/07/2021  Done    05/13/2021  MICROALBUMIN CREAT RATIO URINE              A1c Screening (Every 6 Months) Tentatively due on 6/22/2024 12/22/2023  HEMOGLOBIN A1C    10/26/2023  HEMOGLOBIN A1C    10/22/2023  HEMOGLOBIN A1C    03/13/2023  POCT A1C    12/12/2022  POCT A1C    Only the first 5 history entries have been loaded, but more history exists.              SERUM CREATININE (Yearly) Next due on 12/22/2024 12/22/2023  COMP METABOLIC PANEL    12/22/2023  COMP METABOLIC PANEL    10/30/2023  Basic Metabolic Panel    10/26/2023  Comp Metabolic Panel (CMP)    10/24/2023  BASIC METABOLIC PANEL    Only the first 5 history entries have been loaded, but more history exists.              Annual Wellness Visit (Yearly) Next due on 2/21/2025 02/21/2024  Visit Dx: Medicare annual wellness visit, subsequent    07/06/2021  Done              Colorectal Cancer Screening (Colonoscopy - Every 10 Years) Next due on 1/6/2031 01/06/2021  Colonoscopy (Done)    07/07/2010  Colonoscopy (Done)              Zoster (Shingles) Vaccines (Series Information) Completed      12/31/2020  Imm Admin: Zoster Vaccine Recombinant (RZV) (SHINGRIX)    09/08/2020  Imm Admin: Zoster Vaccine Recombinant (RZV) (SHINGRIX)    05/13/2013  Imm Admin: Zoster Vaccine Live (ZVL) (Zostavax) - HISTORICAL DATA              Influenza Vaccine (Series Information) Completed      10/09/2023  Outside Immunization: Influenza Vaccine, Quadrivalent, Adjuvanted    10/19/2022  Outside Immunization: Fluzone High-Dose Quad    09/22/2021  Imm Admin: Influenza Vaccine Adult HD    09/23/2020  Imm Admin: Influenza Vaccine Adult HD    08/05/2020  Imm Admin: Influenza Vaccine Quad Inj (Pf)    Only the first 5 history entries have been loaded, but more history exists.              COVID-19 Vaccine (Series Information) Completed      12/13/2023  Outside Immunization: COVID-19(PFR) 12yrs \T\ up    09/27/2022  Imm  "Admin: MODERNA BIVALENT BOOSTER SARS-COV-2 VACCINE (6+)    04/16/2022  Imm Admin: MODERNA SARS-COV-2 VACCINE (12+)    11/03/2021  Imm Admin: MODERNA SARS-COV-2 VACCINE (12+)    04/01/2021  Imm Admin: MODERNA SARS-COV-2 VACCINE (12+)    Only the first 5 history entries have been loaded, but more history exists.              Hepatitis A Vaccine (Hep A) (Series Information) Aged Out      No completion history exists for this topic.              HPV Vaccines (Series Information) Aged Out      No completion history exists for this topic.              Polio Vaccine (Inactivated Polio) (Series Information) Aged Out      No completion history exists for this topic.              Meningococcal Immunization (Series Information) Aged Out      No completion history exists for this topic.              Discontinued - Hepatitis B Vaccine (Hep B)  Discontinued      No completion history exists for this topic.              Discontinued - IMM DTaP/Tdap/Td Vaccine  Discontinued      No completion history exists for this topic.              Discontinued - Hepatitis C Screening  Discontinued      No completion history exists for this topic.                    Patient Care Team:  Doug Molina M.D. as PCP - General (Family Medicine)        Social History     Tobacco Use    Smoking status: Never    Smokeless tobacco: Never   Substance Use Topics    Alcohol use: No    Drug use: No     Family History   Problem Relation Age of Onset    Diabetes Brother      He  has a past medical history of Diabetes (HCC), GI bleed, Hyperlipidemia, Hypertension, Stroke (HCC), and Vision loss.   No past surgical history on file.    Exam:   /60   Pulse 64   Temp 36.6 °C (97.9 °F) (Temporal)   Resp 18   Ht 1.702 m (5' 7\")   Wt 62.1 kg (137 lb)   SpO2 97%  Body mass index is 21.46 kg/m².    Hearing good.    Dentition good  Alert, oriented in no acute distress.  Eye contact is good, speech goal directed, affect calm    Assessment and Plan. The " following treatment and monitoring plan is recommended:    1. Medicare annual wellness visit, subsequent    2. Type 1 diabetes mellitus without complication (HCC)    3. History of hip fracture    Utd on hm  Currently treated for DM, taking meds and checking bs at home, trying to do DM diet.controlled  Sees endo      Services suggested: No services needed at this time  Health Care Screening: Age-appropriate preventive services recommended by USPTF and ACIP covered by Medicare were discussed today. Services ordered if indicated and agreed upon by the patient.  Referrals offered: Community-based lifestyle interventions to reduce health risks and promote self-management and wellness, fall prevention, nutrition, physical activity, tobacco-use cessation, weight loss, and mental health services as per orders if indicated.    Discussion today about general wellness and lifestyle habits:    Prevent falls and reduce trip hazards; Cautioned about securing or removing rugs.  Have a working fire alarm and carbon monoxide detector;   Engage in regular physical activity and social activities     Follow-up: No follow-ups on file.

## 2024-03-01 DIAGNOSIS — G47.09 OTHER INSOMNIA: ICD-10-CM

## 2024-03-01 NOTE — TELEPHONE ENCOUNTER
Received request via: Patient    Was the patient seen in the last year in this department? Yes    Does the patient have an active prescription (recently filled or refills available) for medication(s) requested? No    [facility]    Does the patient have [facility] and need 100 day supply (blood pressure, diabetes and cholesterol meds only)? Patient does not have SCP

## 2024-03-04 RX ORDER — ESCITALOPRAM OXALATE 20 MG/1
20 TABLET ORAL EVERY MORNING
Qty: 90 TABLET | Refills: 1 | Status: SHIPPED | OUTPATIENT
Start: 2024-03-04

## 2024-03-13 ENCOUNTER — ANTICOAGULATION VISIT (OUTPATIENT)
Dept: MEDICAL GROUP | Facility: PHYSICIAN GROUP | Age: 69
End: 2024-03-13
Payer: MEDICARE

## 2024-03-13 VITALS
DIASTOLIC BLOOD PRESSURE: 74 MMHG | OXYGEN SATURATION: 95 % | RESPIRATION RATE: 15 BRPM | HEART RATE: 58 BPM | BODY MASS INDEX: 21.45 KG/M2 | SYSTOLIC BLOOD PRESSURE: 119 MMHG | HEIGHT: 67 IN

## 2024-03-13 DIAGNOSIS — Z79.01 CHRONIC ANTICOAGULATION: Primary | ICD-10-CM

## 2024-03-13 LAB — INR PPP: 2 (ref 2–3.5)

## 2024-03-13 PROCEDURE — 3078F DIAST BP <80 MM HG: CPT | Performed by: STUDENT IN AN ORGANIZED HEALTH CARE EDUCATION/TRAINING PROGRAM

## 2024-03-13 PROCEDURE — 85610 PROTHROMBIN TIME: CPT | Performed by: STUDENT IN AN ORGANIZED HEALTH CARE EDUCATION/TRAINING PROGRAM

## 2024-03-13 PROCEDURE — 93793 ANTICOAG MGMT PT WARFARIN: CPT | Performed by: STUDENT IN AN ORGANIZED HEALTH CARE EDUCATION/TRAINING PROGRAM

## 2024-03-13 PROCEDURE — 3074F SYST BP LT 130 MM HG: CPT | Performed by: STUDENT IN AN ORGANIZED HEALTH CARE EDUCATION/TRAINING PROGRAM

## 2024-03-13 NOTE — PROGRESS NOTES
Anticoagulation Summary  As of 3/13/2024      INR goal:  2.0-3.0   TTR:  84.7% (6.1 y)   INR used for dosin.00 (3/13/2024)   Warfarin maintenance plan:  2.5 mg (5 mg x 0.5) every Tue, Thu, Sat; 5 mg (5 mg x 1) all other days   Weekly warfarin total:  27.5 mg   Plan last modified:  Amy Dyer, PharmD (11/15/2023)   Next INR check:  2024   Target end date:  Indefinite    Indications    Stroke (CMS-HCC) [I63.9] (Resolved) [I63.9]  Chronic anticoagulation [Z79.01]                 Anticoagulation Episode Summary       INR check location:  Anticoagulation Clinic    Preferred lab:      Send INR reminders to:      Comments:            Anticoagulation Care Providers       Provider Role Specialty Phone number    [facility] Responsible  [phone]    Marshal Sethi M.D. Responsible Neurology [phone]          Anticoagulation Patient Findings  Patient Findings       Negatives:  Signs/symptoms of thrombosis, Signs/symptoms of bleeding, Laboratory test error suspected, Change in health, Change in alcohol use, Change in activity, Upcoming invasive procedure, Emergency department visit, Upcoming dental procedure, Missed doses, Extra doses, Change in medications, Change in diet/appetite, Hospital admission, Bruising, Other complaints                  HPI:   Hieu Duong seen in clinic today, on anticoagulation therapy with warfarin due to history of stroke.    Patient's previous INR was therapeutic at 2.6 on 24, at which time patient was instructed to continue with current warfarin regimen.  He returns to clinic today to recheck INR to ensure it is therapeutic and thus preventing possible clotting and/or bleeding/bruising complications.    CHADS-VASc = n/a  (unadjusted ischemic stroke risk/year:  n/a)    Does patient have any changes to current medical/health status since last appt (Y/N):  NO  Does patient have any signs/symptoms of bleeding and/or thrombosis since the last appt  "(Y/N):  NO  Does patient have any interval changes to diet or medications since last appt (Y/N):  NO  Are there any complications or cost restrictions with current therapy (Y/N):  NO     Does patient have [facility] PCP? Yes, Doug Molina M.D. (If not, please document discussion that patient must be seen at [facility])       Vitals:      Vitals:    03/13/24 0903   BP: 119/74   BP Location: Right arm   Patient Position: Sitting   BP Cuff Size: Large adult   Pulse: (!) 58   Resp: 15   SpO2: 95%   Height: 1.702 m (5' 7.01\")        Asssessment:      INR therapeutic at 2.0, therefore decreasing stroke and/or bleeding risk.   Reason(s) for out of range INR today:  n/a      Pt is not on antiplatelet therapy    Medication reconciliation completed today.    Plan:  Pt is to continue with current warfarin dosing regimen.     Follow up:  Because warfarin is a high risk medication and current CHEST guidelines recommend regular monitoring intervals (few days up to 12 weeks), will have patient return to clinic in 7 weeks to recheck INR.    Dalton Taveras, PharmD, BCACP    "

## 2024-05-01 ENCOUNTER — APPOINTMENT (OUTPATIENT)
Dept: MEDICAL GROUP | Facility: PHYSICIAN GROUP | Age: 69
End: 2024-05-01
Payer: MEDICARE

## 2024-05-01 DIAGNOSIS — Z79.01 CHRONIC ANTICOAGULATION: ICD-10-CM

## 2024-05-01 LAB — INR PPP: 1.9 (ref 2–3.5)

## 2024-05-01 PROCEDURE — 85610 PROTHROMBIN TIME: CPT | Performed by: STUDENT IN AN ORGANIZED HEALTH CARE EDUCATION/TRAINING PROGRAM

## 2024-05-01 PROCEDURE — 99211 OFF/OP EST MAY X REQ PHY/QHP: CPT | Performed by: STUDENT IN AN ORGANIZED HEALTH CARE EDUCATION/TRAINING PROGRAM

## 2024-05-01 NOTE — PROGRESS NOTES
Anticoagulation Summary  As of 2024      INR goal:  2.0-3.0   TTR:  82.9% (6.2 y)   INR used for dosin.90 (2024)   Warfarin maintenance plan:  2.5 mg (5 mg x 0.5) every e, u, Sat; 5 mg (5 mg x 1) all other days   Weekly warfarin total:  27.5 mg   Plan last modified:  Amy Dyer, PharmD (11/15/2023)   Next INR check:  2024   Target end date:  Indefinite    Indications    Stroke (CMS-HCC) [I63.9] (Resolved) [I63.9]  Chronic anticoagulation [Z79.01]                 Anticoagulation Episode Summary       INR check location:  Anticoagulation Clinic    Preferred lab:      Send INR reminders to:      Comments:  He alternates between 2.5mg and 5mg every other Thursday          Anticoagulation Care Providers       Provider Role Specialty Phone number    [facility] Responsible  [phone]    Marshal Sethi M.D. Responsible Neurology [phone]          Anticoagulation Patient Findings  Patient Findings       Positives:  Bruising (scratches from his dogs)    Negatives:  Signs/symptoms of thrombosis, Signs/symptoms of bleeding, Laboratory test error suspected, Change in health, Change in alcohol use, Change in activity, Upcoming invasive procedure, Emergency department visit, Upcoming dental procedure, Missed doses, Extra doses, Change in medications, Change in diet/appetite, Hospital admission, Other complaints                  HPI:   Hieu Duong seen in clinic today, on anticoagulation therapy with warfarin due to history of CVA.    Patient's previous INR was therapeutic at 2.0 on 2024, at which time patient was instructed to continue regimen. Patient returns to clinic today to recheck INR to ensure it is therapeutic and thus preventing possible clotting and/or bleeding/bruising complications.    Does patient have [facility] PCP? Yes, Doug Molina M.D. (If not, please document discussion that patient must be seen at [facility])     Vitals:      There  were no vitals filed for this visit.     Asssessment:      INR is subtherapeutic at 1.9, therefore increasing risk of stroke/VTE  Reason(s) for out of range INR today:  unknown       Medication reconciliation completed today.    Warfarin dosing recommendation: Continue regimen as listed above. Since INR is only 0.1 out of range and pt has been historically therapeutic     Follow up:  Because warfarin is a high risk medication and current CHEST guidelines recommend regular monitoring intervals (few days up to 12 weeks), will have patient return to clinic in 6 weeks to recheck INR.    Amy Dyer, PharmD

## 2024-05-28 NOTE — THERAPY
Occupational Therapy  Daily Treatment     Patient Name: Hieu Duong  Age:  68 y.o., Sex:  male  Medical Record #: [id number]  Today's Date: 10/27/2023     Precautions  Precautions: Fall Risk, Weight Bearing As Tolerated Right Lower Extremity    Subjective    Pt very pleasant and motivated to participate in OT     Objective     10/27/23 0931   OT Charge Group   Charges Yes   OT Self Care / ADL (Units) 2   OT Total Time Spent   OT Individual Total Time Spent (Mins) 30   Precautions   Precautions Fall Risk;Weight Bearing As Tolerated Right Lower Extremity   Vitals   O2 Delivery Device None - Room Air   Pain   Intervention Declines   Cognition    Level of Consciousness Alert   ABS (Agitated Behavior Scale)   Agitated Behavior Scale Performed No   Sleep/Wake Cycle   Sleep & Rest Awake;Out of bed   Vision Screen   Vision   (Pt wears glasses)   Functional Level of Assist   Grooming Standby Assist;Standing   Grooming Description Increased time;Initial preparation for task;Standing at sink;Supervision for safety   Lower Body Dressing Standby Assist   Lower Body Dressing Description Sock aid;Increased time;Initial preparation for task;Set-up of equipment;Supervision for safety;Verbal cueing   Toileting Standby Assist   Toileting Description Grab bar;Increased time;Initial preparation for task;Set-up of equipment;Supervision for safety   Bed, Chair, Wheelchair Transfer Standby Assist   Bed Chair Wheelchair Transfer Description Increased time;Supervision for safety   Toilet Transfers Standby Assist   Toilet Transfer Description Grab bar;Increased time;Adaptive equipment;Set-up of equipment;Supervision for safety;Initial preparation for task  (BSC over toilet)   Balance   Standing Balance (Static) Fair   Standing Balance (Dynamic) Fair   Weight Shift Standing Fair   Interdisciplinary Plan of Care Collaboration   IDT Collaboration with  Certified Nursing Assistant   Patient Position at End of Therapy Seated;Chair Alarm  On;Call Light within Reach;Phone within Reach   Collaboration Comments CNA present at beginning of session as pt was getting dressed.   Strengths & Barriers   Strengths Willingly participates in therapeutic activities;Supportive family;Pleasant and cooperative;Motivated for self care and independence;Independent prior level of function;Good insight into deficits/needs;Alert and oriented;Adequate strength;Effective communication skills     Provided CGA w/ FWW for functional ambulation from pt bed > bathroom > w/c at bedside.    Assessment    Pt seen for tx addressing morning grooming routine, toileting, and LB dressing w/ AE. Pt tolerated activity well w/ decreased assist for ADLs and transfers. Pt progressing towards goals. Continue OT POC.    Strengths: Willingly participates in therapeutic activities, Supportive family, Pleasant and cooperative, Motivated for self care and independence, Independent prior level of function, Good insight into deficits/needs, Alert and oriented, Adequate strength, Effective communication skills    Plan    ADLs  Standing tolerance/balance  Functional mobility w/ FWW  Strength/Endurance    DME  OT DME Recommendations  Bathroom Equipment:  (shower chair and grab bars)    Passport items to be completed:  Perform bathroom transfers, complete dressing, complete feeding, get ready for the day, prepare a simple meal, participate in household tasks, adapt home for safety needs, demonstrate home exercise program, complete caregiver training     Occupational Therapy Goals (Active)       There are no active problems.             21.1

## 2024-06-12 ENCOUNTER — ANTICOAGULATION VISIT (OUTPATIENT)
Dept: MEDICAL GROUP | Facility: PHYSICIAN GROUP | Age: 69
End: 2024-06-12
Payer: MEDICARE

## 2024-06-12 VITALS
OXYGEN SATURATION: 97 % | BODY MASS INDEX: 21.45 KG/M2 | RESPIRATION RATE: 14 BRPM | HEIGHT: 67 IN | HEART RATE: 61 BPM | DIASTOLIC BLOOD PRESSURE: 65 MMHG | SYSTOLIC BLOOD PRESSURE: 115 MMHG

## 2024-06-12 DIAGNOSIS — Z79.01 CHRONIC ANTICOAGULATION: Primary | ICD-10-CM

## 2024-06-12 LAB — INR PPP: 2.1 (ref 2–3.5)

## 2024-06-12 PROCEDURE — 93793 ANTICOAG MGMT PT WARFARIN: CPT | Performed by: STUDENT IN AN ORGANIZED HEALTH CARE EDUCATION/TRAINING PROGRAM

## 2024-06-12 PROCEDURE — 85610 PROTHROMBIN TIME: CPT | Performed by: STUDENT IN AN ORGANIZED HEALTH CARE EDUCATION/TRAINING PROGRAM

## 2024-06-12 PROCEDURE — 3074F SYST BP LT 130 MM HG: CPT | Performed by: STUDENT IN AN ORGANIZED HEALTH CARE EDUCATION/TRAINING PROGRAM

## 2024-06-12 PROCEDURE — 3078F DIAST BP <80 MM HG: CPT | Performed by: STUDENT IN AN ORGANIZED HEALTH CARE EDUCATION/TRAINING PROGRAM

## 2024-06-12 NOTE — PROGRESS NOTES
Anticoagulation Summary  As of 2024      INR goal:  2.0-3.0   TTR:  82.3% (6.4 y)   INR used for dosin.10 (2024)   Warfarin maintenance plan:  2.5 mg (5 mg x 0.5) every Tue, Thu, Sat; 5 mg (5 mg x 1) all other days   Weekly warfarin total:  27.5 mg   Plan last modified:  Amy Dyer, PharmD (11/15/2023)   Next INR check:  2024   Target end date:  Indefinite    Indications    Stroke (CMS-HCC) [I63.9] (Resolved) [I63.9]  Chronic anticoagulation [Z79.01]                 Anticoagulation Episode Summary       INR check location:  Anticoagulation Clinic    Preferred lab:      Send INR reminders to:      Comments:  He alternates between 2.5mg and 5mg every other Thursday          Anticoagulation Care Providers       Provider Role Specialty Phone number    [facility] Responsible  [phone]    Marshal Sethi M.D. Responsible Neurology [phone]          Anticoagulation Patient Findings  Patient Findings       Negatives:  Signs/symptoms of thrombosis, Signs/symptoms of bleeding, Laboratory test error suspected, Change in health, Change in alcohol use, Change in activity, Upcoming invasive procedure, Emergency department visit, Upcoming dental procedure, Missed doses, Extra doses, Change in medications, Change in diet/appetite, Hospital admission, Bruising, Other complaints                  HPI:   Hieu Saraybeth Duong seen in clinic today, on anticoagulation therapy with warfarin due to history of stroke    Patient's previous INR was subtherapeutic at 1.9 on 24, at which time patient was instructed to continue with current warfarin regimen.  He returns to clinic today to recheck INR to ensure it is therapeutic and thus preventing possible clotting and/or bleeding/bruising complications.    CHADS-VASc = n/a  (unadjusted ischemic stroke risk/year:  n/a)    Does patient have any changes to current medical/health status since last appt (Y/N):  NO  Does patient have any  "signs/symptoms of bleeding and/or thrombosis since the last appt (Y/N):  NO  Does patient have any interval changes to diet or medications since last appt (Y/N):  No  Are there any complications or cost restrictions with current therapy (Y/N):  NO     Does patient have [facility] PCP? Yes, Doug Molina M.D. (If not, please document discussion that patient must be seen at [facility])       Vitals:      Vitals:    06/12/24 0906   BP: 115/65   BP Location: Right arm   Patient Position: Sitting   BP Cuff Size: Large adult   Pulse: 61   Resp: 14   SpO2: 97%   Height: 1.702 m (5' 7.01\")        Asssessment:      INR therapeutic at 2.1, therefore decreasing stroke and/or bleeding risk   Reason(s) for out of range INR today:  n/a      Pt is not on antiplatelet therapy    Medication reconciliation completed today.    Plan:  Pt is to continue with current warfarin dosing regimen.     Follow up:  Because warfarin is a high risk medication and current CHEST guidelines recommend regular monitoring intervals (few days up to 12 weeks), will have patient return to clinic in 7 weeks to recheck INR.    Dalton Taveras, PharmD, BCACP    "

## 2024-06-27 DIAGNOSIS — Z86.73 HISTORY OF CVA (CEREBROVASCULAR ACCIDENT): ICD-10-CM

## 2024-06-27 RX ORDER — WARFARIN SODIUM 5 MG/1
TABLET ORAL
Qty: 100 TABLET | Refills: 1 | Status: SHIPPED | OUTPATIENT
Start: 2024-06-27

## 2024-06-27 NOTE — TELEPHONE ENCOUNTER
Caller: Hieu Duong     Topic/issue: Medication was sent to the incorrect pharmacy .   Please send to :  [facility]/PHARMACY #[id number] - [place], [place] - [address] AT [place] [address] [[id number]]     Callback Number: [phone]     Thank you  Lea ALSTON

## 2024-07-02 DIAGNOSIS — Z86.73 HISTORY OF CVA (CEREBROVASCULAR ACCIDENT): ICD-10-CM

## 2024-07-02 RX ORDER — ATORVASTATIN CALCIUM 20 MG/1
20 TABLET, FILM COATED ORAL
Qty: 30 TABLET | Refills: 2 | Status: SHIPPED | OUTPATIENT
Start: 2024-07-02

## 2024-07-31 ENCOUNTER — ANTICOAGULATION VISIT (OUTPATIENT)
Dept: MEDICAL GROUP | Facility: PHYSICIAN GROUP | Age: 69
End: 2024-07-31
Payer: MEDICARE

## 2024-07-31 VITALS
DIASTOLIC BLOOD PRESSURE: 76 MMHG | HEART RATE: 58 BPM | BODY MASS INDEX: 21.45 KG/M2 | WEIGHT: 137 LBS | SYSTOLIC BLOOD PRESSURE: 124 MMHG

## 2024-07-31 DIAGNOSIS — Z79.01 CHRONIC ANTICOAGULATION: ICD-10-CM

## 2024-07-31 LAB — INR PPP: 2.9 (ref 2–3.5)

## 2024-07-31 ASSESSMENT — FIBROSIS 4 INDEX: FIB4 SCORE: 1.55

## 2024-08-28 DIAGNOSIS — G47.09 OTHER INSOMNIA: ICD-10-CM

## 2024-08-28 RX ORDER — ESCITALOPRAM OXALATE 20 MG/1
20 TABLET ORAL EVERY MORNING
Qty: 90 TABLET | Refills: 1 | Status: SHIPPED | OUTPATIENT
Start: 2024-08-28

## 2024-09-03 DIAGNOSIS — G47.09 OTHER INSOMNIA: ICD-10-CM

## 2024-09-04 RX ORDER — ESCITALOPRAM OXALATE 20 MG/1
20 TABLET ORAL EVERY MORNING
Qty: 90 TABLET | Refills: 1 | Status: SHIPPED | OUTPATIENT
Start: 2024-09-04

## 2024-09-04 NOTE — TELEPHONE ENCOUNTER
Received request via: Patient    Was the patient seen in the last year in this department? Yes    Does the patient have an active prescription (recently filled or refills available) for medication(s) requested? No    [facility]    Does the patient have [facility] Plus and need 100-day supply? (This applies to ALL medications) Patient does not have SCP

## 2024-09-10 NOTE — TELEPHONE ENCOUNTER
Received request via: Patient    Was the patient seen in the last year in this department? Yes    Does the patient have an active prescription (recently filled or refills available) for medication(s) requested? No    [facility]    Does the patient have correction Plus and need 100-day supply? (This applies to ALL medications) Patient does not have SCP

## 2024-09-11 ENCOUNTER — ANTICOAGULATION VISIT (OUTPATIENT)
Dept: MEDICAL GROUP | Facility: PHYSICIAN GROUP | Age: 69
End: 2024-09-11
Payer: MEDICARE

## 2024-09-11 DIAGNOSIS — Z79.01 CHRONIC ANTICOAGULATION: ICD-10-CM

## 2024-09-11 LAB — INR PPP: 2.4 (ref 2–3.5)

## 2024-09-11 PROCEDURE — 85610 PROTHROMBIN TIME: CPT | Performed by: FAMILY MEDICINE

## 2024-09-11 PROCEDURE — 93793 ANTICOAG MGMT PT WARFARIN: CPT | Performed by: FAMILY MEDICINE

## 2024-09-11 RX ORDER — OMEPRAZOLE 40 MG/1
40 CAPSULE, DELAYED RELEASE ORAL
Qty: 100 CAPSULE | Refills: 3 | Status: SHIPPED | OUTPATIENT
Start: 2024-09-11

## 2024-09-11 NOTE — PROGRESS NOTES
Anticoagulation Summary  As of 2024      INR goal:  2.0-3.0   TTR:  83.0% (6.6 y)   INR used for dosin.40 (2024)   Warfarin maintenance plan:  2.5 mg (5 mg x 0.5) every e, Thu, Sat; 5 mg (5 mg x 1) all other days   Weekly warfarin total:  27.5 mg   Plan last modified:  Amy Dyer, PharmD (11/15/2023)   Next INR check:  10/23/2024   Target end date:  Indefinite    Indications    Stroke (CMS-HCC) [I63.9] (Resolved) [I63.9]  Chronic anticoagulation [Z79.01]                 Anticoagulation Episode Summary       INR check location:  Anticoagulation Clinic    Preferred lab:  --    Send INR reminders to:  --    Comments:  He alternates between 2.5mg and 5mg every other Thursday          Anticoagulation Care Providers       Provider Role Specialty Phone number    [facility] Responsible  [phone]    Marshal Sethi M.D. Responsible Neurology [phone]                  Refer to Patient Findings for HPI:  Patient Findings       Positives:  Missed doses (pt missed half tab dose, then took 1.5 tabs the next day)    Negatives:  Signs/symptoms of thrombosis, Signs/symptoms of bleeding, Laboratory test error suspected, Change in health, Change in alcohol use, Change in activity, Upcoming invasive procedure, Emergency department visit, Upcoming dental procedure, Extra doses, Change in medications, Change in diet/appetite, Hospital admission, Bruising, Other complaints            There were no vitals filed for this visit.  Pt declined vitals    Verified current warfarin dosing schedule. Pt did have a missed dose, but took extra the next day to compensate for that. Discussed the risks of doubling up on doses.     Medications reconciled: Yes  Pt is not on antiplatelet therapy.      A/P   INR is therapeutic  Reason(s) for out of range INR today: N/A      Warfarin dosing recommendation: Continue regimen as listed above.    Pt educated to contact our clinic with any changes in medications or  s/s of bleeding or thrombosis. Pt is aware to seek immediate medical attention for falls, head injury or deep cuts.    Follow up:  Because warfarin is a high risk medication and current CHEST guidelines recommend regular monitoring intervals (few days up to 12 weeks), requested pt to return in 6 week(s). Pt agrees.    Jennifer Soliz, PharmD

## 2024-09-27 DIAGNOSIS — Z86.73 HISTORY OF CVA (CEREBROVASCULAR ACCIDENT): ICD-10-CM

## 2024-09-30 RX ORDER — ATORVASTATIN CALCIUM 20 MG/1
20 TABLET, FILM COATED ORAL
Qty: 90 TABLET | Refills: 3 | Status: SHIPPED | OUTPATIENT
Start: 2024-09-30

## 2024-10-23 ENCOUNTER — ANTICOAGULATION VISIT (OUTPATIENT)
Dept: MEDICAL GROUP | Facility: PHYSICIAN GROUP | Age: 69
End: 2024-10-23
Payer: MEDICARE

## 2024-10-23 VITALS — WEIGHT: 137 LBS | RESPIRATION RATE: 14 BRPM | BODY MASS INDEX: 21.5 KG/M2 | HEIGHT: 67 IN

## 2024-10-23 DIAGNOSIS — Z86.73 HISTORY OF CVA (CEREBROVASCULAR ACCIDENT): ICD-10-CM

## 2024-10-23 DIAGNOSIS — Z79.01 CHRONIC ANTICOAGULATION: ICD-10-CM

## 2024-10-23 LAB — INR PPP: 3 (ref 2–3.5)

## 2024-10-23 PROCEDURE — 93793 ANTICOAG MGMT PT WARFARIN: CPT | Performed by: STUDENT IN AN ORGANIZED HEALTH CARE EDUCATION/TRAINING PROGRAM

## 2024-10-23 PROCEDURE — 85610 PROTHROMBIN TIME: CPT | Performed by: STUDENT IN AN ORGANIZED HEALTH CARE EDUCATION/TRAINING PROGRAM

## 2024-10-23 RX ORDER — WARFARIN SODIUM 5 MG/1
TABLET ORAL
Qty: 100 TABLET | Refills: 1 | Status: SHIPPED | OUTPATIENT
Start: 2024-10-23

## 2024-10-23 ASSESSMENT — FIBROSIS 4 INDEX: FIB4 SCORE: 1.55

## 2024-12-04 ENCOUNTER — ANTICOAGULATION VISIT (OUTPATIENT)
Dept: MEDICAL GROUP | Facility: PHYSICIAN GROUP | Age: 69
End: 2024-12-04
Payer: MEDICARE

## 2024-12-04 VITALS
DIASTOLIC BLOOD PRESSURE: 69 MMHG | HEART RATE: 59 BPM | RESPIRATION RATE: 14 BRPM | BODY MASS INDEX: 20.36 KG/M2 | WEIGHT: 130 LBS | SYSTOLIC BLOOD PRESSURE: 120 MMHG

## 2024-12-04 DIAGNOSIS — Z79.01 CHRONIC ANTICOAGULATION: ICD-10-CM

## 2024-12-04 LAB — INR PPP: 2.8 (ref 2–3.5)

## 2024-12-04 PROCEDURE — 3078F DIAST BP <80 MM HG: CPT | Performed by: NURSE PRACTITIONER

## 2024-12-04 PROCEDURE — 93793 ANTICOAG MGMT PT WARFARIN: CPT | Performed by: NURSE PRACTITIONER

## 2024-12-04 PROCEDURE — 3074F SYST BP LT 130 MM HG: CPT | Performed by: NURSE PRACTITIONER

## 2024-12-04 PROCEDURE — 85610 PROTHROMBIN TIME: CPT | Performed by: PHYSICIAN ASSISTANT

## 2024-12-04 ASSESSMENT — FIBROSIS 4 INDEX: FIB4 SCORE: 1.61

## 2024-12-04 NOTE — PROGRESS NOTES
Anticoagulation Summary  As of 2024      INR goal:  2.0-3.0   TTR:  83.5% (6.8 y)   INR used for dosin.80 (2024)   Warfarin maintenance plan:  2.5 mg (5 mg x 0.5) every e, Thu, Sat; 5 mg (5 mg x 1) all other days   Weekly warfarin total:  27.5 mg   Plan last modified:  Amy Dyer, PharmD (11/15/2023)   Next INR check:  1/15/2025   Target end date:  Indefinite    Indications    Stroke (CMS-HCC) [I63.9] (Resolved) [I63.9]  Chronic anticoagulation [Z79.01]                 Anticoagulation Episode Summary       INR check location:  Anticoagulation Clinic    Preferred lab:  --    Send INR reminders to:  --    Comments:  He alternates between 2.5mg and 5mg every other Thursday          Anticoagulation Care Providers       Provider Role Specialty Phone number    [facility] Responsible  [phone]    Marshal Sethi M.D. Responsible Neurology [phone]                  Refer to Patient Findings for HPI:  Patient Findings       Negatives:  Signs/symptoms of thrombosis, Signs/symptoms of bleeding, Laboratory test error suspected, Change in health, Change in alcohol use, Change in activity, Upcoming invasive procedure, Emergency department visit, Upcoming dental procedure, Missed doses, Extra doses, Change in medications, Change in diet/appetite, Hospital admission, Bruising, Other complaints            Vitals:    24 0824   BP: 120/69   Pulse: (!) 59   Resp: 14   Weight: 59 kg (130 lb)       Verified current warfarin dosing schedule.    Medications reconciled.  Pt is not on antiplatelet therapy.      A/P   INR is therapeutic  Reason(s) for out of range INR today: N/A      Warfarin dosing recommendation: Continue regimen as listed above.    Pt educated to contact our clinic with any changes in medications or s/s of bleeding or thrombosis. Pt is aware to seek immediate medical attention for falls, head injury or deep cuts.    Request pt to return in 6 week(s). Pt  agrees.    Sis Horne, PharmD

## 2024-12-09 ENCOUNTER — OFFICE VISIT (OUTPATIENT)
Dept: MEDICAL GROUP | Facility: PHYSICIAN GROUP | Age: 69
End: 2024-12-09
Payer: MEDICARE

## 2024-12-09 VITALS
BODY MASS INDEX: 20.88 KG/M2 | RESPIRATION RATE: 14 BRPM | TEMPERATURE: 98.3 F | SYSTOLIC BLOOD PRESSURE: 118 MMHG | OXYGEN SATURATION: 97 % | HEIGHT: 67 IN | WEIGHT: 133 LBS | HEART RATE: 72 BPM | DIASTOLIC BLOOD PRESSURE: 70 MMHG

## 2024-12-09 DIAGNOSIS — E10.9 TYPE 1 DIABETES MELLITUS WITHOUT COMPLICATION (HCC): ICD-10-CM

## 2024-12-09 DIAGNOSIS — K92.1 BLOOD IN STOOL: ICD-10-CM

## 2024-12-09 PROCEDURE — 3078F DIAST BP <80 MM HG: CPT | Performed by: FAMILY MEDICINE

## 2024-12-09 PROCEDURE — 99214 OFFICE O/P EST MOD 30 MIN: CPT | Performed by: FAMILY MEDICINE

## 2024-12-09 PROCEDURE — 3074F SYST BP LT 130 MM HG: CPT | Performed by: FAMILY MEDICINE

## 2024-12-09 RX ORDER — INSULIN DEGLUDEC 200 U/ML
12 INJECTION, SOLUTION SUBCUTANEOUS DAILY
COMMUNITY
Start: 2024-11-21

## 2024-12-09 RX ORDER — OMEPRAZOLE 40 MG/1
40 CAPSULE, DELAYED RELEASE ORAL
Qty: 100 CAPSULE | Refills: 3 | Status: SHIPPED | OUTPATIENT
Start: 2024-12-09

## 2024-12-09 RX ORDER — ALENDRONATE SODIUM 70 MG/1
70 TABLET ORAL
COMMUNITY
Start: 2024-12-02

## 2024-12-09 ASSESSMENT — ENCOUNTER SYMPTOMS
DIZZINESS: 0
EYES NEGATIVE: 1
CHILLS: 0
HEADACHES: 0
MYALGIAS: 0
RESPIRATORY NEGATIVE: 1
CARDIOVASCULAR NEGATIVE: 1
MUSCULOSKELETAL NEGATIVE: 1
PSYCHIATRIC NEGATIVE: 1
DEPRESSION: 0
HEARTBURN: 0
PALPITATIONS: 0
BRUISES/BLEEDS EASILY: 0
NAUSEA: 0
TINGLING: 0
BLURRED VISION: 0
FEVER: 0
DOUBLE VISION: 0
CONSTITUTIONAL NEGATIVE: 1
COUGH: 0
NEUROLOGICAL NEGATIVE: 1
HEMOPTYSIS: 0
BLOOD IN STOOL: 1

## 2024-12-09 ASSESSMENT — FIBROSIS 4 INDEX: FIB4 SCORE: 1.61

## 2024-12-09 NOTE — PROGRESS NOTES
Subjective     Hieu Duong is a 69 y.o. male who presents with Stool Color Change (Blood in stool for 2wks )            1. Blood in stool  Noticed blood in stool, no gross bleeding  Is on therapeutic coumadin level for afib  On exam small external non bleeding hemorrhoid present  Will get cbc and send to gi for eval   CBC WITHOUT DIFFERENTIAL; Future   Comp Metabolic Panel; Future   Referral to Gastroenterology    2. Type 1 diabetes mellitus without complication (HCC)  Currently treated for DM, taking meds and checking bs at home, trying to do DM diet.controlled        Past Medical History:  No date: Diabetes (HCC)  No date: GI bleed      Comment:  after taking blood thinner  No date: Hyperlipidemia  No date: Hypertension  No date: Stroke (HCC)  No date: Vision loss      Comment:  peripheral right eye due to cva  No past surgical history on file.  Social History    Tobacco Use      Smoking status: Never      Smokeless tobacco: Never    Alcohol use: No    Drug use: No    Review of patient's family history indicates:  Problem: Diabetes      Relation: Brother          Age of Onset: (Not Specified)      Current Outpatient Medications: ·  omeprazole (PRILOSEC) 40 MG delayed-release capsule, Take 1 Capsule by mouth every morning before breakfast. EFORE BREAKFAST, Disp: 100 Capsule, Rfl: 3·  alendronate (FOSAMAX) 70 MG Tab, Take 70 mg by mouth., Disp: , Rfl: ·  TRESIBA FLEXTOUCH 200 UNIT/ML Solution Pen-injector, Inject 12 Units under the skin every day., Disp: , Rfl: ·  warfarin (COUMADIN) 5 MG Tab, Take one-half to one tablet by mouth daily or as directed by anticoagulation clinic, Disp: 100 Tablet, Rfl: 1·  atorvastatin (LIPITOR) 20 MG Tab, TAKE 1 TABLET BY MOUTH EVERY DAY, Disp: 90 Tablet, Rfl: 3·  enalapril (VASOTEC) 2.5 MG Tab, Take 1 Tablet by mouth every day., Disp: 100 Tablet, Rfl: 3·  sildenafil citrate (VIAGRA) 100 MG tablet, Take 100 mg by mouth 1 time a day as needed for Erectile Dysfunction., Disp: ,  Rfl: ·  Insulin Lispro-aabc, 1 U Dial, (LYUMJEV KWIKPEN) 100 UNIT/ML Solution Pen-injector, Inject 3-4 Units under the skin 3 times a day before meals., Disp: , Rfl: ·  melatonin 3 MG Tab, Take 2 Tablets by mouth at bedtime., Disp: 60 Tablet, Rfl: 2·  Glucagon (GVOKE PFS) 1 MG/0.2ML Solution Prefilled Syringe, Inject 1 Each under the skin., Disp: , Rfl: ·  escitalopram (LEXAPRO) 20 MG tablet, Take 1 Tablet by mouth every morning. (Patient not taking: Reported on 12/9/2024), Disp: 90 Tablet, Rfl: 1·  diclofenac sodium (VOLTAREN) 1 % Gel, Apply 2 g topically. (Patient not taking: Reported on 12/9/2024), Disp: , Rfl: ·  ferrous gluconate (FERGON) 324 (38 Fe) MG Tab, Take 324 mg by mouth every day. (Patient not taking: Reported on 12/11/2023), Disp: , Rfl: ·  PREVIDENT 5000 BOOSTER PLUS 1.1 % Paste, Apply 1 Application to teeth. (Patient not taking: Reported on 12/9/2024), Disp: , Rfl: ·  oxyCODONE immediate-release (ROXICODONE) 5 MG Tab, Take 5 mg by mouth. (Patient not taking: Reported on 12/9/2024), Disp: , Rfl:     Patient was instructed on the use of medications, either prescriptions or OTC and informed on when the appropriate follow up time period should be. In addition, patient was also instructed that should any acute worsening occur that they should notify this clinic asap or call 911.              Review of Systems   Constitutional: Negative.  Negative for chills and fever.   HENT: Negative.  Negative for hearing loss.    Eyes: Negative.  Negative for blurred vision and double vision.   Respiratory: Negative.  Negative for cough and hemoptysis.    Cardiovascular: Negative.  Negative for chest pain and palpitations.   Gastrointestinal:  Positive for blood in stool. Negative for heartburn and nausea.   Genitourinary: Negative.  Negative for dysuria.   Musculoskeletal: Negative.  Negative for myalgias.   Skin: Negative.  Negative for rash.   Neurological: Negative.  Negative for dizziness, tingling and headaches.  "  Endo/Heme/Allergies: Negative.  Does not bruise/bleed easily.   Psychiatric/Behavioral: Negative.  Negative for depression and suicidal ideas.    All other systems reviewed and are negative.             Objective     /70   Pulse 72   Temp 36.8 °C (98.3 °F) (Temporal)   Resp 14   Ht 1.702 m (5' 7\")   Wt 60.3 kg (133 lb)   SpO2 97%   BMI 20.83 kg/m²      Physical Exam  Vitals and nursing note reviewed.   Constitutional:       General: He is not in acute distress.     Appearance: He is well-developed. He is not diaphoretic.   HENT:      Head: Normocephalic and atraumatic.      Mouth/Throat:      Pharynx: No oropharyngeal exudate.   Eyes:      Pupils: Pupils are equal, round, and reactive to light.   Cardiovascular:      Rate and Rhythm: Normal rate and regular rhythm.      Heart sounds: Normal heart sounds. No murmur heard.     No friction rub. No gallop.   Pulmonary:      Effort: Pulmonary effort is normal. No respiratory distress.      Breath sounds: Normal breath sounds. No wheezing or rales.   Chest:      Chest wall: No tenderness.   Genitourinary:     Rectum: External hemorrhoid present.   Neurological:      Mental Status: He is alert and oriented to person, place, and time.   Psychiatric:         Behavior: Behavior normal.         Thought Content: Thought content normal.         Judgment: Judgment normal.                             Assessment & Plan        Assessment & Plan  Blood in stool    Orders:    CBC WITHOUT DIFFERENTIAL; Future    Comp Metabolic Panel; Future    Referral to Gastroenterology    Type 1 diabetes mellitus without complication (HCC)                       "

## 2024-12-19 DIAGNOSIS — E10.9 TYPE 1 DIABETES MELLITUS WITHOUT COMPLICATION (HCC): ICD-10-CM

## 2024-12-19 RX ORDER — ENALAPRIL MALEATE 2.5 MG/1
2.5 TABLET ORAL DAILY
Qty: 90 TABLET | Refills: 4 | Status: SHIPPED | OUTPATIENT
Start: 2024-12-19

## 2025-01-10 ENCOUNTER — PATIENT MESSAGE (OUTPATIENT)
Dept: HEALTH INFORMATION MANAGEMENT | Facility: OTHER | Age: 70
End: 2025-01-10

## 2025-01-15 ENCOUNTER — ANTICOAGULATION VISIT (OUTPATIENT)
Dept: MEDICAL GROUP | Facility: PHYSICIAN GROUP | Age: 70
End: 2025-01-15
Payer: MEDICARE

## 2025-01-15 VITALS
DIASTOLIC BLOOD PRESSURE: 97 MMHG | BODY MASS INDEX: 20.88 KG/M2 | SYSTOLIC BLOOD PRESSURE: 118 MMHG | WEIGHT: 133 LBS | HEIGHT: 67 IN | HEART RATE: 58 BPM

## 2025-01-15 DIAGNOSIS — Z79.01 CHRONIC ANTICOAGULATION: ICD-10-CM

## 2025-01-15 LAB — INR PPP: 2.3 (ref 2–3.5)

## 2025-01-15 PROCEDURE — 85610 PROTHROMBIN TIME: CPT | Performed by: STUDENT IN AN ORGANIZED HEALTH CARE EDUCATION/TRAINING PROGRAM

## 2025-01-15 PROCEDURE — 93793 ANTICOAG MGMT PT WARFARIN: CPT | Performed by: STUDENT IN AN ORGANIZED HEALTH CARE EDUCATION/TRAINING PROGRAM

## 2025-01-15 ASSESSMENT — FIBROSIS 4 INDEX: FIB4 SCORE: 1.61

## 2025-01-15 NOTE — PROGRESS NOTES
"Anticoagulation Summary  As of 1/15/2025      INR goal:  2.0-3.0   TTR:  83.8% (7 y)   INR used for dosin.30 (1/15/2025)   Warfarin maintenance plan:  2.5 mg (5 mg x 0.5) every e, Thu, Sat; 5 mg (5 mg x 1) all other days   Weekly warfarin total:  27.5 mg   Plan last modified:  Amy Dyer, PharmD (11/15/2023)   Next INR check:  3/12/2025   Target end date:  Indefinite    Indications    Stroke (CMS-HCC) [I63.9] (Resolved) [I63.9]  Chronic anticoagulation [Z79.01]                 Anticoagulation Episode Summary       INR check location:  Anticoagulation Clinic    Preferred lab:  --    Send INR reminders to:  --    Comments:  He alternates between 2.5mg and 5mg every other Thursday          Anticoagulation Care Providers       Provider Role Specialty Phone number    [facility] Responsible  [phone]    Marshal Sethi M.D. Responsible Neurology [phone]                  Refer to Patient Findings for HPI:  Patient Findings       Negatives:  Signs/symptoms of thrombosis, Signs/symptoms of bleeding, Laboratory test error suspected, Change in health, Change in alcohol use, Change in activity, Upcoming invasive procedure, Emergency department visit, Upcoming dental procedure, Missed doses, Extra doses, Change in medications, Change in diet/appetite, Hospital admission, Bruising, Other complaints            Vitals:    01/15/25 0930   BP: (!) 118/97   Pulse: (!) 58   Weight: 60.3 kg (133 lb)   Height: 1.702 m (5' 7\")       Verified current warfarin dosing schedule.    Medications reconciled.  Pt is not on antiplatelet therapy.      A/P   INR is therapeutic  Reason(s) for out of range INR today: N/A      Warfarin dosing recommendation: Continue regimen as listed above.    Pt educated to contact our clinic with any changes in medications or s/s of bleeding or thrombosis. Pt is aware to seek immediate medical attention for falls, head injury or deep cuts.    Request pt to return in 8 week(s). " Pt agrees.    Mary SinghD

## 2025-02-20 DIAGNOSIS — E10.9 TYPE 1 DIABETES MELLITUS WITHOUT COMPLICATION (HCC): ICD-10-CM

## 2025-02-20 RX ORDER — SILDENAFIL 100 MG/1
100 TABLET, FILM COATED ORAL
Qty: 10 TABLET | Refills: 3 | Status: SHIPPED | OUTPATIENT
Start: 2025-02-20

## 2025-02-20 NOTE — TELEPHONE ENCOUNTER
Received request via: Patient    Was the patient seen in the last year in this department? Yes    Does the patient have an active prescription (recently filled or refills available) for medication(s) requested? No    Pharmacy Name: Pharmacy:   [facility]/Pharmacy #[id number] - [facility] [address]     Does the patient have [facility]P Plus and need 100-day supply? (This applies to ALL medications) Patient does not have SCP

## 2025-03-12 ENCOUNTER — ANTICOAGULATION VISIT (OUTPATIENT)
Dept: MEDICAL GROUP | Facility: PHYSICIAN GROUP | Age: 70
End: 2025-03-12
Payer: MEDICARE

## 2025-03-12 VITALS — BODY MASS INDEX: 20.36 KG/M2 | WEIGHT: 130 LBS

## 2025-03-12 DIAGNOSIS — Z79.01 CHRONIC ANTICOAGULATION: ICD-10-CM

## 2025-03-12 LAB — INR PPP: 2.6 (ref 2–3.5)

## 2025-03-12 PROCEDURE — 99211 OFF/OP EST MAY X REQ PHY/QHP: CPT | Performed by: STUDENT IN AN ORGANIZED HEALTH CARE EDUCATION/TRAINING PROGRAM

## 2025-03-12 PROCEDURE — 85610 PROTHROMBIN TIME: CPT | Performed by: STUDENT IN AN ORGANIZED HEALTH CARE EDUCATION/TRAINING PROGRAM

## 2025-03-12 ASSESSMENT — FIBROSIS 4 INDEX: FIB4 SCORE: 1.61

## 2025-03-12 NOTE — PROGRESS NOTES
"Anticoagulation Summary  As of 3/12/2025      INR goal:  2.0-3.0   TTR:  84.2% (7.1 y)   INR used for dosin.60 (3/12/2025)   Warfarin maintenance plan:  2.5 mg (5 mg x 0.5) every Tue, Thu, Sat; 5 mg (5 mg x 1) all other days   Weekly warfarin total:  27.5 mg   Plan last modified:  Amy Dyer, PharmD (11/15/2023)   Next INR check:  2025   Target end date:  Indefinite    Indications    Stroke (CMS-HCC) [I63.9] (Resolved) [I63.9]  Chronic anticoagulation [Z79.01]                 Anticoagulation Episode Summary       INR check location:  Anticoagulation Clinic    Preferred lab:  --    Send INR reminders to:  --    Comments:  He alternates between 2.5mg and 5mg every other Thursday          Anticoagulation Care Providers       Provider Role Specialty Phone number    [facility] Responsible  [phone]    Marshal Sethi M.D. Responsible Neurology [phone]                  [id number] Stroke Risk Points: N/A   Values used to calculate this score:    Points  Metrics       0        Has Congestive Heart Failure: No       1        Has Hypertension: Yes       1        Age: 69       1        Has Diabetes: Yes       2        Had Stroke: No                 Had TIA: No                 Had Thromboembolism: Yes       1        Has Vascular Disease: No       0        Clinically Relevant Sex: Male     No data recorded     No results found for: \"CHOLSTRLTOT\", \"LDL\", \"HDL\", \"TRIGLYCERIDE\"    Lab Results   Component Value Date/Time    SODIUM 140 2024 09:43 AM    SODIUM 137 10/30/2023 05:44 AM    POTASSIUM 4.6 2024 09:43 AM    POTASSIUM 4.1 10/30/2023 05:44 AM    CHLORIDE 102 2024 09:43 AM    CHLORIDE 103 10/30/2023 05:44 AM    CO2 25 2024 09:43 AM    CO2 28 10/30/2023 05:44 AM    GLUCOSE 128 (H) 2024 09:43 AM    GLUCOSE 151 (H) 10/30/2023 05:44 AM    BUN 28 (H) 2024 09:43 AM    BUN 21 10/30/2023 05:44 AM    CREATININE 0.74 (L) 2024 09:43 AM    CREATININE 0.62 " 10/30/2023 05:44 AM    BUNCREATRAT 38 (H) 11/26/2024 09:43 AM    GLOMRATE 129 10/24/2023 08:05 AM     Lab Results   Component Value Date/Time    ALKPHOSPHAT 57 11/26/2024 09:43 AM    ALKPHOSPHAT 74 10/26/2023 05:59 AM    ASTSGOT 40 11/26/2024 09:43 AM    ASTSGOT 31 10/26/2023 05:59 AM    ALTSGPT 42 11/26/2024 09:43 AM    ALTSGPT 27 10/26/2023 05:59 AM    TBILIRUBIN 0.6 11/26/2024 09:43 AM    TBILIRUBIN 1.6 (H) 10/26/2023 05:59 AM          Current Outpatient Medications:     sildenafil citrate, 100 mg, Oral, QDAY PRN    enalapril, 2.5 mg, Oral, DAILY    omeprazole, 40 mg, Oral, QAM AC    alendronate, Take 70 mg by mouth.    Tresiba FlexTouch, 12 Units, Subcutaneous, DAILY    warfarin, Take one-half to one tablet by mouth daily or as directed by anticoagulation clinic    atorvastatin, 20 mg, Oral, QDAY    diclofenac sodium, Apply 2 g topically. (Patient not taking: Reported on 12/9/2024)    ferrous gluconate, 324 mg, Oral, DAILY (Patient not taking: Reported on 12/11/2023)    PreviDent 5000 Booster Plus, Apply 1 Application to teeth. (Patient not taking: Reported on 12/9/2024)    Lyumjev KwikPen, 3-4 Units, Subcutaneous, TID AC    oxyCODONE immediate-release, Take 5 mg by mouth. (Patient not taking: Reported on 12/9/2024)    melatonin, 6 mg, Oral, QHS    Gvoke PFS, Inject 1 Each under the skin.    Refer to Patient Findings for HPI:  Patient Findings       Positives:  Upcoming invasive procedure (Colonoscopy TBD)    Negatives:  Signs/symptoms of thrombosis, Signs/symptoms of bleeding, Laboratory test error suspected, Change in health, Change in alcohol use, Change in activity, Emergency department visit, Upcoming dental procedure, Missed doses, Extra doses, Change in medications, Change in diet/appetite, Hospital admission, Bruising, Other complaints            Vitals:    03/12/25 0909   Weight: 59 kg (130 lb)     Pt declined vitals    Verified current warfarin dosing schedule.    Medications reconciled: Yes  Pt is not  on antiplatelet therapy.  Is patient on NSAID?: No       A/P   INR is therapeutic  Reason(s) for out of range INR today: N/A      Warfarin dosing recommendation: Continue regimen as listed above.     Pt educated to contact our clinic with any changes in medications or s/s of bleeding or thrombosis. Pt is aware to seek immediate medical attention for falls, head injury or deep cuts.    Pt to have procedure on TBD. Due to pt history lovenox bridging is NOT indicated.   Pt to follow instructions as below, after procedure to ask operating MD when safe to resume anticoagulation, if no instructions given, pt will follow as below    CHADSvasc 6 (with stroke, thromboembolic > 3 months ago) - moderate risk      Lab Results   Component Value Date/Time    BUN 28 (H) 11/26/2024 09:43 AM    BUN 21 10/30/2023 05:44 AM    CREATININE 0.74 (L) 11/26/2024 09:43 AM    CREATININE 0.62 10/30/2023 05:44 AM    GLOMRATE 129 10/24/2023 08:05 AM           Date  Warfarin dosing   5 Days before procedure TBD 0mg   4 Days before procedure TBD 0mg   3 Days before procedure TBD 0mg   2 Days before procedure TBD 0mg   1 Days before procedure TBD 0mg   PROCEDURE TBD 5 mg   1 Day after procedure TBD 5 mg   2 Days after procedure TBD 2.5 mg   3 Days after procedure TBD 5 mg   4 Days after procedure TBD 2.5 mg   5 Days after procedure TBD 5 mg  Get INR checked   Clearance faxed to [facility]     Jennifer Soliz, PharmD    Request pt to return in 10 week(s). Pt agrees.    Jennifer Soliz, MaryD

## 2025-03-21 ENCOUNTER — TELEPHONE (OUTPATIENT)
Dept: VASCULAR LAB | Facility: MEDICAL CENTER | Age: 70
End: 2025-03-21
Payer: MEDICARE

## 2025-03-21 NOTE — TELEPHONE ENCOUNTER
Called and s/w pt as pt has colonoscopy scheduled late march and pt needs to be scheduled for closer INR f/u. Scheduled pt for 4/2/25 appointment at 11:00am.    Bull Mcdonald PharmD  Authorized Nuclear Pharmacist (ANP)

## 2025-04-02 ENCOUNTER — ANTICOAGULATION VISIT (OUTPATIENT)
Dept: MEDICAL GROUP | Facility: PHYSICIAN GROUP | Age: 70
End: 2025-04-02
Payer: MEDICARE

## 2025-04-02 VITALS
BODY MASS INDEX: 20.4 KG/M2 | DIASTOLIC BLOOD PRESSURE: 68 MMHG | HEIGHT: 67 IN | SYSTOLIC BLOOD PRESSURE: 100 MMHG | HEART RATE: 62 BPM | WEIGHT: 130 LBS

## 2025-04-02 DIAGNOSIS — Z79.01 CHRONIC ANTICOAGULATION: ICD-10-CM

## 2025-04-02 LAB — INR PPP: 1.6 (ref 2–3.5)

## 2025-04-02 PROCEDURE — 3074F SYST BP LT 130 MM HG: CPT | Performed by: STUDENT IN AN ORGANIZED HEALTH CARE EDUCATION/TRAINING PROGRAM

## 2025-04-02 PROCEDURE — 85610 PROTHROMBIN TIME: CPT | Performed by: STUDENT IN AN ORGANIZED HEALTH CARE EDUCATION/TRAINING PROGRAM

## 2025-04-02 PROCEDURE — 3078F DIAST BP <80 MM HG: CPT | Performed by: STUDENT IN AN ORGANIZED HEALTH CARE EDUCATION/TRAINING PROGRAM

## 2025-04-02 PROCEDURE — 99211 OFF/OP EST MAY X REQ PHY/QHP: CPT | Performed by: STUDENT IN AN ORGANIZED HEALTH CARE EDUCATION/TRAINING PROGRAM

## 2025-04-02 ASSESSMENT — FIBROSIS 4 INDEX: FIB4 SCORE: 1.61

## 2025-04-02 NOTE — PROGRESS NOTES
"Anticoagulation Summary  As of 2025      INR goal:  2.0-3.0   TTR:  84.0% (7.2 y)   INR used for dosin.60 (2025)   Warfarin maintenance plan:  2.5 mg (5 mg x 0.5) every Tue, Thu, Sat; 5 mg (5 mg x 1) all other days   Weekly warfarin total:  27.5 mg   Plan last modified:  Amy Dyer, PharmD (11/15/2023)   Next INR check:  2025   Target end date:  Indefinite    Indications    Stroke (CMS-HCC) [I63.9] (Resolved) [I63.9]  Chronic anticoagulation [Z79.01]                 Anticoagulation Episode Summary       INR check location:  Anticoagulation Clinic    Preferred lab:  --    Send INR reminders to:  --    Comments:  He alternates between 2.5mg and 5mg every other Thursday          Anticoagulation Care Providers       Provider Role Specialty Phone number    [facility] Responsible  [phone]    Marshal Sethi M.D. Responsible Neurology [phone]                Refer to Patient Findings for HPI:  Patient Findings       Positives:  Missed doses (held 5 days for colonoscopy on 3/26/25)    Negatives:  Signs/symptoms of thrombosis, Signs/symptoms of bleeding, Laboratory test error suspected, Change in health, Change in alcohol use, Change in activity, Upcoming invasive procedure, Emergency department visit, Upcoming dental procedure, Extra doses, Change in medications, Change in diet/appetite, Hospital admission, Bruising, Other complaints            Date Referral Placed: 25      Vitals:  Vitals:    25 1052   BP: 100/68   Pulse: 62   Weight: 59 kg (130 lb)   Height: 1.702 m (5' 7\")       Verified current warfarin dosing schedule.    Medications reconciled.  Pt is not on antiplatelet therapy.      A/P   INR is subtherapeutic  Reason(s) for out of range INR today: Missed dose(s)      Warfarin dosing recommendation: Increase to Warfarin 7.5 mg and then resume previouse warfarin regimen.     Pt educated to contact our clinic with any changes in medications or s/s of bleeding " or thrombosis. Pt is aware to seek immediate medical attention for falls, head injury or deep cuts.    Request pt to return in 1 week(s). Pt agrees.    Sis Horne, MaryD

## 2025-04-08 PROBLEM — Z79.4 LONG TERM (CURRENT) USE OF INSULIN (HCC): Status: ACTIVE | Noted: 2025-04-08

## 2025-04-08 NOTE — ASSESSMENT & PLAN NOTE
Chronic, stable. Last A1c 6.5 as of 2025. Last monofilament foot exam: 2024, last urine microalb/creat: 2023, last retinal screenin2025. Maintains on insulin therapy. He plans on getting an insulin pump Continue to advise low carbohydrate diet with regular physical activity. Continue current treatment regime. Follow up with endocrinology (Dr. Sharyn Olivia) as scheduled for continued monitoring and management.

## 2025-04-08 NOTE — ASSESSMENT & PLAN NOTE
Chronic, stable. Pt monitor BP at home periodically stating he runs within normal, though he tends to run low normal. Denies dizziness/lightheadedness, chest pain, dyspnea. Continue current treatment regime: enalapril 2.5mg daily. Follow up with PCP annually for continued monitoring and management.

## 2025-04-08 NOTE — ASSESSMENT & PLAN NOTE
Chronic, stable. Maintains on statin therapy without issue. No recent labs for review. Continue atorvastatin 20mg daily. Pt admits to consuming red meat often. Recommend Mediterranean diet and regular physical activity. Follow up with PCP at least annually for continued monitoring and management.

## 2025-04-08 NOTE — ASSESSMENT & PLAN NOTE
Chronic, stable. Pt states depression was secondary to caring for his ailing wife and her subsequent passing in 2023. He states he had felt more anxious than depressed. Pt has a hx of SSRI use during this period. PHQ today 0/2. He maintains off pharmacotherapy presently and does well though he states he has good days and bad days. He continues to stay social. Follow up with PCP at least annually for continued monitoring and management.

## 2025-04-09 ENCOUNTER — TELEMEDICINE (OUTPATIENT)
Dept: FAMILY PLANNING/WOMEN'S HEALTH CLINIC | Facility: PHYSICIAN GROUP | Age: 70
End: 2025-04-09
Payer: MEDICARE

## 2025-04-09 ENCOUNTER — ANTICOAGULATION VISIT (OUTPATIENT)
Dept: MEDICAL GROUP | Facility: PHYSICIAN GROUP | Age: 70
End: 2025-04-09
Payer: MEDICARE

## 2025-04-09 DIAGNOSIS — I10 ESSENTIAL HYPERTENSION: ICD-10-CM

## 2025-04-09 DIAGNOSIS — N40.0 BENIGN PROSTATIC HYPERPLASIA WITHOUT LOWER URINARY TRACT SYMPTOMS: ICD-10-CM

## 2025-04-09 DIAGNOSIS — F32.A DEPRESSION, UNSPECIFIED DEPRESSION TYPE: ICD-10-CM

## 2025-04-09 DIAGNOSIS — M81.8 OTHER OSTEOPOROSIS WITHOUT CURRENT PATHOLOGICAL FRACTURE: ICD-10-CM

## 2025-04-09 DIAGNOSIS — Z79.4 LONG TERM (CURRENT) USE OF INSULIN (HCC): ICD-10-CM

## 2025-04-09 DIAGNOSIS — Z79.01 CHRONIC ANTICOAGULATION: ICD-10-CM

## 2025-04-09 DIAGNOSIS — E78.2 MIXED HYPERLIPIDEMIA: ICD-10-CM

## 2025-04-09 DIAGNOSIS — E10.9 TYPE 1 DIABETES MELLITUS WITHOUT COMPLICATION (HCC): ICD-10-CM

## 2025-04-09 DIAGNOSIS — Z86.73 HISTORY OF CVA (CEREBROVASCULAR ACCIDENT): ICD-10-CM

## 2025-04-09 PROBLEM — M81.0 AGE-RELATED OSTEOPOROSIS WITHOUT CURRENT PATHOLOGICAL FRACTURE: Status: ACTIVE | Noted: 2025-04-09

## 2025-04-09 LAB — INR PPP: 1.5 (ref 2–3.5)

## 2025-04-09 PROCEDURE — 85610 PROTHROMBIN TIME: CPT | Performed by: NURSE PRACTITIONER

## 2025-04-09 PROCEDURE — G0439 PPPS, SUBSEQ VISIT: HCPCS | Mod: 95 | Performed by: PHYSICIAN ASSISTANT

## 2025-04-09 PROCEDURE — 99211 OFF/OP EST MAY X REQ PHY/QHP: CPT | Performed by: NURSE PRACTITIONER

## 2025-04-09 SDOH — ECONOMIC STABILITY: FOOD INSECURITY: WITHIN THE PAST 12 MONTHS, THE FOOD YOU BOUGHT JUST DIDN'T LAST AND YOU DIDN'T HAVE MONEY TO GET MORE.: NEVER TRUE

## 2025-04-09 SDOH — ECONOMIC STABILITY: TRANSPORTATION INSECURITY: IN THE PAST 12 MONTHS, HAS LACK OF TRANSPORTATION KEPT YOU FROM MEDICAL APPOINTMENTS OR FROM GETTING MEDICATIONS?: NO

## 2025-04-09 SDOH — ECONOMIC STABILITY: FOOD INSECURITY: WITHIN THE PAST 12 MONTHS, YOU WORRIED THAT YOUR FOOD WOULD RUN OUT BEFORE YOU GOT THE MONEY TO BUY MORE.: NEVER TRUE

## 2025-04-09 SDOH — ECONOMIC STABILITY: FOOD INSECURITY: HOW HARD IS IT FOR YOU TO PAY FOR THE VERY BASICS LIKE FOOD, HOUSING, MEDICAL CARE, AND HEATING?: NOT HARD AT ALL

## 2025-04-09 ASSESSMENT — ACTIVITIES OF DAILY LIVING (ADL)
LACK_OF_TRANSPORTATION: NO
BATHING_REQUIRES_ASSISTANCE: 0

## 2025-04-09 ASSESSMENT — PATIENT HEALTH QUESTIONNAIRE - PHQ9: CLINICAL INTERPRETATION OF PHQ2 SCORE: 0

## 2025-04-09 ASSESSMENT — ENCOUNTER SYMPTOMS: GENERAL WELL-BEING: GOOD

## 2025-04-09 NOTE — ASSESSMENT & PLAN NOTE
Pt reports a history of 7 strokes without etiology ever being determined. He maintained on a cardiac monitor for 18 months which did not demonstrate AF. Cardiology continued to advise warfarin to help prevent recurrent CVA. Follow up with anti-coagulation clinic per routine.

## 2025-04-09 NOTE — PROGRESS NOTES
Virtual Visit: Comprehensive Health Assessment Program     This visit was conducted via Teams using secure and encrypted videoconferencing technology.   The patient was in their home in the state of [place].    The patient's identity was confirmed and verbal consent was obtained for this virtual visit.        Hieu Duong is a 69 y.o. presenting for his comprehensive health assessment.    Patient Active Problem List    Diagnosis Date Noted    Other osteoporosis without current pathological fracture 04/09/2025    Long term (current) use of insulin ([facility]) 04/08/2025    Thrombocytosis 10/30/2023    Closed right hip fracture, initial encounter ([facility]) 10/25/2023    Azotemia 10/25/2023    Anemia 10/25/2023    Depression 10/25/2023    Hypophosphatemia 10/25/2023    Type 1 diabetes mellitus without complication ([facility]) 07/12/2021    Late effect of stroke 04/12/2021    Benign prostatic hyperplasia without lower urinary tract symptoms 01/04/2021    Chronic anticoagulation 01/04/2021    Essential hypertension 01/04/2021    Mixed hyperlipidemia 01/04/2021    Other insomnia 01/04/2021    IDDM (insulin dependent diabetes mellitus) 06/19/2019    History of CVA (cerebrovascular accident) 06/19/2019    History of GI bleed 06/19/2019    Vision loss        Current Outpatient Medications   Medication Sig Dispense Refill    sildenafil citrate (VIAGRA) 100 MG tablet Take 1 Tablet by mouth 1 time a day as needed for Erectile Dysfunction. 10 Tablet 3    enalapril (VASOTEC) 2.5 MG Tab TAKE 1 TABLET BY MOUTH EVERY DAY 90 Tablet 4    omeprazole (PRILOSEC) 40 MG delayed-release capsule Take 1 Capsule by mouth every morning before breakfast. EFORE BREAKFAST 100 Capsule 3    alendronate (FOSAMAX) 70 MG Tab Take 70 mg by mouth.      TRESIBA FLEXTOUCH 200 UNIT/ML Solution Pen-injector Inject 12 Units under the skin every day.      warfarin (COUMADIN) 5 MG Tab Take one-half to one tablet by mouth daily or as directed by anticoagulation  clinic 100 Tablet 1    atorvastatin (LIPITOR) 20 MG Tab TAKE 1 TABLET BY MOUTH EVERY DAY 90 Tablet 3    ferrous gluconate (FERGON) 324 (38 Fe) MG Tab Take 324 mg by mouth every day.      Insulin Lispro-aabc, 1 U Dial, (OPALDIOGENESMARCIN HENSLEYDYLLANVALERIE) 100 UNIT/ML Solution Pen-injector Inject 3-4 Units under the skin 3 times a day before meals.      diclofenac sodium (VOLTAREN) 1 % Gel Apply 2 g topically.      PREVIDENT 5000 BOOSTER PLUS 1.1 % Paste Apply 1 Application to teeth.      Glucagon (GVOKE PFS) 1 MG/0.2ML Solution Prefilled Syringe Inject 1 Each under the skin.       No current facility-administered medications for this visit.          Current supplements as per medication list.     Allergies:   Tetanus toxoids and Tdap [dipth, acell pertus, tetanus]  Social History     Tobacco Use    Smoking status: Never    Smokeless tobacco: Never   Vaping Use    Vaping status: Never Used   Substance Use Topics    Alcohol use: Yes     Alcohol/week: 0.6 oz     Types: 1 Standard drinks or equivalent per week     Comment: one drink per day    Drug use: No     Family History   Problem Relation Age of Onset    Diabetes Brother      Hieu  has a past medical history of Diabetes (HCC), GI bleed, Hyperlipidemia, Hypertension, Stroke (HCC), and Vision loss.   History reviewed. No pertinent surgical history.    Screening:  In the last six months have you experienced any leakage of urine? Yes, mild, urgency more secondary to ignoring cues    Depression Screening  Little interest or pleasure in doing things?  0 - not at all  Feeling down, depressed , or hopeless? 0 - not at all  Patient Health Questionnaire Score: 0     If depressive symptoms identified deferred to follow up visit unless specifically addressed in assessment and plan.    Interpretation of PHQ-9 Total Score   Score Severity   1-4 No Depression   5-9 Mild Depression   10-14 Moderate Depression   15-19 Moderately Severe Depression   20-27 Severe Depression    Screening for Cognitive  Impairment  Do you or any of your friends or family members have any concern about your memory? No  Three Minute Recall (Village, Kitchen, Baby)  /3    Richard clock face with all 12 numbers and set the hands to show 10 minutes past 11.       Cognitive concerns identified deferred for follow up unless specifically addressed in assessment and plan.    Fall Risk Assessment  Has the patient had two or more falls in the last year or any fall with injury in the last year?  No    Safety Assessment  Do you always wear your seatbelt?  Yes  Any changes to home needed to function safely? No  Difficulty hearing.  No  Patient counseled about all safety risks that were identified.    Functional Assessment ADLs  Are there any barriers preventing you from cooking for yourself or meeting nutritional needs?  No.    Are there any barriers preventing you from driving safely or obtaining transportation?  No.    Are there any barriers preventing you from using a telephone or calling for help?  No    Are there any barriers preventing you from shopping?  No.    Are there any barriers preventing you from taking care of your own finances?  No    Are there any barriers preventing you from managing your medications?  No    Are there any barriers preventing you from showering, bathing or dressing yourself? No    Are there any barriers preventing you from doing housework or laundry? No  Are there any barriers preventing you from using the toilet?No  Are you currently engaging in any exercise or physical activity?  Yes. Walking     Self-Assessment of Health  What is your perception of your health? Good    Do you sleep more than six hours a night? Yes    In the past 7 days, how much did pain keep you from doing your normal work? None    Do you spend quality time with family or friends (virtually or in person)? Yes    Do you usually eat a heart healthy diet that constists of a variety of fruits, vegetables, whole grains and fiber? Yes    Do you eat  foods high in fat and/or Fast Food more than three times per week? No    How concerned are you that your medical conditions are not being well managed? Not at all    Are you worried that in the next 2 months, you may not have stable housing that you own, rent, or stay in as part of a household? No      Advance Care Planning  Do you have an Advance Directive, Living Will, Durable Power of , or POLST? Yes    Living Will     is not on file - instructed patient to bring in a copy to scan into their chart      Health Maintenance Summary            Current Care Gaps       Fasting Lipid Profile (Yearly) Overdue since 1/13/2023 01/13/2022  Outside Procedure: LIPID PROFILE    05/13/2021  Outside Procedure: LIPID PROFILE              Diabetes: Urine Protein Screening (Yearly) Overdue since 1/19/2024 01/19/2023  MICROALBUMIN CREAT RATIO URINE    01/13/2022  MICROALBUMIN CREAT RATIO URINE    07/07/2021  Done    05/13/2021  MICROALBUMIN CREAT RATIO URINE              Diabetes: Monofilament / LE Exam (Yearly) Overdue since 2/21/2025 02/21/2024  Done    07/07/2021  Done                      Needs Review       A1c Screening (Every 6 Months) Tentatively due on 9/19/2025 03/19/2025  POCT A1C    12/17/2024  POCT A1C    09/18/2024  POCT A1C    06/18/2024  POCT A1C    12/22/2023  HEMOGLOBIN A1C     Only the first 5 history entries have been loaded, but more history exists.                    Upcoming       COVID-19 Vaccine (6 - 2024-25 season) Postponed until 12/9/2025 12/13/2023  Imm Admin: COVID-19, mRNA, LNP-S, PF, marium-sucrose, 30 mcg/0.3 mL    09/27/2022  Imm Admin: MODERNA BIVALENT BOOSTER SARS-COV-2 VACCINE (6+)    04/16/2022  Imm Admin: MODERNA SARS-COV-2 VACCINE (12+)    11/03/2021  Imm Admin: MODERNA SARS-COV-2 VACCINE (12+)    04/01/2021  Imm Admin: MODERNA SARS-COV-2 VACCINE (12+)      Only the first 5 history entries have been loaded, but more history exists.              SERUM CREATININE  (Yearly) Next due on 12/9/2025 12/09/2024  Comp Metabolic Panel    03/05/2024  COMP METABOLIC PANEL    03/05/2024  COMP METABOLIC PANEL    12/22/2023  COMP METABOLIC PANEL    12/22/2023  COMP METABOLIC PANEL      Only the first 5 history entries have been loaded, but more history exists.              Diabetes: Retinopathy Screening (Yearly) Next due on 2/3/2026      02/03/2025  RETINAL SCREENING RESULTS    01/10/2024  Done    11/12/2020  Done              Annual Wellness Visit (Yearly) Next due on 4/9/2026 04/09/2025  Level of Service: CO INITIAL ANNUAL WELLNESS VISIT-INCLUDES PPPS    02/21/2024  Visit Dx: Medicare annual wellness visit, subsequent    07/06/2021  Done              Colorectal Cancer Screening (Colonoscopy - Every 10 Years) Next due on 1/6/2031 01/06/2021  Colonoscopy (Done)    07/07/2010  Colonoscopy (Done)                      Completed or No Longer Recommended       Influenza Vaccine (Series Information) Completed      09/06/2024  Imm Admin: Influenza high-dose trivalent (PF)    10/09/2023  Imm Admin: Influenza Vaccine, Quadrivalent, Adjuvanted (Pf)    10/19/2022  Imm Admin: Influenza Vaccine Adult HD    09/22/2021  Imm Admin: Influenza Vaccine Adult HD    09/23/2020  Imm Admin: Influenza Vaccine Adult HD      Only the first 5 history entries have been loaded, but more history exists.              Zoster (Shingles) Vaccines (Series Information) Completed      12/31/2020  Imm Admin: Zoster Vaccine Recombinant (RZV) (SHINGRIX)    09/08/2020  Imm Admin: Zoster Vaccine Recombinant (RZV) (SHINGRIX)    05/13/2013  Imm Admin: Zoster Vaccine Live (ZVL) (Zostavax) - HISTORICAL DATA              Pneumococcal Vaccine: 50+ Years (Series Information) Completed      02/21/2024  Imm Admin: Pneumococcal Conjugate Vaccine (PCV20)    12/19/2018  Imm Admin: Pneumococcal polysaccharide vaccine (PPSV-23)    12/07/2015  Imm Admin: Pneumococcal Conjugate Vaccine (Prevnar/PCV-13)    06/28/2012  Imm Admin:  Pneumococcal polysaccharide vaccine (PPSV-23)              Hepatitis A Vaccine (Hep A) (Series Information) Aged Out      No completion history exists for this topic.              HPV Vaccines (Series Information) Aged Out     No completion history exists for this topic.              Polio Vaccine (Inactivated Polio) (Series Information) Aged Out     No completion history exists for this topic.              Meningococcal Immunization (Series Information) Aged Out     No completion history exists for this topic.              Hepatitis B Vaccine (Hep B)  Discontinued     No completion history exists for this topic.              IMM DTaP/Tdap/Td Vaccine  Discontinued     No completion history exists for this topic.              Hepatitis C Screening  Discontinued     No completion history exists for this topic.                            Patient Care Team:  Doug Molina M.D. as PCP - General (Family Medicine)      Financial Resource Strain: Low Risk  (4/9/2025)    Overall Financial Resource Strain (CARDIA)     Difficulty of Paying Living Expenses: Not hard at all      Transportation Needs: No Transportation Needs (4/9/2025)    PRAPARE - Transportation     Lack of Transportation (Medical): No     Lack of Transportation (Non-Medical): No      Food Insecurity: No Food Insecurity (4/9/2025)    Hunger Vital Sign     Worried About Running Out of Food in the Last Year: Never true     Ran Out of Food in the Last Year: Never true              Alert, oriented in no acute distress.  Eye contact is good, speech goal directed, affect calm.    Assessment and Plan. The following treatment and monitoring plan is recommended:  Depression  Chronic, stable. Pt states depression was secondary to caring for his ailing wife and her subsequent passing in 2023. He states he had felt more anxious than depressed. Pt has a hx of SSRI use during this period. PHQ today 0/2. He maintains off pharmacotherapy presently and does well though he  states he has good days and bad days. He continues to stay social. Follow up with PCP at least annually for continued monitoring and management.    Essential hypertension  Chronic, stable. Pt monitor BP at home periodically stating he runs within normal, though he tends to run low normal. Denies dizziness/lightheadedness, chest pain, dyspnea. Continue current treatment regime: enalapril 2.5mg daily. Follow up with PCP annually for continued monitoring and management.     Mixed hyperlipidemia  Chronic, stable. Maintains on statin therapy without issue. No recent labs for review. Continue atorvastatin 20mg daily. Pt admits to consuming red meat often. Recommend Mediterranean diet and regular physical activity. Follow up with PCP at least annually for continued monitoring and management.     Type 1 diabetes mellitus without complication (HCC)  Long term (current) use of insulin (HCC)  Chronic, stable. Last A1c 6.5 as of 2025. Last monofilament foot exam: 2024, last urine microalb/creat: 2023, last retinal screenin2025. Maintains on insulin therapy. He plans on getting an insulin pump Continue to advise low carbohydrate diet with regular physical activity. Continue current treatment regime. Follow up with endocrinology (Dr. Sharyn Olivia) as scheduled for continued monitoring and management.    Other osteoporosis without current pathological fracture  Chronic, ongoing. Pt maintains on alendronate 70mg weekly after suffering a hip fracture following a fall in . Encouraged weightbearing exercises as tolerated. Discussed fall risk reduction methods. Follow up with endocrinology at least annually for continued monitoring and management.    DEXA   The femoral neck bone mineral density measures 0.605 g/cm2. This represents a   T-score of  -2.4.   The total femoral bone mineral density measures 0.810 g/cm2. This represents a   T-score of  -1.5.     History of CVA (cerebrovascular accident)  Pt reports a history  of 7 strokes without etiology ever being determined. He maintained on a cardiac monitor for 18 months which did not demonstrate AF. Cardiology continued to advise warfarin to help prevent recurrent CVA. Follow up with anti-coagulation clinic per routine.     Benign prostatic hyperplasia without lower urinary tract symptoms  Chronic, ongoing. Pt endorses nocturia 3-4x/night as well as urinary urgency. He is reluctant to take any further medications to address this. He is interested in urological evaluation. Follow up with PCP.    Services suggested: No services needed at this time  Health Care Screening: Age-appropriate preventive services recommended by USPTF and ACIP covered by Medicare were discussed today. Services ordered if indicated and agreed upon by the patient.  Referrals offered: Community-based lifestyle interventions to reduce health risks and promote self-management and wellness, fall prevention, nutrition, physical activity, tobacco-use cessation, weight loss, and mental health services as per orders if indicated.    Discussion today about general wellness and lifestyle habits:    Prevent falls and reduce trip hazards; Cautioned about securing or removing rugs.  Have a working fire alarm and carbon monoxide detector.  Engage in regular physical activity and social activities.    Follow-up: No follow-ups on file.

## 2025-04-09 NOTE — PROGRESS NOTES
Kellie Pleitez is a 57 year old female presenting for a consultation regarding Pre op.  Patient was referred by Kayla You MD.  Patient's primary care provider is Allison Banda MD.  Patient's  Conner is present for appointment.     Medications were reviewed and updated.  Allergies reviewed.  Latex allergy: no  Smoking history reviewed.            OP Anticoagulation Service Note    Date: 2025    Anticoagulation Summary  As of 2025      INR goal:  2.0-3.0   TTR:  83.7% (7.2 y)   INR used for dosin.50 (2025)   Warfarin maintenance plan:  2.5 mg (5 mg x 0.5) every Tue, Thu, Sat; 5 mg (5 mg x 1) all other days   Weekly warfarin total:  27.5 mg   Plan last modified:  Amy Dyer, PharmD (11/15/2023)   Next INR check:  2025   Target end date:  Indefinite    Indications    Stroke (CMS-HCC) [I63.9] (Resolved) [I63.9]  Chronic anticoagulation [Z79.01]                 Anticoagulation Episode Summary       INR check location:  Anticoagulation Clinic    Preferred lab:  --    Send INR reminders to:  --    Comments:  He alternates between 2.5mg and 5mg every other Thursday          Anticoagulation Care Providers       Provider Role Specialty Phone number    [facility] Responsible  [phone]    Marshal Sethi M.D. Responsible Neurology [phone]          Anticoagulation Patient Findings  Patient Findings       Positives:  Missed doses (Possibly took 2.5 mg Friday and  of previous week, unclear.)    Negatives:  Signs/symptoms of thrombosis, Signs/symptoms of bleeding, Laboratory test error suspected, Change in health, Change in alcohol use, Change in activity, Upcoming invasive procedure, Emergency department visit, Upcoming dental procedure, Extra doses, Change in medications, Change in diet/appetite, Hospital admission, Bruising, Other complaints            HPI:   Hieu ABBEY Diallolanie is in the Anticoagulation Clinic today for an INR check on their anticoagulation therapy.     The reason for today's visit is to prevent morbidity and mortality from a blood clot and/or stroke and to reduce the risk of bleeding while on an anticoagulant.     PCP:  Doug Molina M.D.  [address] [id number] [phone]    3 vitals included with today's appt-unless patient declined:  (BP, HR, weight, ht, RR)    There were no vitals filed for this visit.    Verified current warfarin dosing schedule.    Medications reconciled: No  Pt is not on antiplatelet therapy    Assessment:   INR is subtherapeutic    Plan:  Bolus TODAY with 7.5 mg and TOMORROW with 5 mg, then resume current warfarin dosing regimen.     Follow up:  Follow up appointment in 2 week(s). Pt agrees.      Other info:  Pt educated to contact our clinic with any changes in medications or s/s of bleeding or thrombosis.  Education was provided today regarding tips to reduce their bleed risk and dietary constraints while on an anticoagulant.    National Recommendations:  The CHEST guidelines recommends frequent INR monitoring at regular intervals (a few days up to a max of 12 weeks) to ensure patients are on the proper dose of warfarin, and patients are not having any complications from therapy.  INRs can dramatically change over a short time period due to diet, medications, and medical conditions.     Bull Mcdonald, PharmD, ANP  [facility]  Phone: [phone], Fax: [phone]

## 2025-04-09 NOTE — ASSESSMENT & PLAN NOTE
Chronic, ongoing. Pt maintains on alendronate 70mg weekly after suffering a hip fracture following a fall in 2023. Encouraged weightbearing exercises as tolerated. Discussed fall risk reduction methods. Follow up with endocrinology at least annually for continued monitoring and management.    DEXA 2024  The femoral neck bone mineral density measures 0.605 g/cm2. This represents a   T-score of  -2.4.   The total femoral bone mineral density measures 0.810 g/cm2. This represents a   T-score of  -1.5.

## 2025-04-09 NOTE — ASSESSMENT & PLAN NOTE
Chronic, ongoing. Pt endorses nocturia 3-4x/night as well as urinary urgency. He is reluctant to take any further medications to address this. He is interested in urological evaluation. Follow up with PCP.

## 2025-04-09 NOTE — LETTER
[facility]  Doug Molina M.D.  [address]  [facility] [place] [address]  Fax: [phone]   Authorization for Release/Disclosure of   Protected Health Information   Name: AMINAH DUONG : 1955 SSN: [id number]   Address: [address] [place] [address] [place] [address] Phone:    [phone] (home)    I authorize the entity listed below to release/disclose the PHI below to:   [facility]/Doug Molina M.D. and Yelena Tolbert P.A.-C.   Provider or Entity Name:     Address   City, State, Zip   Phone:      Fax:     Reason for request: continuity of care   Information to be released:    [  ] LAST COLONOSCOPY,  including any PATH REPORT and follow-up  [  ] LAST FIT/COLOGUARD RESULT [  ] LAST DEXA  [  ] LAST MAMMOGRAM  [  ] LAST PAP  [  ] LAST LABS [  ] RETINA EXAM REPORT  [  ] IMMUNIZATION RECORDS  [  ] Release all info      [  ] Check here and initial the line next to each item to release ALL health information INCLUDING  _____ Care and treatment for drug and / or alcohol abuse  _____ HIV testing, infection status, or AIDS  _____ Genetic Testing    DATES OF SERVICE OR TIME PERIOD TO BE DISCLOSED: _____________  I understand and acknowledge that:  * This Authorization may be revoked at any time by you in writing, except if your health information has already been used or disclosed.  * Your health information that will be used or disclosed as a result of you signing this authorization could be re-disclosed by the recipient. If this occurs, your re-disclosed health information may no longer be protected by State or Federal laws.  * You may refuse to sign this Authorization. Your refusal will not affect your ability to obtain treatment.  * This Authorization becomes effective upon signing and will  on (date) __________.      If no date is indicated, this Authorization will  one (1) year from the signature date.    Name: Aminah Duong  Signature: Date:   2025     PLEASE FAX REQUESTED  RECORDS BACK TO: [phone]

## 2025-04-23 ENCOUNTER — ANTICOAGULATION VISIT (OUTPATIENT)
Dept: MEDICAL GROUP | Facility: PHYSICIAN GROUP | Age: 70
End: 2025-04-23
Payer: MEDICARE

## 2025-04-23 DIAGNOSIS — Z79.01 CHRONIC ANTICOAGULATION: ICD-10-CM

## 2025-04-23 LAB — INR PPP: 2 (ref 2–3.5)

## 2025-04-23 PROCEDURE — 93793 ANTICOAG MGMT PT WARFARIN: CPT | Performed by: FAMILY MEDICINE

## 2025-04-23 PROCEDURE — 85610 PROTHROMBIN TIME: CPT

## 2025-04-23 NOTE — PROGRESS NOTES
"Anticoagulation Summary  As of 2025      INR goal:  2.0-3.0   TTR:  83.3% (7.2 y)   INR used for dosin.00 (2025)   Warfarin maintenance plan:  2.5 mg (5 mg x 0.5) every Tue, Thu, Sat; 5 mg (5 mg x 1) all other days   Weekly warfarin total:  27.5 mg   Plan last modified:  Amy Dyer, PharmD (11/15/2023)   Next INR check:  2025   Target end date:  Indefinite    Indications    Stroke (CMS-HCC) [I63.9] (Resolved) [I63.9]  Chronic anticoagulation [Z79.01]                 Anticoagulation Episode Summary       INR check location:  Anticoagulation Clinic    Preferred lab:  --    Send INR reminders to:  --    Comments:  He alternates between 2.5mg and 5mg every other Thursday          Anticoagulation Care Providers       Provider Role Specialty Phone number    [facility] Responsible  [phone]    Marshal Sethi M.D. Responsible Neurology [phone]                  [id number] Stroke Risk Points: N/A   Values used to calculate this score:    Points  Metrics       0        Has Congestive Heart Failure: No       1        Has Hypertension: Yes       1        Age: 70       1        Has Diabetes: Yes       2        Had Stroke: No                 Had TIA: No                 Had Thromboembolism: Yes       1        Has Vascular Disease: No       0        Clinically Relevant Sex: Male     No data recorded     No results found for: \"CHOLSTRLTOT\", \"LDL\", \"HDL\", \"TRIGLYCERIDE\"    Lab Results   Component Value Date/Time    SODIUM 140 2024 09:43 AM    SODIUM 137 10/30/2023 05:44 AM    POTASSIUM 4.6 2024 09:43 AM    POTASSIUM 4.1 10/30/2023 05:44 AM    CHLORIDE 102 2024 09:43 AM    CHLORIDE 103 10/30/2023 05:44 AM    CO2 25 2024 09:43 AM    CO2 28 10/30/2023 05:44 AM    GLUCOSE 128 (H) 2024 09:43 AM    GLUCOSE 151 (H) 10/30/2023 05:44 AM    BUN 28 (H) 2024 09:43 AM    BUN 21 10/30/2023 05:44 AM    CREATININE 0.74 (L) 2024 09:43 AM    CREATININE 0.62 " 10/30/2023 05:44 AM    BUNCREATRAT 38 (H) 11/26/2024 09:43 AM    GLOMRATE 129 10/24/2023 08:05 AM     Lab Results   Component Value Date/Time    ALKPHOSPHAT 57 11/26/2024 09:43 AM    ALKPHOSPHAT 74 10/26/2023 05:59 AM    ASTSGOT 40 11/26/2024 09:43 AM    ASTSGOT 31 10/26/2023 05:59 AM    ALTSGPT 42 11/26/2024 09:43 AM    ALTSGPT 27 10/26/2023 05:59 AM    TBILIRUBIN 0.6 11/26/2024 09:43 AM    TBILIRUBIN 1.6 (H) 10/26/2023 05:59 AM          Current Outpatient Medications:     sildenafil citrate, 100 mg, Oral, QDAY PRN    enalapril, 2.5 mg, Oral, DAILY    omeprazole, 40 mg, Oral, QAM AC    alendronate, Take 70 mg by mouth.    Tresiba FlexTouch, 12 Units, Subcutaneous, DAILY    warfarin, Take one-half to one tablet by mouth daily or as directed by anticoagulation clinic    atorvastatin, 20 mg, Oral, QDAY    diclofenac sodium, Apply 2 g topically.    ferrous gluconate, 324 mg, Oral, DAILY    PreviDent 5000 Booster Plus, Apply 1 Application to teeth.    Lyumjev KwikPen, 3-4 Units, Subcutaneous, TID AC    Gvoke PFS, Inject 1 Each under the skin.    Refer to Patient Findings for HPI:  Patient Findings       Negatives:  Signs/symptoms of thrombosis, Signs/symptoms of bleeding, Laboratory test error suspected, Change in health, Change in alcohol use, Change in activity, Upcoming invasive procedure, Emergency department visit, Upcoming dental procedure, Missed doses, Extra doses, Change in medications, Change in diet/appetite, Hospital admission, Bruising, Other complaints            There were no vitals filed for this visit.  Pt declined vitals    Verified current warfarin dosing schedule.    Medications reconciled: Yes  Pt is not on antiplatelet therapy.  Is patient on NSAID?: No       A/P   INR is therapeutic  Reason(s) for out of range INR today: N/A      Warfarin dosing recommendation: Continue regimen as listed above. Patient will take 5 mg tablet tomorrow. He is uncertain if he took 5 mg or 2.5 mg last Thursday, but as  his INR is on the low side of his range, will plan for the higher dose tomorrow.     Pt educated to contact our clinic with any changes in medications or s/s of bleeding or thrombosis. Pt is aware to seek immediate medical attention for falls, head injury or deep cuts.    Request pt to return in 4 week(s). Pt agrees. Patient had already scheduled appointment in 4 weeks.     Jennifer Soliz, PharmD

## 2025-05-21 ENCOUNTER — ANTICOAGULATION VISIT (OUTPATIENT)
Dept: MEDICAL GROUP | Facility: PHYSICIAN GROUP | Age: 70
End: 2025-05-21
Payer: MEDICARE

## 2025-05-21 VITALS — BODY MASS INDEX: 20.4 KG/M2 | WEIGHT: 130 LBS | HEIGHT: 67 IN | RESPIRATION RATE: 14 BRPM

## 2025-05-21 DIAGNOSIS — Z79.01 CHRONIC ANTICOAGULATION: Primary | ICD-10-CM

## 2025-05-21 LAB — INR PPP: 1.8 (ref 2–3.5)

## 2025-05-21 PROCEDURE — 85610 PROTHROMBIN TIME: CPT | Performed by: PHYSICIAN ASSISTANT

## 2025-05-21 PROCEDURE — 99211 OFF/OP EST MAY X REQ PHY/QHP: CPT | Performed by: PHYSICIAN ASSISTANT

## 2025-05-21 ASSESSMENT — FIBROSIS 4 INDEX: FIB4 SCORE: 1.63

## 2025-05-21 NOTE — PROGRESS NOTES
Anticoagulation Summary  As of 2025      INR goal:  2.0-3.0   TTR:  82.4% (7.3 y)   INR used for dosin.80 (2025)   Warfarin maintenance plan:  2.5 mg (5 mg x 0.5) every Tue, Thu, Sat; 5 mg (5 mg x 1) all other days   Weekly warfarin total:  27.5 mg   Plan last modified:  Amy Dyer, PharmD (11/15/2023)   Next INR check:  2025   Target end date:  Indefinite    Indications    Stroke (CMS-HCC) [I63.9] (Resolved) [I63.9]  Chronic anticoagulation [Z79.01]                 Anticoagulation Episode Summary       INR check location:  Anticoagulation Clinic    Preferred lab:  --    Send INR reminders to:  --    Comments:  He alternates between 2.5mg and 5mg every other Thursday          Anticoagulation Care Providers       Provider Role Specialty Phone number    [facility] Responsible  [phone]    Marshal Sethi M.D. Responsible Neurology [phone]          Anticoagulation Patient Findings  Patient Findings       Positives:  Missed doses, Extra doses    Negatives:  Signs/symptoms of thrombosis, Signs/symptoms of bleeding, Laboratory test error suspected, Change in health, Change in alcohol use, Change in activity, Upcoming invasive procedure, Emergency department visit, Upcoming dental procedure, Change in medications, Change in diet/appetite, Hospital admission, Bruising, Other complaints                  HPI:   Hieu POTTER Yoellanie seen in clinic today, on anticoagulation therapy with warfarin due to history of stroke    Patient's previous INR was therapeutic at 2.0 on 25, at which time patient was instructed to continue with current warfarin regimen.  He returns to clinic today to recheck INR to ensure it is therapeutic and thus preventing possible clotting and/or bleeding/bruising complications.    CHADS-VASc = n/a  (unadjusted ischemic stroke risk/year:  n/a)    Does patient have any changes to current medical/health status since last appt (Y/N):  NO  Does patient have any  "signs/symptoms of bleeding and/or thrombosis since the last appt (Y/N):  NO  Does patient have any interval changes to diet or medications since last appt (Y/N):  took extra 1/2 tab in error one day last week, then held dose the next day.  Are there any complications or cost restrictions with current therapy (Y/N):  NO     Does patient have [facility] PCP? Yes, Doug Molina M.D. (If not, please document discussion that patient must be seen at [facility])       Vitals:      Vitals:    05/21/25 0820   Resp: 14   Weight: 59 kg (130 lb)   Height: 1.702 m (5' 7.01\")        Asssessment:      INR is subtherapeutic  Reason(s) for out of range INR today: Missed dose(s)      Pt is not on antiplatelet therapy    Medication reconciliation completed today.    Plan:  Pt is to bolus with 7.5mg X 1, then continue with current warfarin dosing regimen.     Follow up:  Because warfarin is a high risk medication and current CHEST guidelines recommend regular monitoring intervals (few days up to 12 weeks), will have patient return to clinic in 2 weeks to recheck INR.    Dalton Taveras, PharmD, BCACP      "

## 2025-06-04 ENCOUNTER — ANTICOAGULATION VISIT (OUTPATIENT)
Dept: MEDICAL GROUP | Facility: PHYSICIAN GROUP | Age: 70
End: 2025-06-04
Payer: MEDICARE

## 2025-06-04 VITALS — RESPIRATION RATE: 14 BRPM | WEIGHT: 130 LBS | BODY MASS INDEX: 20.4 KG/M2 | HEIGHT: 67 IN

## 2025-06-04 DIAGNOSIS — Z79.01 CHRONIC ANTICOAGULATION: Primary | ICD-10-CM

## 2025-06-04 LAB — INR PPP: 1.8 (ref 2–3.5)

## 2025-06-04 PROCEDURE — 93793 ANTICOAG MGMT PT WARFARIN: CPT | Performed by: NURSE PRACTITIONER

## 2025-06-04 PROCEDURE — 85610 PROTHROMBIN TIME: CPT | Performed by: NURSE PRACTITIONER

## 2025-06-04 ASSESSMENT — FIBROSIS 4 INDEX: FIB4 SCORE: 1.63

## 2025-06-04 NOTE — PROGRESS NOTES
"OP Anticoagulation Service Note    Date: 2025    Anticoagulation Summary  As of 2025      INR goal:  2.0-3.0   TTR:  82.0% (7.3 y)   INR used for dosin.80 (2025)   Warfarin maintenance plan:  2.5 mg (5 mg x 0.5) every Tue, Thu, Sat; 5 mg (5 mg x 1) all other days   Weekly warfarin total:  27.5 mg   Plan last modified:  Amy Dyer, PharmD (11/15/2023)   Next INR check:  2025   Target end date:  Indefinite    Indications    Stroke (CMS-HCC) [I63.9] (Resolved) [I63.9]  Chronic anticoagulation [Z79.01]                 Anticoagulation Episode Summary       INR check location:  Anticoagulation Clinic    Preferred lab:  --    Send INR reminders to:  --    Comments:  He alternates between 2.5mg and 5mg every other Thursday          Anticoagulation Care Providers       Provider Role Specialty Phone number    [facility] Responsible  [phone]    Marshal Sethi M.D. Responsible Neurology [phone]          Anticoagulation Patient Findings      HPI:   Hieu Duong is in the Anticoagulation Clinic today for an INR check on their anticoagulation therapy.     The reason for today's visit is to prevent morbidity and mortality from a blood clot and/or stroke and to reduce the risk of bleeding while on an anticoagulant.     PCP:  Doug Molina M.D.  [address] [id number] [phone]    3 vitals included with today's appt-unless patient declined:  (BP, HR, weight, ht, RR)   Vitals:    25 0853   Resp: 14   Weight: 59 kg (130 lb)   Height: 1.702 m (5' 7\")       Verified current warfarin dosing schedule.    Medications reconciled: No  Pt is not on antiplatelet therapy    Assessment:   INR is subtherapeutic    Plan:  Increase to 2.5 mg on Weds/Sat and 5 mg ROW (~9% increase in TWD).     Follow up:  Follow up appointment in 2 week(s). Pt agrees.      Other info:  Pt educated to contact our clinic with any changes in medications or s/s of bleeding " or thrombosis.  Education was provided today regarding tips to reduce their bleed risk and dietary constraints while on an anticoagulant.    National Recommendations:  The CHEST guidelines recommends frequent INR monitoring at regular intervals (a few days up to a max of 12 weeks) to ensure patients are on the proper dose of warfarin, and patients are not having any complications from therapy.  INRs can dramatically change over a short time period due to diet, medications, and medical conditions.     Bull Mcdonald, PharmD, [facility]  Phone: [phone], Fax: [phone]

## 2025-06-18 ENCOUNTER — ANTICOAGULATION VISIT (OUTPATIENT)
Dept: MEDICAL GROUP | Facility: PHYSICIAN GROUP | Age: 70
End: 2025-06-18
Payer: MEDICARE

## 2025-06-18 VITALS — BODY MASS INDEX: 20.36 KG/M2 | RESPIRATION RATE: 14 BRPM | HEART RATE: 74 BPM | OXYGEN SATURATION: 96 % | HEIGHT: 67 IN

## 2025-06-18 DIAGNOSIS — Z79.01 CHRONIC ANTICOAGULATION: ICD-10-CM

## 2025-06-18 DIAGNOSIS — Z79.01 CHRONIC ANTICOAGULATION: Primary | ICD-10-CM

## 2025-06-18 DIAGNOSIS — Z86.73 HISTORY OF CVA (CEREBROVASCULAR ACCIDENT): ICD-10-CM

## 2025-06-18 LAB — INR PPP: 1.8 (ref 2–3.5)

## 2025-06-18 PROCEDURE — 99211 OFF/OP EST MAY X REQ PHY/QHP: CPT | Performed by: NURSE PRACTITIONER

## 2025-06-18 PROCEDURE — 85610 PROTHROMBIN TIME: CPT | Performed by: NURSE PRACTITIONER

## 2025-06-18 RX ORDER — WARFARIN SODIUM 5 MG/1
TABLET ORAL
Qty: 90 TABLET | Refills: 2 | Status: SHIPPED | OUTPATIENT
Start: 2025-06-18

## 2025-06-18 NOTE — TELEPHONE ENCOUNTER
Received request via: Pharmacy    Was the patient seen in the last year in this department? Yes    Does the patient have an active prescription (recently filled or refills available) for medication(s) requested? No    Pharmacy Name: [facility]/pharmacy #[id number] - [place], [place] - [address]     Does the patient have alf Plus and need 100-day supply? (This applies to ALL medications) Patient does not have SCP

## 2025-06-18 NOTE — PROGRESS NOTES
Anticoagulation Summary  As of 2025      INR goal:  2.0-3.0   TTR:  81.6% (7.4 y)   INR used for dosin.80 (2025)   Warfarin maintenance plan:  2.5 mg (5 mg x 0.5) every Tue, Sat; 5 mg (5 mg x 1) all other days   Weekly warfarin total:  30 mg   Plan last modified:  Dalton Taveras, PharmD (2025)   Next INR check:  2025   Target end date:  Indefinite    Indications    Stroke (CMS-HCC) [I63.9] (Resolved) [I63.9]  Chronic anticoagulation [Z79.01]                 Anticoagulation Episode Summary       INR check location:  Anticoagulation Clinic    Preferred lab:  --    Send INR reminders to:  --    Comments:  He alternates between 2.5mg and 5mg every other Thursday          Anticoagulation Care Providers       Provider Role Specialty Phone number    [facility] Responsible  [phone]    Marshal Sethi M.D. Responsible Neurology [phone]          Anticoagulation Patient Findings  Patient Findings       Negatives:  Signs/symptoms of thrombosis, Signs/symptoms of bleeding, Laboratory test error suspected, Change in health, Change in alcohol use, Change in activity, Upcoming invasive procedure, Emergency department visit, Upcoming dental procedure, Missed doses, Extra doses, Change in medications, Change in diet/appetite, Hospital admission, Bruising, Other complaints                  HPI:   Hieu Duong seen in clinic today, on anticoagulation therapy with warfarin due to history of stroke.    Patient's previous INR was subtherapeutic at 1.8 on 25, at which time patient was instructed to increase weekly warfarin regimen.  He returns to clinic today to recheck INR to ensure it is therapeutic and thus preventing possible clotting and/or bleeding/bruising complications.    CHADS-VASc = n/a  (unadjusted ischemic stroke risk/year:  n/a)    Does patient have any changes to current medical/health status since last appt (Y/N):  NO  Does patient have any signs/symptoms of  "bleeding and/or thrombosis since the last appt (Y/N):  NO  Does patient have any interval changes to diet or medications since last appt (Y/N):  Missed dose last week, took extra the following day.  Are there any complications or cost restrictions with current therapy (Y/N):  NO     Does patient have [facility] PCP? Yes, Doug Molina M.D. (If not, please document discussion that patient must be seen at [facility])       Vitals:      Vitals:    06/18/25 0946   Pulse: 74   Resp: 14   SpO2: 96%   Height: 1.702 m (5' 7.01\")        Asssessment:      INR is subtherapeutic  Reason(s) for out of range INR today: Determining dose requirements      Pt is not on antiplatelet therapy    Medication reconciliation completed today.    Plan:  Pt is to bolus with 7.5mg X 1, then continue with current warfarin dosing regimen.     Follow up:  Because warfarin is a high risk medication and current CHEST guidelines recommend regular monitoring intervals (few days up to 12 weeks), will have patient return to clinic in 2 weeks to recheck INR.    Dalton Taveras, PharmD, BCACP      "

## 2025-06-23 DIAGNOSIS — E10.9 TYPE 1 DIABETES MELLITUS WITHOUT COMPLICATION (HCC): ICD-10-CM

## 2025-06-23 RX ORDER — SILDENAFIL 100 MG/1
100 TABLET, FILM COATED ORAL
Qty: 10 TABLET | Refills: 3 | Status: SHIPPED | OUTPATIENT
Start: 2025-06-23

## 2025-06-23 NOTE — TELEPHONE ENCOUNTER
Received request via: Pharmacy    Was the patient seen in the last year in this department? Yes    Does the patient have an active prescription (recently filled or refills available) for medication(s) requested? No    [facility]    Does the patient have nursing home Plus and need 100-day supply? (This applies to ALL medications) Yes, quantity updated to 100 days

## 2025-07-02 ENCOUNTER — APPOINTMENT (OUTPATIENT)
Dept: MEDICAL GROUP | Facility: PHYSICIAN GROUP | Age: 70
End: 2025-07-02
Payer: MEDICARE

## 2025-07-02 VITALS — WEIGHT: 130 LBS | BODY MASS INDEX: 20.4 KG/M2 | HEIGHT: 67 IN | RESPIRATION RATE: 14 BRPM

## 2025-07-02 DIAGNOSIS — Z79.01 CHRONIC ANTICOAGULATION: Primary | ICD-10-CM

## 2025-07-02 LAB — INR PPP: 2.1 (ref 2–3.5)

## 2025-07-02 PROCEDURE — 93793 ANTICOAG MGMT PT WARFARIN: CPT | Performed by: PHYSICIAN ASSISTANT

## 2025-07-02 PROCEDURE — 85610 PROTHROMBIN TIME: CPT | Performed by: PHYSICIAN ASSISTANT

## 2025-07-02 ASSESSMENT — FIBROSIS 4 INDEX: FIB4 SCORE: 1.42

## 2025-07-02 NOTE — PROGRESS NOTES
"OP Anticoagulation Service Note    Date: 2025    Anticoagulation Summary  As of 2025      INR goal:  2.0-3.0   TTR:  81.3% (7.4 y)   INR used for dosin.10 (2025)   Warfarin maintenance plan:  2.5 mg (5 mg x 0.5) every Tue, Sat; 5 mg (5 mg x 1) all other days   Weekly warfarin total:  30 mg   Plan last modified:  Dalton Taveras, PharmD (2025)   Next INR check:  2025   Target end date:  Indefinite    Indications    Stroke (CMS-HCC) [I63.9] (Resolved) [I63.9]  Chronic anticoagulation [Z79.01]                 Anticoagulation Episode Summary       INR check location:  Anticoagulation Clinic    Preferred lab:  --    Send INR reminders to:  --    Comments:  He alternates between 2.5mg and 5mg every other Thursday          Anticoagulation Care Providers       Provider Role Specialty Phone number    [facility] Responsible  [phone]    Marshal Sethi M.D. Responsible Neurology [phone]          Anticoagulation Patient Findings      HPI:   Hieu Duong is in the Anticoagulation Clinic today for an INR check on their anticoagulation therapy.     The reason for today's visit is to prevent morbidity and mortality from a blood clot and/or stroke and to reduce the risk of bleeding while on an anticoagulant.     PCP:  Doug Molina M.D.  [address] [id number] [phone]    3 vitals included with today's appt-unless patient declined:  (BP, HR, weight, ht, RR)   Vitals:    25 0827   Resp: 14   Weight: 59 kg (130 lb)   Height: 1.702 m (5' 7\")       Verified current warfarin dosing schedule.    Medications reconciled: No  Pt is not on antiplatelet therapy    Assessment:   INR is therapeutic    Plan:  Continue regimen as listed above.    Follow up:  Follow up appointment in 3 week(s). Pt declines despite warning of extending their follow up interval. Pt opts to RTC in 5 week(s) due to traveling/vacations during the summer.      Other " info:  Pt educated to contact our clinic with any changes in medications or s/s of bleeding or thrombosis.  Education was provided today regarding tips to reduce their bleed risk and dietary constraints while on an anticoagulant.    National Recommendations:  The CHEST guidelines recommends frequent INR monitoring at regular intervals (a few days up to a max of 12 weeks) to ensure patients are on the proper dose of warfarin, and patients are not having any complications from therapy.  INRs can dramatically change over a short time period due to diet, medications, and medical conditions.     Bull Mcdonald, PharmD, [facility]  Phone: [phone], Fax: [phone]

## 2025-07-18 ENCOUNTER — TELEPHONE (OUTPATIENT)
Dept: HEALTH INFORMATION MANAGEMENT | Facility: OTHER | Age: 70
End: 2025-07-18
Payer: MEDICARE

## 2025-08-06 ENCOUNTER — ANTICOAGULATION VISIT (OUTPATIENT)
Dept: MEDICAL GROUP | Facility: PHYSICIAN GROUP | Age: 70
End: 2025-08-06
Payer: MEDICARE

## 2025-08-06 VITALS — RESPIRATION RATE: 15 BRPM | BODY MASS INDEX: 20.36 KG/M2 | OXYGEN SATURATION: 96 % | HEART RATE: 79 BPM | HEIGHT: 67 IN

## 2025-08-06 DIAGNOSIS — Z79.01 CHRONIC ANTICOAGULATION: Primary | ICD-10-CM

## 2025-08-06 LAB — INR PPP: 2.4 (ref 2–3.5)

## 2025-08-06 PROCEDURE — 85610 PROTHROMBIN TIME: CPT | Performed by: STUDENT IN AN ORGANIZED HEALTH CARE EDUCATION/TRAINING PROGRAM

## 2025-08-06 PROCEDURE — 93793 ANTICOAG MGMT PT WARFARIN: CPT | Performed by: STUDENT IN AN ORGANIZED HEALTH CARE EDUCATION/TRAINING PROGRAM

## 2025-08-08 ENCOUNTER — TELEPHONE (OUTPATIENT)
Dept: FAMILY PLANNING/WOMEN'S HEALTH CLINIC | Facility: PHYSICIAN GROUP | Age: 70
End: 2025-08-08
Payer: MEDICARE

## 2025-08-14 ENCOUNTER — APPOINTMENT (OUTPATIENT)
Dept: MEDICAL GROUP | Facility: MEDICAL CENTER | Age: 70
End: 2025-08-14
Payer: MEDICARE

## 2025-08-14 DIAGNOSIS — E10.9 TYPE 1 DIABETES MELLITUS WITHOUT COMPLICATION (HCC): ICD-10-CM

## 2025-08-14 DIAGNOSIS — M81.8 OTHER OSTEOPOROSIS WITHOUT CURRENT PATHOLOGICAL FRACTURE: ICD-10-CM

## 2025-08-14 DIAGNOSIS — I10 ESSENTIAL HYPERTENSION: ICD-10-CM

## 2025-08-14 DIAGNOSIS — F32.A DEPRESSION, UNSPECIFIED DEPRESSION TYPE: Primary | ICD-10-CM

## 2025-08-14 DIAGNOSIS — Z79.4 LONG TERM (CURRENT) USE OF INSULIN (HCC): ICD-10-CM

## 2025-08-14 DIAGNOSIS — E78.2 MIXED HYPERLIPIDEMIA: ICD-10-CM
